# Patient Record
Sex: FEMALE | Race: WHITE | NOT HISPANIC OR LATINO | Employment: FULL TIME | URBAN - NONMETROPOLITAN AREA
[De-identification: names, ages, dates, MRNs, and addresses within clinical notes are randomized per-mention and may not be internally consistent; named-entity substitution may affect disease eponyms.]

---

## 2017-02-24 ENCOUNTER — TRANSCRIBE ORDERS (OUTPATIENT)
Dept: MAMMOGRAPHY | Facility: HOSPITAL | Age: 62
End: 2017-02-24

## 2017-02-24 DIAGNOSIS — Z13.9 SCREENING: Primary | ICD-10-CM

## 2017-03-13 ENCOUNTER — HOSPITAL ENCOUNTER (OUTPATIENT)
Dept: MAMMOGRAPHY | Facility: HOSPITAL | Age: 62
Discharge: HOME | End: 2017-03-13
Admitting: NURSE PRACTITIONER

## 2017-03-13 DIAGNOSIS — Z13.9 SCREENING: ICD-10-CM

## 2017-03-13 PROCEDURE — 77063 BREAST TOMOSYNTHESIS BI: CPT

## 2017-03-13 PROCEDURE — G0202 SCR MAMMO BI INCL CAD: HCPCS

## 2017-12-08 ENCOUNTER — APPOINTMENT (OUTPATIENT)
Dept: PREADMISSION TESTING | Facility: HOSPITAL | Age: 62
End: 2017-12-08

## 2017-12-08 VITALS — HEIGHT: 64 IN | WEIGHT: 172 LBS | BODY MASS INDEX: 29.37 KG/M2

## 2017-12-08 RX ORDER — ASPIRIN 81 MG/1
81 TABLET ORAL DAILY
COMMUNITY

## 2017-12-08 RX ORDER — LISINOPRIL 40 MG/1
40 TABLET ORAL DAILY
Status: ON HOLD | COMMUNITY
End: 2021-10-10 | Stop reason: SDUPTHER

## 2017-12-08 RX ORDER — VERAPAMIL HYDROCHLORIDE 240 MG/1
240 TABLET, FILM COATED, EXTENDED RELEASE ORAL DAILY
Status: ON HOLD | COMMUNITY
End: 2021-10-07

## 2017-12-08 RX ORDER — METOPROLOL TARTRATE 100 MG/1
100 TABLET ORAL DAILY
Status: ON HOLD | COMMUNITY
End: 2021-10-07

## 2017-12-08 RX ORDER — RANITIDINE 150 MG/1
150 TABLET ORAL DAILY
Status: ON HOLD | COMMUNITY
End: 2021-10-07

## 2017-12-08 RX ORDER — LEVOTHYROXINE SODIUM 0.05 MG/1
50 TABLET ORAL DAILY
COMMUNITY
End: 2022-02-11 | Stop reason: SDUPTHER

## 2017-12-19 ENCOUNTER — ANESTHESIA EVENT (OUTPATIENT)
Dept: PERIOP | Facility: HOSPITAL | Age: 62
End: 2017-12-19

## 2017-12-19 ENCOUNTER — ANESTHESIA (OUTPATIENT)
Dept: PERIOP | Facility: HOSPITAL | Age: 62
End: 2017-12-19

## 2017-12-19 ENCOUNTER — HOSPITAL ENCOUNTER (OUTPATIENT)
Facility: HOSPITAL | Age: 62
Setting detail: HOSPITAL OUTPATIENT SURGERY
Discharge: HOME | End: 2017-12-19
Attending: OPHTHALMOLOGY | Admitting: OPHTHALMOLOGY

## 2017-12-19 VITALS
SYSTOLIC BLOOD PRESSURE: 105 MMHG | DIASTOLIC BLOOD PRESSURE: 62 MMHG | OXYGEN SATURATION: 98 % | TEMPERATURE: 97 F | HEART RATE: 53 BPM | RESPIRATION RATE: 18 BRPM

## 2017-12-19 PROBLEM — H26.9 CATARACT OF RIGHT EYE: Status: RESOLVED | Noted: 2017-12-19 | Resolved: 2017-12-19

## 2017-12-19 PROBLEM — H26.9 CATARACT OF RIGHT EYE: Status: ACTIVE | Noted: 2017-12-19

## 2017-12-19 LAB — GLUCOSE BLDC GLUCOMTR-MCNC: 130 MG/DL (ref 70–130)

## 2017-12-19 PROCEDURE — 82962 GLUCOSE BLOOD TEST: CPT

## 2017-12-19 PROCEDURE — 25010000002 EPINEPHRINE PER 0.1 MG: Performed by: OPHTHALMOLOGY

## 2017-12-19 PROCEDURE — V2632 POST CHMBR INTRAOCULAR LENS: HCPCS | Performed by: OPHTHALMOLOGY

## 2017-12-19 PROCEDURE — 25010000002 MIDAZOLAM PER 1 MG: Performed by: NURSE ANESTHETIST, CERTIFIED REGISTERED

## 2017-12-19 DEVICE — LENS ACRYSOF IQ 6X13MM SN60WF 21.5: Type: IMPLANTABLE DEVICE | Site: POSTERIOR CHAMBER | Status: FUNCTIONAL

## 2017-12-19 RX ORDER — CYCLOPENTOLATE HYDROCHLORIDE 20 MG/ML
1 SOLUTION/ DROPS OPHTHALMIC
Status: COMPLETED | OUTPATIENT
Start: 2017-12-19 | End: 2017-12-19

## 2017-12-19 RX ORDER — SODIUM CHLORIDE, SODIUM LACTATE, POTASSIUM CHLORIDE, CALCIUM CHLORIDE 600; 310; 30; 20 MG/100ML; MG/100ML; MG/100ML; MG/100ML
1000 INJECTION, SOLUTION INTRAVENOUS CONTINUOUS PRN
Status: DISCONTINUED | OUTPATIENT
Start: 2017-12-19 | End: 2017-12-19 | Stop reason: HOSPADM

## 2017-12-19 RX ORDER — ENALAPRILAT 2.5 MG/2ML
1.25 INJECTION INTRAVENOUS ONCE AS NEEDED
Status: DISCONTINUED | OUTPATIENT
Start: 2017-12-19 | End: 2017-12-19 | Stop reason: HOSPADM

## 2017-12-19 RX ORDER — PREDNISOLONE ACETATE 10 MG/ML
SUSPENSION/ DROPS OPHTHALMIC AS NEEDED
Status: DISCONTINUED | OUTPATIENT
Start: 2017-12-19 | End: 2017-12-19 | Stop reason: HOSPADM

## 2017-12-19 RX ORDER — MIDAZOLAM HYDROCHLORIDE 1 MG/ML
INJECTION INTRAMUSCULAR; INTRAVENOUS AS NEEDED
Status: DISCONTINUED | OUTPATIENT
Start: 2017-12-19 | End: 2017-12-19 | Stop reason: SURG

## 2017-12-19 RX ORDER — PREDNISOLONE ACETATE 10 MG/ML
SUSPENSION/ DROPS OPHTHALMIC
Status: DISCONTINUED
Start: 2017-12-19 | End: 2017-12-19 | Stop reason: HOSPADM

## 2017-12-19 RX ORDER — CYCLOPENTOLATE HYDROCHLORIDE 20 MG/ML
SOLUTION/ DROPS OPHTHALMIC
Status: COMPLETED
Start: 2017-12-19 | End: 2017-12-19

## 2017-12-19 RX ORDER — MOXIFLOXACIN 5 MG/ML
1 SOLUTION/ DROPS OPHTHALMIC 3 TIMES DAILY
Status: ON HOLD | COMMUNITY
End: 2021-10-07

## 2017-12-19 RX ORDER — PHENYLEPHRINE HYDROCHLORIDE 100 MG/ML
1 SOLUTION/ DROPS OPHTHALMIC
Status: COMPLETED | OUTPATIENT
Start: 2017-12-19 | End: 2017-12-19

## 2017-12-19 RX ORDER — LIDOCAINE HYDROCHLORIDE 40 MG/ML
INJECTION, SOLUTION RETROBULBAR; TOPICAL AS NEEDED
Status: DISCONTINUED | OUTPATIENT
Start: 2017-12-19 | End: 2017-12-19 | Stop reason: HOSPADM

## 2017-12-19 RX ORDER — PHENYLEPHRINE HYDROCHLORIDE 100 MG/ML
SOLUTION/ DROPS OPHTHALMIC
Status: COMPLETED
Start: 2017-12-19 | End: 2017-12-19

## 2017-12-19 RX ORDER — TETRACAINE HYDROCHLORIDE 5 MG/ML
SOLUTION OPHTHALMIC
Status: COMPLETED
Start: 2017-12-19 | End: 2017-12-19

## 2017-12-19 RX ORDER — SODIUM CHLORIDE 0.9 % (FLUSH) 0.9 %
1-10 SYRINGE (ML) INJECTION AS NEEDED
Status: DISCONTINUED | OUTPATIENT
Start: 2017-12-19 | End: 2017-12-19 | Stop reason: HOSPADM

## 2017-12-19 RX ORDER — BALANCED SALT SOLUTION 6.4; .75; .48; .3; 3.9; 1.7 MG/ML; MG/ML; MG/ML; MG/ML; MG/ML; MG/ML
SOLUTION OPHTHALMIC AS NEEDED
Status: DISCONTINUED | OUTPATIENT
Start: 2017-12-19 | End: 2017-12-19 | Stop reason: HOSPADM

## 2017-12-19 RX ORDER — PREDNISOLONE ACETATE 10 MG/ML
1 SUSPENSION/ DROPS OPHTHALMIC
Status: DISCONTINUED | OUTPATIENT
Start: 2017-12-19 | End: 2017-12-19 | Stop reason: HOSPADM

## 2017-12-19 RX ORDER — PILOCARPINE HYDROCHLORIDE 10 MG/ML
SOLUTION/ DROPS OPHTHALMIC AS NEEDED
Status: DISCONTINUED | OUTPATIENT
Start: 2017-12-19 | End: 2017-12-19 | Stop reason: HOSPADM

## 2017-12-19 RX ORDER — TETRACAINE HYDROCHLORIDE 5 MG/ML
1 SOLUTION OPHTHALMIC SEE ADMIN INSTRUCTIONS
Status: COMPLETED | OUTPATIENT
Start: 2017-12-19 | End: 2017-12-19

## 2017-12-19 RX ADMIN — MIDAZOLAM HYDROCHLORIDE 1 MG: 1 INJECTION, SOLUTION INTRAMUSCULAR; INTRAVENOUS at 09:30

## 2017-12-19 RX ADMIN — MIDAZOLAM HYDROCHLORIDE 1 MG: 1 INJECTION, SOLUTION INTRAMUSCULAR; INTRAVENOUS at 09:40

## 2017-12-19 RX ADMIN — CYCLOPENTOLATE HYDROCHLORIDE 1 DROP: 20 SOLUTION/ DROPS OPHTHALMIC at 08:40

## 2017-12-19 RX ADMIN — SODIUM CHLORIDE, POTASSIUM CHLORIDE, SODIUM LACTATE AND CALCIUM CHLORIDE 1000 ML: 600; 310; 30; 20 INJECTION, SOLUTION INTRAVENOUS at 08:44

## 2017-12-19 RX ADMIN — CYCLOPENTOLATE HYDROCHLORIDE 1 DROP: 20 SOLUTION/ DROPS OPHTHALMIC at 08:35

## 2017-12-19 RX ADMIN — PHENYLEPHRINE HYDROCHLORIDE 1 DROP: 100 SOLUTION/ DROPS OPHTHALMIC at 08:40

## 2017-12-19 RX ADMIN — TETRACAINE HYDROCHLORIDE 1 DROP: 5 SOLUTION OPHTHALMIC at 08:34

## 2017-12-19 RX ADMIN — PHENYLEPHRINE HYDROCHLORIDE 1 DROP: 100 SOLUTION/ DROPS OPHTHALMIC at 08:41

## 2017-12-19 RX ADMIN — CYCLOPENTOLATE HYDROCHLORIDE 1 DROP: 20 SOLUTION/ DROPS OPHTHALMIC at 08:45

## 2017-12-19 RX ADMIN — PHENYLEPHRINE HYDROCHLORIDE 1 DROP: 100 SOLUTION/ DROPS OPHTHALMIC at 08:35

## 2017-12-19 RX ADMIN — TETRACAINE HYDROCHLORIDE 1 DROP: 5 SOLUTION OPHTHALMIC at 08:33

## 2017-12-19 NOTE — ANESTHESIA POSTPROCEDURE EVALUATION
Patient: Lizeth Bhardwaj    Procedure Summary     Date Anesthesia Start Anesthesia Stop Room / Location    12/19/17 0929 0956  [facility]       Procedure Diagnosis Surgeon Provider    CATARACT PHACO EXTRACTION WITH INTRAOCULAR LENS IMPLANT RIGHT (Right Eye) No diagnosis on file. MD Darryl Billy CRNA          Anesthesia Type: MAC  Last vitals  BP   108/67 (12/19/17 1002)   Temp   97 °F (36.1 °C) (12/19/17 1002)   Pulse   57 (12/19/17 1002)   Resp   18 (12/19/17 1002)     SpO2   97 % (12/19/17 1002)     Post Anesthesia Care and Evaluation    Patient location during evaluation: bedside  Patient participation: complete - patient participated  Level of consciousness: awake and alert  Pain management: satisfactory to patient  Airway patency: patent  Anesthetic complications: No anesthetic complications  PONV Status: controlled  Cardiovascular status: acceptable and stable  Respiratory status: acceptable  Hydration status: acceptable

## 2017-12-19 NOTE — PLAN OF CARE
Problem: Cataract (Adult)  Goal: Signs and Symptoms of Listed Potential Problems Will be Absent or Manageable (Cataract)  Outcome: Ongoing (interventions implemented as appropriate)    12/19/17 4133   Cataract   Problems Assessed (Cataract) all   Problems Present (Cataract) none

## 2017-12-19 NOTE — OP NOTE
OPERATIVE NOTE    DATE OF PROCEDURE: 12/19/2017  PATIENT NAME: Lizeth Bhardwaj  PATIENT MRN: [id number]  YOB: 1955     PREOPERATIVE DIAGNOSIS: Right nuclear sclerotic cataract.     POSTOPERATIVE DIAGNOSIS: Right nuclear sclerotic cataract.     PROCEDURE PERFORMED: Phacoemulsification with implantation of a 21.5 diopter foldable posterior chamber intraocular lens, Right eye.     SURGEON: Kriss Way MD      INDICATIONS: The patient is an 62 y.o. female with a Right nuclear sclerotic cataract with a preoperative visual acuity of hand motion. The patient desires better vision and is brought to the operating room at this time for elective cataract extraction with planned IOL.      DESCRIPTION OF PROCEDURE: The patient was brought to the operating room in the fasting state. Once all the vital signs were established to be within normal limits and supplemental oxygen was given, the area of the operative eye was prepped and draped in the usual fashion. A wire lid speculum was inserted into the cul-de-sac. Additional topical anesthetic drops were instilled. A fornix-based conjunctival flap was performed from the 11-o'clock to 1-o'clock position. A stab incision was made in the peripheral cornea with the 0.8mm stab blade. Next 0.3 mL of 1% unpreserved Xylocaine was injected in the anterior chamber. The anterior chamber was then entered with a 2.4 mm keratome blade and then under viscoelastic a capsulotomy was performed using the disposable cystotome in a continuous curvilinear fashion. The anterior capsule was then removed and the phacoemulsification handpiece was then introduced into the anterior segment and the nucleus was emulsified without difficulty. The CDE was 25.00. Once this was accomplished, the irrigation and aspiration handpiece was then used to aspirate the residual cortex. The posterior capsule was cleaned of any residual material and then polished with the irrigation and aspiration  handpiece and the viscoelastic was used to inflate the capsular bag. The Te AcrySof implant was then loaded into the microcartridge. It measured 21.5 diopters. The injector was then introduced into the capsular bag and the implant was slowly implanted without difficulty. The lens was then rotated to the appropriate axis and the viscoelastic was aspirated. The conjunctiva was then repositioned. Topical Betoptic-S, pilocarpine, and Pred-Forte drops were applied. Polysporin ointment was then instilled. There were no anesthetic or surgical difficulties. The patient tolerated the procedure very well and was returned to same day surgery in satisfactory condition.       Implant Name Type Inv. Item Serial No.  Lot No. LRB No. Used   LENS ACRYSOF IQ [id number] 21.5 - [id number] - [id number] Implant LENS ACRYSOF IQ [id number] 21.5 [id number]   Right 1           Kriss Way MD  12/19/2017

## 2017-12-19 NOTE — ANESTHESIA PREPROCEDURE EVALUATION
Anesthesia Evaluation     Patient summary reviewed and Nursing notes reviewed   NPO Solid Status: > 8 hours  NPO Liquid Status: > 8 hours     Airway   Mallampati: II  TM distance: >3 FB  Neck ROM: limited  possible difficult intubation  Dental    (+) poor dentation    Pulmonary - normal exam    breath sounds clear to auscultation  Cardiovascular - normal exam    Patient on routine beta blocker  Rhythm: regular  Rate: normal    (+) hypertension,       Neuro/Psych  GI/Hepatic/Renal/Endo    (+)  hiatal hernia, GERD, diabetes mellitus,     Musculoskeletal     Abdominal    Substance History      OB/GYN          Other                                        Anesthesia Plan    ASA 3     MAC     intravenous induction   Anesthetic plan and risks discussed with patient.

## 2018-02-05 ENCOUNTER — TRANSCRIBE ORDERS (OUTPATIENT)
Dept: ADMINISTRATIVE | Facility: HOSPITAL | Age: 63
End: 2018-02-05

## 2018-02-05 DIAGNOSIS — Z12.39 ENCOUNTER FOR SCREENING FOR MALIGNANT NEOPLASM OF BREAST: Primary | ICD-10-CM

## 2018-03-19 ENCOUNTER — HOSPITAL ENCOUNTER (OUTPATIENT)
Dept: MAMMOGRAPHY | Facility: HOSPITAL | Age: 63
Discharge: HOME | End: 2018-03-19
Admitting: NURSE PRACTITIONER

## 2018-03-19 ENCOUNTER — APPOINTMENT (OUTPATIENT)
Dept: MAMMOGRAPHY | Facility: HOSPITAL | Age: 63
End: 2018-03-19

## 2018-03-19 DIAGNOSIS — Z12.39 ENCOUNTER FOR SCREENING FOR MALIGNANT NEOPLASM OF BREAST: ICD-10-CM

## 2018-03-19 PROCEDURE — 77063 BREAST TOMOSYNTHESIS BI: CPT

## 2018-03-19 PROCEDURE — 77067 SCR MAMMO BI INCL CAD: CPT

## 2019-02-18 ENCOUNTER — TRANSCRIBE ORDERS (OUTPATIENT)
Dept: MAMMOGRAPHY | Facility: HOSPITAL | Age: 64
End: 2019-02-18

## 2019-02-18 ENCOUNTER — TRANSCRIBE ORDERS (OUTPATIENT)
Dept: ADMINISTRATIVE | Facility: HOSPITAL | Age: 64
End: 2019-02-18

## 2019-02-18 DIAGNOSIS — N18.30 CHRONIC KIDNEY DISEASE, STAGE III (MODERATE) (HCC): Primary | ICD-10-CM

## 2019-02-18 DIAGNOSIS — Z12.39 BREAST CANCER SCREENING: Primary | ICD-10-CM

## 2019-02-25 ENCOUNTER — HOSPITAL ENCOUNTER (OUTPATIENT)
Dept: ULTRASOUND IMAGING | Facility: HOSPITAL | Age: 64
Discharge: HOME | End: 2019-02-25
Admitting: INTERNAL MEDICINE

## 2019-02-25 DIAGNOSIS — N18.30 CHRONIC KIDNEY DISEASE, STAGE III (MODERATE) (HCC): ICD-10-CM

## 2019-02-25 PROCEDURE — 76775 US EXAM ABDO BACK WALL LIM: CPT

## 2019-04-01 ENCOUNTER — HOSPITAL ENCOUNTER (OUTPATIENT)
Dept: MAMMOGRAPHY | Facility: HOSPITAL | Age: 64
Discharge: HOME | End: 2019-04-01
Admitting: NURSE PRACTITIONER

## 2019-04-01 DIAGNOSIS — Z12.39 BREAST CANCER SCREENING: ICD-10-CM

## 2019-04-01 PROCEDURE — 77067 SCR MAMMO BI INCL CAD: CPT

## 2019-04-01 PROCEDURE — 77063 BREAST TOMOSYNTHESIS BI: CPT

## 2020-03-04 ENCOUNTER — TRANSCRIBE ORDERS (OUTPATIENT)
Dept: MAMMOGRAPHY | Facility: HOSPITAL | Age: 65
End: 2020-03-04

## 2020-03-04 DIAGNOSIS — Z12.31 ENCOUNTER FOR SCREENING MAMMOGRAM FOR MALIGNANT NEOPLASM OF BREAST: Primary | ICD-10-CM

## 2020-03-06 ENCOUNTER — TRANSCRIBE ORDERS (OUTPATIENT)
Dept: ADMINISTRATIVE | Facility: HOSPITAL | Age: 65
End: 2020-03-06

## 2020-03-06 DIAGNOSIS — I77.9 DISEASE OF ARTERY (HCC): Primary | ICD-10-CM

## 2020-03-11 ENCOUNTER — HOSPITAL ENCOUNTER (OUTPATIENT)
Dept: ULTRASOUND IMAGING | Facility: HOSPITAL | Age: 65
Discharge: HOME | End: 2020-03-11
Admitting: INTERNAL MEDICINE

## 2020-03-11 DIAGNOSIS — I77.9 DISEASE OF ARTERY (HCC): ICD-10-CM

## 2020-03-11 PROCEDURE — 93880 EXTRACRANIAL BILAT STUDY: CPT

## 2021-05-12 ENCOUNTER — TRANSCRIBE ORDERS (OUTPATIENT)
Dept: ADMINISTRATIVE | Facility: HOSPITAL | Age: 66
End: 2021-05-12

## 2021-05-12 DIAGNOSIS — Z12.31 VISIT FOR SCREENING MAMMOGRAM: Primary | ICD-10-CM

## 2021-06-24 ENCOUNTER — APPOINTMENT (OUTPATIENT)
Dept: MAMMOGRAPHY | Facility: HOSPITAL | Age: 66
End: 2021-06-24

## 2021-10-06 ENCOUNTER — APPOINTMENT (OUTPATIENT)
Dept: GENERAL RADIOLOGY | Facility: HOSPITAL | Age: 66
End: 2021-10-06

## 2021-10-06 ENCOUNTER — HOSPITAL ENCOUNTER (INPATIENT)
Facility: HOSPITAL | Age: 66
LOS: 4 days | Discharge: HOME | End: 2021-10-10
Attending: EMERGENCY MEDICINE | Admitting: INTERNAL MEDICINE

## 2021-10-06 DIAGNOSIS — N17.9 ACUTE RENAL FAILURE, UNSPECIFIED ACUTE RENAL FAILURE TYPE (HCC): Primary | ICD-10-CM

## 2021-10-06 DIAGNOSIS — E87.5 HYPERKALEMIA: ICD-10-CM

## 2021-10-06 LAB
ALBUMIN SERPL-MCNC: 4.5 G/DL (ref 3.5–5.2)
ALBUMIN/GLOB SERPL: 1.4 G/DL
ALP SERPL-CCNC: 67 U/L (ref 39–117)
ALT SERPL W P-5'-P-CCNC: 15 U/L (ref 1–33)
ANION GAP SERPL CALCULATED.3IONS-SCNC: 10.3 MMOL/L (ref 5–15)
ANION GAP SERPL CALCULATED.3IONS-SCNC: 10.6 MMOL/L (ref 5–15)
AST SERPL-CCNC: 18 U/L (ref 1–32)
BACTERIA UR QL AUTO: ABNORMAL /HPF
BASOPHILS # BLD AUTO: 0.04 10*3/MM3 (ref 0–0.2)
BASOPHILS NFR BLD AUTO: 0.3 % (ref 0–1.5)
BILIRUB SERPL-MCNC: 0.2 MG/DL (ref 0–1.2)
BILIRUB UR QL STRIP: NEGATIVE
BUN SERPL-MCNC: 94 MG/DL (ref 8–23)
BUN SERPL-MCNC: 98 MG/DL (ref 8–23)
BUN/CREAT SERPL: 17.4 (ref 7–25)
BUN/CREAT SERPL: 18.3 (ref 7–25)
CALCIUM SPEC-SCNC: 9.8 MG/DL (ref 8.6–10.5)
CALCIUM SPEC-SCNC: 9.8 MG/DL (ref 8.6–10.5)
CHLORIDE SERPL-SCNC: 109 MMOL/L (ref 98–107)
CHLORIDE SERPL-SCNC: 114 MMOL/L (ref 98–107)
CLARITY UR: CLEAR
CO2 SERPL-SCNC: 10.7 MMOL/L (ref 22–29)
CO2 SERPL-SCNC: 11.4 MMOL/L (ref 22–29)
COLOR UR: YELLOW
CREAT SERPL-MCNC: 5.13 MG/DL (ref 0.57–1)
CREAT SERPL-MCNC: 5.62 MG/DL (ref 0.57–1)
DEPRECATED RDW RBC AUTO: 45.3 FL (ref 37–54)
EOSINOPHIL # BLD AUTO: 0.11 10*3/MM3 (ref 0–0.4)
EOSINOPHIL NFR BLD AUTO: 0.8 % (ref 0.3–6.2)
ERYTHROCYTE [DISTWIDTH] IN BLOOD BY AUTOMATED COUNT: 13.3 % (ref 12.3–15.4)
GFR SERPL CREATININE-BSD FRML MDRD: 8 ML/MIN/1.73
GFR SERPL CREATININE-BSD FRML MDRD: 8 ML/MIN/1.73
GFR SERPL CREATININE-BSD FRML MDRD: ABNORMAL ML/MIN/{1.73_M2}
GFR SERPL CREATININE-BSD FRML MDRD: ABNORMAL ML/MIN/{1.73_M2}
GLOBULIN UR ELPH-MCNC: 3.3 GM/DL
GLUCOSE SERPL-MCNC: 106 MG/DL (ref 65–99)
GLUCOSE SERPL-MCNC: 188 MG/DL (ref 65–99)
GLUCOSE UR STRIP-MCNC: NEGATIVE MG/DL
HCT VFR BLD AUTO: 40.9 % (ref 34–46.6)
HGB BLD-MCNC: 13.7 G/DL (ref 12–15.9)
HGB UR QL STRIP.AUTO: NEGATIVE
HYALINE CASTS UR QL AUTO: ABNORMAL /LPF
IMM GRANULOCYTES # BLD AUTO: 0.05 10*3/MM3 (ref 0–0.05)
IMM GRANULOCYTES NFR BLD AUTO: 0.4 % (ref 0–0.5)
KETONES UR QL STRIP: NEGATIVE
LEUKOCYTE ESTERASE UR QL STRIP.AUTO: ABNORMAL
LYMPHOCYTES # BLD AUTO: 1.69 10*3/MM3 (ref 0.7–3.1)
LYMPHOCYTES NFR BLD AUTO: 12.3 % (ref 19.6–45.3)
MAGNESIUM SERPL-MCNC: 2.3 MG/DL (ref 1.6–2.4)
MCH RBC QN AUTO: 31.1 PG (ref 26.6–33)
MCHC RBC AUTO-ENTMCNC: 33.5 G/DL (ref 31.5–35.7)
MCV RBC AUTO: 92.7 FL (ref 79–97)
MONOCYTES # BLD AUTO: 0.59 10*3/MM3 (ref 0.1–0.9)
MONOCYTES NFR BLD AUTO: 4.3 % (ref 5–12)
NEUTROPHILS NFR BLD AUTO: 11.28 10*3/MM3 (ref 1.7–7)
NEUTROPHILS NFR BLD AUTO: 81.9 % (ref 42.7–76)
NITRITE UR QL STRIP: NEGATIVE
NRBC BLD AUTO-RTO: 0 /100 WBC (ref 0–0.2)
PH UR STRIP.AUTO: <=5 [PH] (ref 5–8)
PLATELET # BLD AUTO: 247 10*3/MM3 (ref 140–450)
PMV BLD AUTO: 11.3 FL (ref 6–12)
POTASSIUM SERPL-SCNC: 6.6 MMOL/L (ref 3.5–5.2)
POTASSIUM SERPL-SCNC: 8.6 MMOL/L (ref 3.5–5.2)
PROT SERPL-MCNC: 7.8 G/DL (ref 6–8.5)
PROT UR QL STRIP: NEGATIVE
RBC # BLD AUTO: 4.41 10*6/MM3 (ref 3.77–5.28)
RBC # UR: ABNORMAL /HPF
REF LAB TEST METHOD: ABNORMAL
SARS-COV-2 RNA PNL SPEC NAA+PROBE: NOT DETECTED
SODIUM SERPL-SCNC: 130 MMOL/L (ref 136–145)
SODIUM SERPL-SCNC: 136 MMOL/L (ref 136–145)
SP GR UR STRIP: 1.01 (ref 1–1.03)
SQUAMOUS #/AREA URNS HPF: ABNORMAL /HPF
TROPONIN T SERPL-MCNC: <0.01 NG/ML (ref 0–0.03)
UROBILINOGEN UR QL STRIP: ABNORMAL
WBC # BLD AUTO: 13.76 10*3/MM3 (ref 3.4–10.8)
WBC UR QL AUTO: ABNORMAL /HPF

## 2021-10-06 PROCEDURE — 84484 ASSAY OF TROPONIN QUANT: CPT | Performed by: EMERGENCY MEDICINE

## 2021-10-06 PROCEDURE — 71045 X-RAY EXAM CHEST 1 VIEW: CPT

## 2021-10-06 PROCEDURE — 83036 HEMOGLOBIN GLYCOSYLATED A1C: CPT | Performed by: INTERNAL MEDICINE

## 2021-10-06 PROCEDURE — 93005 ELECTROCARDIOGRAM TRACING: CPT | Performed by: EMERGENCY MEDICINE

## 2021-10-06 PROCEDURE — 25010000002 CEFTRIAXONE SODIUM-DEXTROSE 1-3.74 GM-%(50ML) RECONSTITUTED SOLUTION: Performed by: FAMILY MEDICINE

## 2021-10-06 PROCEDURE — 81001 URINALYSIS AUTO W/SCOPE: CPT | Performed by: EMERGENCY MEDICINE

## 2021-10-06 PROCEDURE — 83735 ASSAY OF MAGNESIUM: CPT | Performed by: EMERGENCY MEDICINE

## 2021-10-06 PROCEDURE — 25010000002 CALCIUM GLUCONATE PER 10 ML: Performed by: FAMILY MEDICINE

## 2021-10-06 PROCEDURE — 80053 COMPREHEN METABOLIC PANEL: CPT | Performed by: EMERGENCY MEDICINE

## 2021-10-06 PROCEDURE — 87635 SARS-COV-2 COVID-19 AMP PRB: CPT | Performed by: FAMILY MEDICINE

## 2021-10-06 PROCEDURE — 36415 COLL VENOUS BLD VENIPUNCTURE: CPT

## 2021-10-06 PROCEDURE — 94799 UNLISTED PULMONARY SVC/PX: CPT

## 2021-10-06 PROCEDURE — 94640 AIRWAY INHALATION TREATMENT: CPT

## 2021-10-06 PROCEDURE — 63710000001 INSULIN REGULAR HUMAN PER 5 UNITS: Performed by: FAMILY MEDICINE

## 2021-10-06 PROCEDURE — 85025 COMPLETE CBC W/AUTO DIFF WBC: CPT | Performed by: EMERGENCY MEDICINE

## 2021-10-06 PROCEDURE — 87086 URINE CULTURE/COLONY COUNT: CPT | Performed by: EMERGENCY MEDICINE

## 2021-10-06 PROCEDURE — 99285 EMERGENCY DEPT VISIT HI MDM: CPT

## 2021-10-06 RX ORDER — CALCIUM GLUCONATE 94 MG/ML
1 INJECTION, SOLUTION INTRAVENOUS ONCE
Status: COMPLETED | OUTPATIENT
Start: 2021-10-06 | End: 2021-10-06

## 2021-10-06 RX ORDER — HYDROCHLOROTHIAZIDE 25 MG/1
25 TABLET ORAL DAILY
COMMUNITY
End: 2022-02-11 | Stop reason: SDUPTHER

## 2021-10-06 RX ORDER — SPIRONOLACTONE 25 MG/1
25 TABLET ORAL DAILY
COMMUNITY
End: 2021-10-10 | Stop reason: HOSPADM

## 2021-10-06 RX ORDER — CEFTRIAXONE 1 G/50ML
1 INJECTION, SOLUTION INTRAVENOUS ONCE
Status: COMPLETED | OUTPATIENT
Start: 2021-10-06 | End: 2021-10-06

## 2021-10-06 RX ORDER — DEXTROSE MONOHYDRATE 25 G/50ML
50 INJECTION, SOLUTION INTRAVENOUS
Status: DISCONTINUED | OUTPATIENT
Start: 2021-10-06 | End: 2021-10-10 | Stop reason: HOSPADM

## 2021-10-06 RX ORDER — ALBUTEROL SULFATE 2.5 MG/3ML
2.5 SOLUTION RESPIRATORY (INHALATION) ONCE
Status: COMPLETED | OUTPATIENT
Start: 2021-10-06 | End: 2021-10-07

## 2021-10-06 RX ORDER — ALBUTEROL SULFATE 2.5 MG/3ML
2.5 SOLUTION RESPIRATORY (INHALATION) ONCE
Status: COMPLETED | OUTPATIENT
Start: 2021-10-06 | End: 2021-10-06

## 2021-10-06 RX ORDER — METOPROLOL SUCCINATE 100 MG/1
100 TABLET, EXTENDED RELEASE ORAL DAILY
COMMUNITY
End: 2022-02-11 | Stop reason: ALTCHOICE

## 2021-10-06 RX ORDER — SODIUM POLYSTYRENE SULFONATE 15 G/60ML
30 SUSPENSION ORAL; RECTAL ONCE
Status: COMPLETED | OUTPATIENT
Start: 2021-10-06 | End: 2021-10-06

## 2021-10-06 RX ORDER — SODIUM CHLORIDE 0.9 % (FLUSH) 0.9 %
10 SYRINGE (ML) INJECTION AS NEEDED
Status: DISCONTINUED | OUTPATIENT
Start: 2021-10-06 | End: 2021-10-10 | Stop reason: HOSPADM

## 2021-10-06 RX ORDER — AMLODIPINE BESYLATE 10 MG/1
10 TABLET ORAL DAILY
COMMUNITY
End: 2022-02-11 | Stop reason: SDUPTHER

## 2021-10-06 RX ORDER — OMEPRAZOLE 20 MG/1
20 CAPSULE, DELAYED RELEASE ORAL DAILY
COMMUNITY
End: 2022-02-11 | Stop reason: SDUPTHER

## 2021-10-06 RX ADMIN — SODIUM CHLORIDE 1000 ML: 9 INJECTION, SOLUTION INTRAVENOUS at 20:50

## 2021-10-06 RX ADMIN — HUMAN INSULIN 10 UNITS: 100 INJECTION, SOLUTION SUBCUTANEOUS at 20:46

## 2021-10-06 RX ADMIN — DEXTROSE MONOHYDRATE 50 ML: 25 INJECTION, SOLUTION INTRAVENOUS at 22:21

## 2021-10-06 RX ADMIN — CALCIUM GLUCONATE 1 G: 98 INJECTION, SOLUTION INTRAVENOUS at 20:47

## 2021-10-06 RX ADMIN — SODIUM CHLORIDE 1000 ML: 9 INJECTION, SOLUTION INTRAVENOUS at 19:03

## 2021-10-06 RX ADMIN — ALBUTEROL SULFATE 2.5 MG: 2.5 SOLUTION RESPIRATORY (INHALATION) at 22:17

## 2021-10-06 RX ADMIN — CEFTRIAXONE 1 G: 1 INJECTION, SOLUTION INTRAVENOUS at 19:06

## 2021-10-06 RX ADMIN — SODIUM POLYSTYRENE SULFONATE 30 G: 15 SUSPENSION ORAL; RECTAL at 20:52

## 2021-10-06 RX ADMIN — HUMAN INSULIN 10 UNITS: 100 INJECTION, SOLUTION SUBCUTANEOUS at 22:21

## 2021-10-06 RX ADMIN — CALCIUM GLUCONATE 1 G: 98 INJECTION, SOLUTION INTRAVENOUS at 22:21

## 2021-10-06 RX ADMIN — DEXTROSE MONOHYDRATE 50 ML: 25 INJECTION, SOLUTION INTRAVENOUS at 20:46

## 2021-10-06 NOTE — ED PROVIDER NOTES
Subjective   History of Present Illness    Chief Complaint: General weakness, lower urinary tract symptoms  History of Present Illness: 66-year-old female presents with above complaints states she completed 1 week of Bactrim has persistent urinary symptoms, feels generally weak and nauseated.  No fever.  Had reported negative Covid testing at primary care physician office earlier today, sent to ER for further evaluation.  Onset: Gradually worsening over the last few days  Duration: Persistent  Exacerbating / Alleviating factors: None  Associated symptoms: None      Nurses Notes reviewed and agree, including vitals, allergies, social history and prior medical history.     REVIEW OF SYSTEMS: All systems reviewed and not pertinent unless noted.    Positive for: General weakness lower urinary tract symptoms, nausea    Negative for: Fever flank pain chills diarrhea shortness of breath cough  Review of Systems    Past Medical History:   Diagnosis Date   • Cataract    • Diabetes (HCC)     type 1   • Disease of thyroid gland    • GERD (gastroesophageal reflux disease)    • Hiatal hernia    • Hypertension        No Known Allergies    Past Surgical History:   Procedure Laterality Date   • CATARACT EXTRACTION W/ INTRAOCULAR LENS IMPLANT Right 12/19/2017    Procedure: CATARACT PHACO EXTRACTION WITH INTRAOCULAR LENS IMPLANT RIGHT;  Surgeon: Kriss Way MD;  Location: [facility];  Service:    • WISDOM TOOTH EXTRACTION         History reviewed. No pertinent family history.    Social History     Socioeconomic History   • Marital status:      Spouse name: Not on file   • Number of children: Not on file   • Years of education: Not on file   • Highest education level: Not on file   Tobacco Use   • Smoking status: Never Smoker   • Smokeless tobacco: Never Used   Substance and Sexual Activity   • Alcohol use: No   • Drug use: No   • Sexual activity: Defer           Objective   Physical Exam    CONSTITUTIONAL: Well  developed, nontoxic obese 66-year-old  female,  in no acute distress.  VITAL SIGNS: per nursing, reviewed and noted  SKIN: exposed skin with no rashes, ulcerations or petechiae.  EYES: perrla. EOMI.  ENT: Normal voice.  Patient maintained wearing a mask throughout patient encounter due to coronavirus pandemic  RESPIRATORY:  No increased work of breathing. No retractions.   CARDIOVASCULAR:  regular rate and rhythm, no murmurs.  Good Peripheral pulses. Good cap refill to extremities.   GI: Abdomen soft, nontender, normal bowel sounds. No hernia. No ascites.  MUSCULOSKELETAL:  No tenderness. Full ROM. Strength and tone grossly normal.  no spasms. no neck or back tenderness or spasm.   NEUROLOGIC: Alert, oriented x 3. No gross deficits. GCS 15.   PSYCH: appropriate affect.  : no bladder tenderness or distention, no CVA tenderness      Procedures     No attending physician procedures were performed on this patient.      ED Course  ED Course as of Oct 07 2140   Wed Oct 06, 2021   1830 WBC(!): 13.76 [PF]   1830 Hemoglobin: 13.7 [PF]   1830 Hematocrit: 40.9 [PF]   1830 Platelets: 247 [PF]   1831 EKG interpreted by me reveals sinus bradycardia rate 58. No ectopy no ischemic changes    [PF]   1945 Creatinine(!): 5.62 [MH]   Thu Oct 07, 2021   0000 Patient was admitted however after being accepted to the hospitalist services to critical care-they had spoken with nephrology who felt the patient needed a temporary HD catheter immediately.  After discussion with the director there is not something that we normally do in the emergency department patient is hemodynamically stable so does not feel to be as emergent.  At this point we will try medical treatment and if her potassium and creatinine do not improve after the initial medical treatment at that point we will put in a temporary catheter.  I discussed with the hospitalist who is in agreement that if she remains hemodynamically stable and her labs trend toward the  norm she would feel better keeping her here.    [MH]   0356 Patient has remained hemodynamically stable throughout the ED stay.  Patient's potassium has went from 8.6-6.2 her creatinine is down to 4.8 from 5.62. Patient is continuing to get Kayexalate and insulin and D50.  Patient is also continuing to get fluids at the recommendation of the hospitalist we did add bicarb to them.  We will give another treatment of insulin D50 and Kayexalate now and at 530 get a repeat BMP if patient is remaining stable and labs are still trending toward normal we will discuss with the hospitalist again and recommend admission to the ICU at that time.    [MH]   0401 EKG interpretation time is 34 sinus rhythm 72 bpm QRS duration is 74 QT is 350 QTC is 374 nonspecific changes noted anteroseptally however there is no acute ST elevation or any EKG changes to indicate hyperkalemic changes.    [MH]      ED Course User Index  [MH] Jena Horton DO  [PF] Sebastian Gonzáles, DO                                           MDM  Number of Diagnoses or Management Options  Acute renal failure, unspecified acute renal failure type (HCC): new and requires workup  Hyperkalemia: new and requires workup  Diagnosis management comments: 66-year-old female with past medical history of hypertension, hyperlipidemia, diabetes and hypothyroidism presents emergency department complaining of nausea vomiting weakness and recent UTI that was treated with Bactrim.  Patient reports she stopped the Bactrim on Tuesday.  Patient reports that she had went to the doctor about 2 weeks ago because she was on metformin and was having weakness and diarrhea and at that time they stopped the Metformin and then initiated the Bactrim.  Patient reports that she is just been weak that she could barely get out of bed and having this nausea and vomiting that she decided to come in today.  Patient does have a white count of 13.  However the most concerning part is her  creatinine is 5.86 and her potassium is 8.6 without any hemolysis.  I discussed the case with the hospitalist service who agreed that patient will be admitted to the ICU for further management and treatment.       Amount and/or Complexity of Data Reviewed  Clinical lab tests: ordered and reviewed  Tests in the radiology section of CPT®: ordered and reviewed  Tests in the medicine section of CPT®: ordered and reviewed  Discuss the patient with other providers: yes  Independent visualization of images, tracings, or specimens: yes    Risk of Complications, Morbidity, and/or Mortality  Presenting problems: high  Diagnostic procedures: high  Management options: high    Critical Care  Total time providing critical care: 30-74 minutes    66-year-old female presents with general weakness nausea and persistent urinary tract symptoms after completing 1 week of Bactrim.  Patient is nontoxic afebrile no murmur sats 100%.  No tachycardia.  Work-up pending.  Patient be signed out to oncoming physician for final disposition.  Final diagnoses:   Acute renal failure, unspecified acute renal failure type (HCC)   Hyperkalemia       ED Disposition  ED Disposition     ED Disposition Condition Comment    Decision to Admit  Level of Care: Telemetry [5]   Diagnosis: Acute renal failure, unspecified acute renal failure type (HCC) [[id number]]   Admitting Physician: APARNA SOTO [[id number]]   Attending Physician: APARNA SOTO [[id number]]   Certification: I Certify That Inpatient Hospital Services Are Medically Necessary For Greater Than 2 Midnights            No follow-up provider specified.       Medication List      No changes were made to your prescriptions during this visit.          Jena Horton DO  10/07/21 0610

## 2021-10-07 ENCOUNTER — APPOINTMENT (OUTPATIENT)
Dept: ULTRASOUND IMAGING | Facility: HOSPITAL | Age: 66
End: 2021-10-07

## 2021-10-07 LAB
A-A DO2: 10.9 MMHG
A-A DO2: 9.9 MMHG
ANION GAP SERPL CALCULATED.3IONS-SCNC: 12.7 MMOL/L (ref 5–15)
ANION GAP SERPL CALCULATED.3IONS-SCNC: 12.9 MMOL/L (ref 5–15)
ANION GAP SERPL CALCULATED.3IONS-SCNC: 13.3 MMOL/L (ref 5–15)
ARTERIAL PATENCY WRIST A: ABNORMAL
ARTERIAL PATENCY WRIST A: ABNORMAL
ATMOSPHERIC PRESS: 738 MMHG
ATMOSPHERIC PRESS: 739 MMHG
BASE EXCESS BLDA CALC-SCNC: -10.2 MMOL/L (ref 0–2)
BASE EXCESS BLDA CALC-SCNC: -16 MMOL/L (ref 0–2)
BDY SITE: ABNORMAL
BDY SITE: ABNORMAL
BUN SERPL-MCNC: 80 MG/DL (ref 8–23)
BUN SERPL-MCNC: 92 MG/DL (ref 8–23)
BUN SERPL-MCNC: 92 MG/DL (ref 8–23)
BUN/CREAT SERPL: 18.4 (ref 7–25)
BUN/CREAT SERPL: 18.5 (ref 7–25)
BUN/CREAT SERPL: 19.2 (ref 7–25)
CALCIUM SPEC-SCNC: 10 MG/DL (ref 8.6–10.5)
CALCIUM SPEC-SCNC: 10.4 MG/DL (ref 8.6–10.5)
CALCIUM SPEC-SCNC: 9.2 MG/DL (ref 8.6–10.5)
CHLORIDE SERPL-SCNC: 113 MMOL/L (ref 98–107)
CHLORIDE SERPL-SCNC: 114 MMOL/L (ref 98–107)
CHLORIDE SERPL-SCNC: 114 MMOL/L (ref 98–107)
CO2 SERPL-SCNC: 14.7 MMOL/L (ref 22–29)
CO2 SERPL-SCNC: 19.1 MMOL/L (ref 22–29)
CO2 SERPL-SCNC: 9.3 MMOL/L (ref 22–29)
COHGB MFR BLD: <0.7 % (ref 0–2)
COHGB MFR BLD: <0.7 % (ref 0–2)
CREAT SERPL-MCNC: 4.32 MG/DL (ref 0.57–1)
CREAT SERPL-MCNC: 4.8 MG/DL (ref 0.57–1)
CREAT SERPL-MCNC: 4.99 MG/DL (ref 0.57–1)
GFR SERPL CREATININE-BSD FRML MDRD: 10 ML/MIN/1.73
GFR SERPL CREATININE-BSD FRML MDRD: 9 ML/MIN/1.73
GFR SERPL CREATININE-BSD FRML MDRD: 9 ML/MIN/1.73
GFR SERPL CREATININE-BSD FRML MDRD: ABNORMAL ML/MIN/{1.73_M2}
GLUCOSE BLDC GLUCOMTR-MCNC: 138 MG/DL (ref 70–130)
GLUCOSE BLDC GLUCOMTR-MCNC: 152 MG/DL (ref 70–130)
GLUCOSE BLDC GLUCOMTR-MCNC: 165 MG/DL (ref 70–130)
GLUCOSE BLDC GLUCOMTR-MCNC: 168 MG/DL (ref 70–130)
GLUCOSE BLDC GLUCOMTR-MCNC: 81 MG/DL (ref 70–130)
GLUCOSE SERPL-MCNC: 203 MG/DL (ref 65–99)
GLUCOSE SERPL-MCNC: 73 MG/DL (ref 65–99)
GLUCOSE SERPL-MCNC: 99 MG/DL (ref 65–99)
HBA1C MFR BLD: 6.4 % (ref 4.8–5.6)
HCO3 BLDA-SCNC: 13.7 MMOL/L (ref 22–28)
HCO3 BLDA-SCNC: 9.4 MMOL/L (ref 22–28)
HCT VFR BLD CALC: 34.3 %
HCT VFR BLD CALC: 36.7 %
INHALED O2 CONCENTRATION: 21 %
INHALED O2 CONCENTRATION: 21 %
Lab: ABNORMAL
Lab: ABNORMAL
METHGB BLD QL: 0.8 % (ref 0–1.5)
METHGB BLD QL: 0.9 % (ref 0–1.5)
MODALITY: ABNORMAL
MODALITY: ABNORMAL
NOTE: ABNORMAL
NOTE: ABNORMAL
OXYHGB MFR BLDV: 97.3 % (ref 94–99)
OXYHGB MFR BLDV: 97.5 % (ref 94–99)
PCO2 BLDA: 21.8 MM HG (ref 35–45)
PCO2 BLDA: 24.6 MM HG (ref 35–45)
PCO2 TEMP ADJ BLD: ABNORMAL MM[HG]
PCO2 TEMP ADJ BLD: ABNORMAL MM[HG]
PH BLDA: 7.24 PH UNITS (ref 7.35–7.45)
PH BLDA: 7.36 PH UNITS (ref 7.35–7.45)
PH, TEMP CORRECTED: ABNORMAL
PH, TEMP CORRECTED: ABNORMAL
PO2 BLDA: 107 MM HG (ref 75–100)
PO2 BLDA: 111 MM HG (ref 75–100)
PO2 TEMP ADJ BLD: ABNORMAL MM[HG]
PO2 TEMP ADJ BLD: ABNORMAL MM[HG]
POTASSIUM SERPL-SCNC: 5.2 MMOL/L (ref 3.5–5.2)
POTASSIUM SERPL-SCNC: 6.2 MMOL/L (ref 3.5–5.2)
POTASSIUM SERPL-SCNC: 6.5 MMOL/L (ref 3.5–5.2)
SAO2 % BLDCOA: 98.7 % (ref 94–100)
SAO2 % BLDCOA: 98.9 % (ref 94–100)
SODIUM SERPL-SCNC: 136 MMOL/L (ref 136–145)
SODIUM SERPL-SCNC: 142 MMOL/L (ref 136–145)
SODIUM SERPL-SCNC: 145 MMOL/L (ref 136–145)
VENTILATOR MODE: ABNORMAL
VENTILATOR MODE: ABNORMAL

## 2021-10-07 PROCEDURE — 82962 GLUCOSE BLOOD TEST: CPT

## 2021-10-07 PROCEDURE — 94799 UNLISTED PULMONARY SVC/PX: CPT

## 2021-10-07 PROCEDURE — 63710000001 INSULIN REGULAR HUMAN PER 5 UNITS: Performed by: FAMILY MEDICINE

## 2021-10-07 PROCEDURE — 63710000001 INSULIN ASPART PER 5 UNITS: Performed by: INTERNAL MEDICINE

## 2021-10-07 PROCEDURE — 82805 BLOOD GASES W/O2 SATURATION: CPT

## 2021-10-07 PROCEDURE — 25010000002 CEFTRIAXONE SODIUM-DEXTROSE 1-3.74 GM-%(50ML) RECONSTITUTED SOLUTION: Performed by: INTERNAL MEDICINE

## 2021-10-07 PROCEDURE — 82375 ASSAY CARBOXYHB QUANT: CPT

## 2021-10-07 PROCEDURE — 76775 US EXAM ABDO BACK WALL LIM: CPT

## 2021-10-07 PROCEDURE — 36600 WITHDRAWAL OF ARTERIAL BLOOD: CPT

## 2021-10-07 PROCEDURE — 80048 BASIC METABOLIC PNL TOTAL CA: CPT | Performed by: FAMILY MEDICINE

## 2021-10-07 PROCEDURE — 99223 1ST HOSP IP/OBS HIGH 75: CPT | Performed by: INTERNAL MEDICINE

## 2021-10-07 PROCEDURE — 93005 ELECTROCARDIOGRAM TRACING: CPT | Performed by: FAMILY MEDICINE

## 2021-10-07 PROCEDURE — 83050 HGB METHEMOGLOBIN QUAN: CPT

## 2021-10-07 RX ORDER — DEXTROSE MONOHYDRATE 25 G/50ML
25 INJECTION, SOLUTION INTRAVENOUS
Status: DISCONTINUED | OUTPATIENT
Start: 2021-10-07 | End: 2021-10-10 | Stop reason: HOSPADM

## 2021-10-07 RX ORDER — ASPIRIN 81 MG/1
81 TABLET ORAL DAILY
Status: DISCONTINUED | OUTPATIENT
Start: 2021-10-07 | End: 2021-10-10 | Stop reason: HOSPADM

## 2021-10-07 RX ORDER — METOPROLOL SUCCINATE 100 MG/1
100 TABLET, EXTENDED RELEASE ORAL DAILY
Status: DISCONTINUED | OUTPATIENT
Start: 2021-10-07 | End: 2021-10-10 | Stop reason: HOSPADM

## 2021-10-07 RX ORDER — ACETAMINOPHEN 160 MG/5ML
650 SOLUTION ORAL EVERY 4 HOURS PRN
Status: DISCONTINUED | OUTPATIENT
Start: 2021-10-07 | End: 2021-10-10 | Stop reason: HOSPADM

## 2021-10-07 RX ORDER — DEXTROSE MONOHYDRATE 25 G/50ML
50 INJECTION, SOLUTION INTRAVENOUS
Status: DISCONTINUED | OUTPATIENT
Start: 2021-10-07 | End: 2021-10-10 | Stop reason: HOSPADM

## 2021-10-07 RX ORDER — SODIUM CHLORIDE 0.9 % (FLUSH) 0.9 %
3 SYRINGE (ML) INJECTION EVERY 12 HOURS SCHEDULED
Status: DISCONTINUED | OUTPATIENT
Start: 2021-10-07 | End: 2021-10-10 | Stop reason: HOSPADM

## 2021-10-07 RX ORDER — SODIUM POLYSTYRENE SULFONATE 15 G/60ML
15 SUSPENSION ORAL; RECTAL ONCE
Status: COMPLETED | OUTPATIENT
Start: 2021-10-07 | End: 2021-10-07

## 2021-10-07 RX ORDER — NICOTINE POLACRILEX 4 MG
1 LOZENGE BUCCAL
Status: DISCONTINUED | OUTPATIENT
Start: 2021-10-07 | End: 2021-10-10 | Stop reason: HOSPADM

## 2021-10-07 RX ORDER — ONDANSETRON 2 MG/ML
4 INJECTION INTRAMUSCULAR; INTRAVENOUS EVERY 6 HOURS PRN
Status: DISCONTINUED | OUTPATIENT
Start: 2021-10-07 | End: 2021-10-10 | Stop reason: HOSPADM

## 2021-10-07 RX ORDER — CEFTRIAXONE 1 G/50ML
1 INJECTION, SOLUTION INTRAVENOUS EVERY 24 HOURS
Status: DISCONTINUED | OUTPATIENT
Start: 2021-10-07 | End: 2021-10-10

## 2021-10-07 RX ORDER — LEVOTHYROXINE SODIUM 0.05 MG/1
50 TABLET ORAL
Status: DISCONTINUED | OUTPATIENT
Start: 2021-10-07 | End: 2021-10-10 | Stop reason: HOSPADM

## 2021-10-07 RX ORDER — CHOLECALCIFEROL (VITAMIN D3) 125 MCG
5 CAPSULE ORAL NIGHTLY PRN
Status: DISCONTINUED | OUTPATIENT
Start: 2021-10-07 | End: 2021-10-10 | Stop reason: HOSPADM

## 2021-10-07 RX ORDER — ACETAMINOPHEN 325 MG/1
650 TABLET ORAL EVERY 4 HOURS PRN
Status: DISCONTINUED | OUTPATIENT
Start: 2021-10-07 | End: 2021-10-10 | Stop reason: HOSPADM

## 2021-10-07 RX ORDER — SODIUM CHLORIDE 0.9 % (FLUSH) 0.9 %
3-10 SYRINGE (ML) INJECTION AS NEEDED
Status: DISCONTINUED | OUTPATIENT
Start: 2021-10-07 | End: 2021-10-10 | Stop reason: HOSPADM

## 2021-10-07 RX ORDER — SODIUM BICARBONATE 650 MG/1
1300 TABLET ORAL 2 TIMES DAILY
Status: DISCONTINUED | OUTPATIENT
Start: 2021-10-07 | End: 2021-10-09

## 2021-10-07 RX ORDER — ACETAMINOPHEN 650 MG/1
650 SUPPOSITORY RECTAL EVERY 4 HOURS PRN
Status: DISCONTINUED | OUTPATIENT
Start: 2021-10-07 | End: 2021-10-10 | Stop reason: HOSPADM

## 2021-10-07 RX ORDER — SODIUM CHLORIDE 9 MG/ML
100 INJECTION, SOLUTION INTRAVENOUS CONTINUOUS
Status: DISCONTINUED | OUTPATIENT
Start: 2021-10-07 | End: 2021-10-09

## 2021-10-07 RX ADMIN — SODIUM CHLORIDE 1000 ML: 9 INJECTION, SOLUTION INTRAVENOUS at 02:08

## 2021-10-07 RX ADMIN — ALBUTEROL SULFATE 2.5 MG: 2.5 SOLUTION RESPIRATORY (INHALATION) at 00:00

## 2021-10-07 RX ADMIN — ASPIRIN 81 MG: 81 TABLET, COATED ORAL at 09:29

## 2021-10-07 RX ADMIN — SODIUM CHLORIDE, PRESERVATIVE FREE 3 ML: 5 INJECTION INTRAVENOUS at 09:45

## 2021-10-07 RX ADMIN — METOPROLOL SUCCINATE 100 MG: 100 TABLET, EXTENDED RELEASE ORAL at 09:28

## 2021-10-07 RX ADMIN — CEFTRIAXONE 1 G: 1 INJECTION, SOLUTION INTRAVENOUS at 18:23

## 2021-10-07 RX ADMIN — HUMAN INSULIN 10 UNITS: 100 INJECTION, SOLUTION SUBCUTANEOUS at 00:48

## 2021-10-07 RX ADMIN — LEVOTHYROXINE SODIUM 50 MCG: 50 TABLET ORAL at 09:29

## 2021-10-07 RX ADMIN — HUMAN INSULIN 10 UNITS: 100 INJECTION, SOLUTION SUBCUTANEOUS at 04:13

## 2021-10-07 RX ADMIN — SODIUM BICARBONATE 100 MEQ: 84 INJECTION, SOLUTION INTRAVENOUS at 02:08

## 2021-10-07 RX ADMIN — INSULIN ASPART 2 UNITS: 100 INJECTION, SOLUTION INTRAVENOUS; SUBCUTANEOUS at 12:30

## 2021-10-07 RX ADMIN — INSULIN ASPART 2 UNITS: 100 INJECTION, SOLUTION INTRAVENOUS; SUBCUTANEOUS at 18:15

## 2021-10-07 RX ADMIN — Medication 150 MEQ: at 03:39

## 2021-10-07 RX ADMIN — SODIUM POLYSTYRENE SULFONATE 15 G: 15 SUSPENSION ORAL; RECTAL at 04:13

## 2021-10-07 RX ADMIN — DEXTROSE MONOHYDRATE 50 ML: 25 INJECTION, SOLUTION INTRAVENOUS at 00:46

## 2021-10-07 RX ADMIN — SODIUM CHLORIDE, PRESERVATIVE FREE 3 ML: 5 INJECTION INTRAVENOUS at 20:34

## 2021-10-07 RX ADMIN — SODIUM BICARBONATE 650 MG TABLET 1300 MG: at 09:45

## 2021-10-07 RX ADMIN — SODIUM CHLORIDE 100 ML/HR: 9 INJECTION, SOLUTION INTRAVENOUS at 09:28

## 2021-10-07 RX ADMIN — DEXTROSE MONOHYDRATE 50 ML: 25 INJECTION, SOLUTION INTRAVENOUS at 04:13

## 2021-10-07 RX ADMIN — SODIUM BICARBONATE 650 MG TABLET 1300 MG: at 20:33

## 2021-10-07 RX ADMIN — INSULIN ASPART 3 UNITS: 100 INJECTION, SOLUTION INTRAVENOUS; SUBCUTANEOUS at 09:29

## 2021-10-07 NOTE — ED NOTES
Patient will be going to [facility]. RN, Viki notified.             Lisa Hartmann  10/07/21 0643       Lisa Hartmann  10/07/21 0645

## 2021-10-07 NOTE — CONSULTS
[facility]      Nephrology Consultation      Referring Provider:   No ref. provider found    Reason for Consultation:  Acute Kidney Injury and associated problems.    Subjective:  Chief complaint   Chief Complaint   Patient presents with   • Weakness - Generalized   • Urinary Tract Infection     History of present illness:    Patient is 67 yo white female with multiple medical problems as listed below in the past medical history who presented to the ER yesterday with complaints of generalized weakness and poor oral intake following recent antibiotic usage.  Per history, patient was noting persistent diarrhea and weakness 1 week ago which she felt was related to Metformin use and went to see her PCP.  At this time, Metformin was discontinued and patient was noted to have UTI.  She was given Bactrim as treatment for this.  Patient continued all other home medications including Lisinopril, HCTZ, and Aldactone.  She noted worsening of nausea and weakness to the point she felt needed to be assessed in the ER. She was noted to have SARA with BUN 95 and creatinine 5.62mg/dl and severe hyperkalemia with K of 8.6. She was emergently treated for the hyperkalemia and admitted to [facility] for management of SARA.  She did mention that she has been eating a lot of tomatoes and recently also been drinking V8 juice.  She said when she was not feeling too good she started taking ibuprofen as well.  For full details on admission please see the H+P from Dr. Nixon which was reviewed by me.  Patient denies any h/o CKD in the past and I was consulted to manage the SARA and related issues.  Today BUN and creatinine with some improvement, and electrolytes have returned to WNL.  I have reviewed labs/imaging/records from this hospitalization, including ER staff and admitting/attending physicians H/P's and progress notes to establish a comprehensive understanding of this patient's clinical hospital course, as well as to  establish plan of care appropriately.   Past Medical History:   Diagnosis Date   • Cataract    • Diabetes (HCC)     type 1   • Disease of thyroid gland    • GERD (gastroesophageal reflux disease)    • Hiatal hernia    • Hypertension        Past Surgical History:   Procedure Laterality Date   • CATARACT EXTRACTION W/ INTRAOCULAR LENS IMPLANT Right 12/19/2017    Procedure: CATARACT PHACO EXTRACTION WITH INTRAOCULAR LENS IMPLANT RIGHT;  Surgeon: Kriss Way MD;  Location: [facility];  Service:    • WISDOM TOOTH EXTRACTION       History reviewed. No pertinent family history.  Negative h/o ESRD     Social History     Tobacco Use   • Smoking status: Never Smoker   • Smokeless tobacco: Never Used   Substance Use Topics   • Alcohol use: No   • Drug use: No     Home medications:   Prior to Admission Medications     Prescriptions Last Dose Informant Patient Reported? Taking?    amLODIPine (NORVASC) 10 MG tablet   Yes Yes    Take 10 mg by mouth Daily.    hydroCHLOROthiazide (HYDRODIURIL) 25 MG tablet   Yes Yes    Take 25 mg by mouth Daily.    metoprolol succinate XL (TOPROL-XL) 100 MG 24 hr tablet   Yes Yes    Take 100 mg by mouth Daily.    omeprazole (priLOSEC) 20 MG capsule   Yes Yes    Take 20 mg by mouth Daily.    spironolactone (ALDACTONE) 25 MG tablet   Yes Yes    Take 25 mg by mouth Daily.    aspirin 81 MG EC tablet  Self Yes No    Take 81 mg by mouth Daily.    insulin degludec (TRESIBA FLEXTOUCH) 100 UNIT/ML solution pen-injector injection  Self Yes No    Inject 60 Units under the skin Daily.    insulin lispro (humaLOG) 100 UNIT/ML injection  Self Yes No    Inject 10 Units under the skin 3 (Three) Times a Day Before Meals.    levothyroxine (SYNTHROID, LEVOTHROID) 50 MCG tablet  Self Yes No    Take 50 mcg by mouth Daily.    lisinopril (PRINIVIL,ZESTRIL) 40 MG tablet  Self Yes No    Take 40 mg by mouth Daily.    metoprolol tartrate (LOPRESSOR) 100 MG tablet  Self Yes No    Take 100 mg by mouth Daily.     moxifloxacin (VIGAMOX) 0.5 % ophthalmic solution   Yes No    1 drop 3 (Three) Times a Day.    raNITIdine (ZANTAC) 150 MG tablet  Self Yes No    Take 150 mg by mouth Daily.    verapamil SR (CALAN-SR) 240 MG CR tablet  Self Yes No    Take 240 mg by mouth Daily.        Emergency department medications:   Medications   sodium chloride 0.9 % flush 10 mL (has no administration in time range)   dextrose (D50W) 25 g/ 50mL Intravenous Solution 50 mL (50 mL Intravenous Given 10/6/21 2046)   dextrose (D50W) 25 g/ 50mL Intravenous Solution 50 mL (50 mL Intravenous Given 10/6/21 2221)   dextrose (D50W) 25 g/ 50mL Intravenous Solution 50 mL (50 mL Intravenous Given 10/7/21 0046)   dextrose (D50W) 25 g/ 50mL Intravenous Solution 50 mL (50 mL Intravenous Given 10/7/21 0413)   aspirin EC tablet 81 mg (has no administration in time range)   levothyroxine (SYNTHROID, LEVOTHROID) tablet 50 mcg (has no administration in time range)   metoprolol succinate XL (TOPROL-XL) 24 hr tablet 100 mg (has no administration in time range)   sodium chloride 0.9 % flush 3 mL (has no administration in time range)   sodium chloride 0.9 % flush 3-10 mL (has no administration in time range)   acetaminophen (TYLENOL) tablet 650 mg (has no administration in time range)     Or   acetaminophen (TYLENOL) 160 MG/5ML solution 650 mg (has no administration in time range)     Or   acetaminophen (TYLENOL) suppository 650 mg (has no administration in time range)   ondansetron (ZOFRAN) injection 4 mg (has no administration in time range)   enoxaparin (LOVENOX) syringe 30 mg (has no administration in time range)   sodium chloride 0.9 % infusion (has no administration in time range)   melatonin tablet 5 mg (has no administration in time range)   dextrose (GLUTOSE) oral gel 1 tube (has no administration in time range)   dextrose (D50W) 25 g/ 50mL Intravenous Solution 25 g (has no administration in time range)   glucagon (human recombinant) (GLUCAGEN DIAGNOSTIC)  injection 1 mg (has no administration in time range)   insulin aspart (novoLOG) injection 0-7 Units (has no administration in time range)   cefTRIAXone (ROCEPHIN) IVPB 1 g/50ml dextrose (premix) (has no administration in time range)   sodium chloride 0.9 % bolus 1,000 mL (0 mL Intravenous Stopped 10/7/21 0139)   cefTRIAXone (ROCEPHIN) IVPB 1 g/50ml dextrose (premix) (0 g Intravenous Stopped 10/6/21 2002)   sodium chloride 0.9 % bolus 1,000 mL (0 mL Intravenous Stopped 10/7/21 0414)   insulin regular (humuLIN R,novoLIN R) injection 10 Units (10 Units Intravenous Given 10/6/21 2046)   calcium gluconate injection 1 g (1 g Intravenous Given 10/6/21 2047)   sodium polystyrene (KAYEXALATE) 15 GM/60ML suspension 30 g (30 g Oral Given 10/6/21 2052)   insulin regular (humuLIN R,novoLIN R) injection 10 Units (10 Units Intravenous Given 10/6/21 2221)   calcium gluconate injection 1 g (1 g Intravenous Given 10/6/21 2221)   albuterol (PROVENTIL) nebulizer solution 0.083% 2.5 mg/3mL (2.5 mg Nebulization Given 10/6/21 2217)   sodium chloride 0.9 % bolus 1,000 mL (0 mL Intravenous Stopped 10/7/21 0414)   insulin regular (humuLIN R,novoLIN R) injection 10 Units (10 Units Intravenous Given 10/7/21 0048)   albuterol (PROVENTIL) nebulizer solution 0.083% 2.5 mg/3mL (2.5 mg Nebulization Given 10/7/21 0000)   sodium bicarbonate injection 8.4% 100 mEq (100 mEq Intravenous Given 10/7/21 0208)   sodium polystyrene (KAYEXALATE) 15 GM/60ML suspension 15 g (15 g Oral Given 10/7/21 0413)   insulin regular (humuLIN R,novoLIN R) injection 10 Units (10 Units Intravenous Given 10/7/21 0413)       Allergies:  Patient has no known allergies.    Review of Systems    1. Constitutional: Negative for fever and chills.  Denies any diaphoresis. Complains of fatigue and malaise. Denies any unexpected weight change.   2. HENT: Negative for congestion and hearing loss.   3. Eyes: Negative for redness and visual disturbance.   4. Respiratory: Negative for  "shortness of breath or cough. Negative for chest pain  5. Cardiovascular: Negative for chest pain and chest tightness or palpitations.   6. Gastrointestinal: Negative for abdominal pain or distention, and blood in stool. There is history of nausea, diarrhea, and poor oral intake   7. Endocrine: Negative for heat or cold intolerance.   8. Genitourinary: Negative for difficulty urinating, dysuria and frequency.   9. Musculoskeletal: Negative for arthralgias, back pain.  Denies any myalgias.   10. Skin: Negative for color change, rash and wound.   11. Neurological: Negative for syncope, and headaches.   12. Hematological: Negative for adenopathy. Does not bruise/bleed easily.   13. Psychiatric/Behavioral: Negative for confusion. The patient is not nervous/anxious.     Objective:  Vital Signs  /70   Pulse 65   Temp 97.6 °F (36.4 °C) (Oral)   Resp 20   Ht 162.6 cm (64\")   Wt 77.1 kg (170 lb)   SpO2 99%   BMI 29.18 kg/m²          No intake/output data recorded.    Intake/Output Summary (Last 24 hours) at 10/7/2021 0745  Last data filed at 10/7/2021 0414  Gross per 24 hour   Intake 3050 ml   Output --   Net 3050 ml       Physical Exam:  General Appearance:   Alert, cooperative, in no acute distress.     Head:   Normocephalic, without obvious abnormality, atraumatic.     Eyes:      Normal, conjunctivae and sclerae, no icterus, no pallor, corneas clear, PERRLA        Throat:   Oral mucosa dry      Neck:  No adenopathy, supple, trachea midline, no thyromegaly, no carotid bruit, no JVD        Lungs:    Clear to auscultation and fair air movement noted.      Heart::   Regular rhythm and normal rate, normal S1 and S2.       Abdomen:   Obese. Normal bowel sounds, no masses, no organomegaly, soft non-tender, non-distended, no guarding, no rebound tenderness      Genital urinary:   No urinary bladder palpable      Extremities:  Moves all extremities, no edema, no cyanosis, no redness.     Pulses:  Pulses palpable and " equal bilaterally but weak.     Skin:  No bleeding, bruising or rash        Neurologic:  Cranial nerves grossly intact, move all extremities         Results Review:   Results from last 7 days   Lab Units 10/07/21  0551 10/07/21  0217 10/06/21  2354 10/06/21  2202 10/06/21  1854   SODIUM mmol/L 142 145 136   < > 130*   POTASSIUM mmol/L 5.2 6.2* 6.5*   < > 8.6*   CHLORIDE mmol/L 114* 113* 114*   < > 109*   CO2 mmol/L 14.7* 19.1* 9.3*   < > 10.7*   BUN mg/dL 80* 92* 92*   < > 98*   CREATININE mg/dL 4.32* 4.80* 4.99*   < > 5.62*   CALCIUM mg/dL 9.2 10.0 10.4   < > 9.8   ALBUMIN g/dL  --   --   --   --  4.50   BILIRUBIN mg/dL  --   --   --   --  0.2   ALK PHOS U/L  --   --   --   --  67   ALT (SGPT) U/L  --   --   --   --  15   AST (SGOT) U/L  --   --   --   --  18   GLUCOSE mg/dL 203* 73 99   < > 188*    < > = values in this interval not displayed.     Estimated Creatinine Clearance: 12.7 mL/min (A) (by C-G formula based on SCr of 4.32 mg/dL (H)).  Results from last 7 days   Lab Units 10/06/21  1854   MAGNESIUM mg/dL 2.3         Results from last 7 days   Lab Units 10/06/21  1813   WBC 10*3/mm3 13.76*   HEMOGLOBIN g/dL 13.7   PLATELETS 10*3/mm3 247         Brief Urine Lab Results  (Last result in the past 365 days)      Color   Clarity   Blood   Leuk Est   Nitrite   Protein   CREAT   Urine HCG        10/06/21 1813 Yellow Clear Negative Small (1+) Negative Negative             No results found for: UTPCR  Imaging Results (Last 24 Hours)     Procedure Component Value Units Date/Time    XR Chest 1 View [[id number]] Resulted: 10/06/21 1832     Updated: 10/06/21 1832        aspirin, 81 mg, Oral, Daily  cefTRIAXone, 1 g, Intravenous, Q24H  enoxaparin, 30 mg, Subcutaneous, Nightly  insulin aspart, 0-7 Units, Subcutaneous, TID AC  levothyroxine, 50 mcg, Oral, Q AM  metoprolol succinate XL, 100 mg, Oral, Daily  sodium chloride, 3 mL, Intravenous, Q12H      sodium chloride, 100 mL/hr        Assessment/Plan:    1. Acute renal  failure (HCC): It is likely multifactorial patient was on ACE inhibitor, hydrochlorothiazide, spironolactone, lisinopril, Bactrim and was taking nonsteroidals as well.  She was very symptomatic on initial presentation with unable to move her muscles.  She was managed conservatively in the ER and has significant improvement in her potassium.  2. Chronic kidney disease stage III: She said she has known to have stage II or stage III chronic kidney disease and is seeing me back few years ago but has not followed up.  3. Type 2 diabetes: She said she has tried very hard and finally her hemoglobin A1c was 6.6 that was recently checked.  4. Hyperkalemia: Combination of things as noted above in acute renal failure.  5. Metabolic acidosis: She received multiple doses of IV bicarb and that has corrected most of it.  6. Hypertension: Under reasonable control, hold off giving any more ACE inhibitor's at this point.  7. Hypothyroidism:  8. Gastroesophageal reflux disease:    Risk and complexity: High      Plan:  · Continue with normal saline for now, will start her on oral sodium bicarb until the serum bicarb gets above 20.  She was educated on different medications and effects as well as diet.  · Continue with rest of the current treatment plan and surveillance labs.  · Details were discussed with the patient no family in the room.    · Details were also discussed with the hospitalist service.   · Further recommendations will depend on clinical course of the patient during the current hospitalization.    · I also discussed the details with the nursing staff.  · Rest as ordered.    In closing, I sincerely appreciate opportunity to participate in care of this patient. If I can be of any further assistance with the management of this patient, please don’t hesitate to contact me.    Tres Estrella MD, FASEDDY    10/07/21  07:45 EDT    Dictated using Dragon.

## 2021-10-07 NOTE — PLAN OF CARE
Goal Outcome Evaluation  New admit for ARF, UTI, and hyperkalemia. Lizeth is A+Ox 4. She denies pain. Sodium bicarb and NS IVF's continue. Glucose monitored and managed per MAR.

## 2021-10-07 NOTE — H&P
[facility]   HISTORY AND PHYSICAL      Name:  Lizeth Bhardwaj   Age:  66 y.o.  Sex:  female  :  1955  MRN:  [id number]   Visit Number:  [id number]  Admission Date:  10/6/2021  Date Of Service:  10/07/21  Primary Care Physician:  Jessica Castano APRN (Inactive)    Chief Complaint:   Generalized weakness    History Of Presenting Illness:    Patient is a very pleasant 66-year-old female with history of hypertension, type 2 diabetes mellitus, GERD and hypothyroidism who presented to the ER today with complaints of generalized weakness.  She states that about a week ago she started feeling poorly with fatigue, dysuria and persistent diarrhea which she attributed to Metformin.  She went to see her primary care physician who stopped the Metformin and checked a urinalysis which was suggestive of an UTI.  She was then placed on Bactrim which she has been taking with resolution of the dysuria.  However she has been quite nauseous throughout this whole time and feeling worse in terms of fatigue, malaise and generalized weakness.  Her appetite has been quite poor and she has not been eating or drinking much. She reports no fever but does state that she has had some chills.  She denies any abdominal pain and states that her diarrhea has improved a little. She is a non-smoker, does not use recreational drugs and does not drink alcohol.  Medication review shows that she is on lisinopril, spironolactone, hydrochlorothiazide which could be contributing to her current symptoms.    Patient had normal vital signs on arrival to the ER.  Her labs had significant abnormalities including BUN/creatinine 98/5.62 with no known baseline, sodium 130, potassium 8.6 and a nonhemolyzed sample, serum bicarb 10, WBC 13.7.  Normal magnesium.  pH was 7.24.  Troponin is negative.  Urinalysis was positive for leuk esterase with 21-30 WBCs and 1+ bacteria.  EKG shows sinus bradycardia without any peaked T waves.  She  was given 4 rounds of insulin/dextrose 50, multiple rounds of albuterol, 2 rounds of calcium gluconate, 2 doses of Kayexalate and 3 L of IV fluid.  She was also given 2 A of sodium bicarb and placed on a bicarb drip.  Serial BMPs were obtained and her latest potassium at 2 AM is 6.2 with repeat potassium pending currently.  She is being admitted to the hospitalist service for further care.    Review Of Systems:    All systems were reviewed and negative except as mentioned in history of presenting illness, assessment and plan.    Past Medical History: Patient  has a past medical history of Cataract, Diabetes (HCC), Disease of thyroid gland, GERD (gastroesophageal reflux disease), Hiatal hernia, and Hypertension.    Past Surgical History: Patient  has a past surgical history that includes Lititz tooth extraction and Cataract extraction w/ intraocular lens implant (Right, 12/19/2017).    Social History: Patient  reports that she has never smoked. She has never used smokeless tobacco. She reports that she does not drink alcohol and does not use drugs.    Family History: Patient's reviewed and is noncontributory.  Allergies:      Patient has no known allergies.    Home Medications:    Prior to Admission Medications     Prescriptions Last Dose Informant Patient Reported? Taking?    amLODIPine (NORVASC) 10 MG tablet   Yes Yes    Take 10 mg by mouth Daily.    hydroCHLOROthiazide (HYDRODIURIL) 25 MG tablet   Yes Yes    Take 25 mg by mouth Daily.    metoprolol succinate XL (TOPROL-XL) 100 MG 24 hr tablet   Yes Yes    Take 100 mg by mouth Daily.    omeprazole (priLOSEC) 20 MG capsule   Yes Yes    Take 20 mg by mouth Daily.    spironolactone (ALDACTONE) 25 MG tablet   Yes Yes    Take 25 mg by mouth Daily.    aspirin 81 MG EC tablet  Self Yes No    Take 81 mg by mouth Daily.    insulin degludec (TRESIBA FLEXTOUCH) 100 UNIT/ML solution pen-injector injection  Self Yes No    Inject 60 Units under the skin Daily.    insulin lispro  (humaLOG) 100 UNIT/ML injection  Self Yes No    Inject 10 Units under the skin 3 (Three) Times a Day Before Meals.    levothyroxine (SYNTHROID, LEVOTHROID) 50 MCG tablet  Self Yes No    Take 50 mcg by mouth Daily.    lisinopril (PRINIVIL,ZESTRIL) 40 MG tablet  Self Yes No    Take 40 mg by mouth Daily.    metoprolol tartrate (LOPRESSOR) 100 MG tablet  Self Yes No    Take 100 mg by mouth Daily.    moxifloxacin (VIGAMOX) 0.5 % ophthalmic solution   Yes No    1 drop 3 (Three) Times a Day.    raNITIdine (ZANTAC) 150 MG tablet  Self Yes No    Take 150 mg by mouth Daily.    verapamil SR (CALAN-SR) 240 MG CR tablet  Self Yes No    Take 240 mg by mouth Daily.        ED Medications:    Medications   sodium chloride 0.9 % flush 10 mL (has no administration in time range)   dextrose (D50W) 25 g/ 50mL Intravenous Solution 50 mL (50 mL Intravenous Given 10/6/21 2046)   dextrose (D50W) 25 g/ 50mL Intravenous Solution 50 mL (50 mL Intravenous Given 10/6/21 2221)   dextrose (D50W) 25 g/ 50mL Intravenous Solution 50 mL (50 mL Intravenous Given 10/7/21 0046)   dextrose (D50W) 25 g/ 50mL Intravenous Solution 50 mL (50 mL Intravenous Given 10/7/21 0413)   sodium chloride 0.9 % bolus 1,000 mL (0 mL Intravenous Stopped 10/7/21 0139)   cefTRIAXone (ROCEPHIN) IVPB 1 g/50ml dextrose (premix) (0 g Intravenous Stopped 10/6/21 2002)   sodium chloride 0.9 % bolus 1,000 mL (0 mL Intravenous Stopped 10/7/21 0414)   insulin regular (humuLIN R,novoLIN R) injection 10 Units (10 Units Intravenous Given 10/6/21 2046)   calcium gluconate injection 1 g (1 g Intravenous Given 10/6/21 2047)   sodium polystyrene (KAYEXALATE) 15 GM/60ML suspension 30 g (30 g Oral Given 10/6/21 2052)   insulin regular (humuLIN R,novoLIN R) injection 10 Units (10 Units Intravenous Given 10/6/21 2221)   calcium gluconate injection 1 g (1 g Intravenous Given 10/6/21 2221)   albuterol (PROVENTIL) nebulizer solution 0.083% 2.5 mg/3mL (2.5 mg Nebulization Given 10/6/21 2217)    sodium chloride 0.9 % bolus 1,000 mL (0 mL Intravenous Stopped 10/7/21 0414)   insulin regular (humuLIN R,novoLIN R) injection 10 Units (10 Units Intravenous Given 10/7/21 0048)   albuterol (PROVENTIL) nebulizer solution 0.083% 2.5 mg/3mL (2.5 mg Nebulization Given 10/7/21 0000)   sodium bicarbonate injection 8.4% 100 mEq (100 mEq Intravenous Given 10/7/21 0208)   sodium polystyrene (KAYEXALATE) 15 GM/60ML suspension 15 g (15 g Oral Given 10/7/21 0413)   insulin regular (humuLIN R,novoLIN R) injection 10 Units (10 Units Intravenous Given 10/7/21 0413)     Vital Signs:  Temp:  [97.6 °F (36.4 °C)] 97.6 °F (36.4 °C)  Heart Rate:  [53-81] 65  Resp:  [18-20] 20  BP: (110-139)/() 118/70        10/06/21  1744   Weight: 77.1 kg (170 lb)     Body mass index is 29.18 kg/m².    Physical Exam:     General Appearance:  Alert and cooperative.    Head:  Atraumatic and normocephalic.   Eyes: Conjunctivae and sclerae normal, no icterus. No pallor.   Ears:  Ears with no abnormalities noted.   Throat: No oral lesions, no thrush, oral mucosa moist.   Neck: Supple, trachea midline, no thyromegaly.   Back:   No kyphoscoliosis present. No tenderness to palpation.   Lungs:   Breath sounds heard bilaterally equally.  No crackles or wheezing. No Pleural rub or bronchial breathing.   Heart:  Normal S1 and S2, no murmur, no gallop, no rub. No JVD.   Abdomen:   Normal bowel sounds, no masses, no organomegaly. Soft, nontender, nondistended, no rebound tenderness.   Extremities: Supple, no edema, no cyanosis, no clubbing.   Pulses: Pulses palpable bilaterally.   Skin: No bleeding or rash.   Neurologic: Alert and oriented x 3. No facial asymmetry. Moves all four limbs. No tremors.      Laboratory data:    I have reviewed the labs done in the emergency room.    Results from last 7 days   Lab Units 10/07/21  0551 10/07/21  0217 10/06/21  7654 10/06/21  2202 10/06/21  1854   SODIUM mmol/L 142 145 136   < > 130*   POTASSIUM mmol/L 5.2 6.2*  6.5*   < > 8.6*   CHLORIDE mmol/L 114* 113* 114*   < > 109*   CO2 mmol/L 14.7* 19.1* 9.3*   < > 10.7*   BUN mg/dL 80* 92* 92*   < > 98*   CREATININE mg/dL 4.32* 4.80* 4.99*   < > 5.62*   CALCIUM mg/dL 9.2 10.0 10.4   < > 9.8   BILIRUBIN mg/dL  --   --   --   --  0.2   ALK PHOS U/L  --   --   --   --  67   ALT (SGPT) U/L  --   --   --   --  15   AST (SGOT) U/L  --   --   --   --  18   GLUCOSE mg/dL 203* 73 99   < > 188*    < > = values in this interval not displayed.     Results from last 7 days   Lab Units 10/06/21  1813   WBC 10*3/mm3 13.76*   HEMOGLOBIN g/dL 13.7   HEMATOCRIT % 40.9   PLATELETS 10*3/mm3 247         Results from last 7 days   Lab Units 10/06/21  1854   TROPONIN T ng/mL <0.010                 Results from last 7 days   Lab Units 10/07/21  0556   PH, ARTERIAL pH units 7.355   PO2 ART mm Hg 107.0*   PCO2, ARTERIAL mm Hg 24.6*   HCO3 ART mmol/L 13.7*     Results from last 7 days   Lab Units 10/06/21  1813   COLOR UA  Yellow   GLUCOSE UA  Negative   KETONES UA  Negative   LEUKOCYTES UA  Small (1+)*   PH, URINE  <=5.0   BILIRUBIN UA  Negative   UROBILINOGEN UA  0.2 E.U./dL   RBC UA /HPF None Seen   WBC UA /HPF 21-30*       Pain Management Panel    There is no flowsheet data to display.         Radiology:    No radiology results for the last 3 days    Assessment:  1.  Acute hyperkalemia, present on admission, likely due to Bactrim  2.  Acute renal failure, likely combination of Bactrim, dehydration and lack of autoregulation in the setting of chronic ACE inhibitor use  3.  Non-anion gap metabolic acidosis  4.  Essential hypertension  5.  Type 2 diabetes mellitus  6.  Hypothyroidism  7.  GERD    Plan:  Patient will be admitted to the intensive care unit.  Potassium level is trending down in the right direction and so far she has not had any significant EKG changes. Medication review shows that she is on lisinopril, spironolactone and hydrochlorothiazide which could be contributing to acute renal failure  and hyperkalemia.  We will continue with normal saline at 100 cc/h.  We will stop sodium bicarbonate drip.  Follow-up repeat potassium level this a.m.  Will consult nephrology.  Will order renal ultrasound.  Avoid nephrotoxic agents.  We will continue with ceftriaxone 1 g daily as her UA is still suggestive of an UTI.  Follow-up urine culture.  Follow-up A1c.  Will institute insulin sliding scale.  Resumed all other home meds as appropriate.    Risk Assessment: High  DVT Prophylaxis: Lovenox  Code Status: Full  Diet:     Advance Care Planning   ACP discussion was held with the patient during this visit. Patient does not have an advance directive, information provided.      Estephanie Nixon MD  10/07/21  06:36 EDT    Dictated utilizing Dragon dictation.

## 2021-10-07 NOTE — PROGRESS NOTES
[facility]   [facility]LLOW [facility] NOTE    Name:  Lizeth Bhardwaj   Age:  66 y.o.  Sex:  female  :  1955  MRN:  [id number]   Visit Number:  [id number]  Admission Date:  10/6/2021  Date Of Service:  10/07/21  Primary Care Physician:  Sebastian Norman MD    Patient was seen and examined. Pertinent laboratory and radiology data were reviewed.    Vital signs:    Vital Signs (last 24 hours)       10/06 0700  -  10/07 0659 10/07 0700  -  10/07 1504   Most Recent    Temp (°F)   97.6    97.4 -  97.6     97.6 (36.4)    Heart Rate 53 -  81    72 -  76     76    Resp 18 -  20    16 -  17     16    /91 -  139/68    126/64 -  154/72     154/72    SpO2 (%) 97 -  100    98 -  100     98        Physical Exam:      General Appearance:  Alert and cooperative.    Head:  Atraumatic and normocephalic.   Eyes: Conjunctivae and sclerae normal, no icterus. No pallor.   Ears:  Ears with no abnormalities noted.   Throat: No oral lesions, no thrush, oral mucosa moist.   Neck: Supple, trachea midline   Back:   No kyphoscoliosis present. No tenderness to palpation.   Lungs:   Breath sounds heard bilaterally equally.  No crackles or wheezing.    Heart:  Normal S1 and S2, no murmur, no gallop, no rub. No JVD.   Abdomen:   Normal bowel sounds, no masses, no organomegaly. Soft, nontender, nondistended, no rebound tenderness.   Extremities: Supple, no edema, no cyanosis, no clubbing.   Pulses: Pulses palpable bilaterally.   Skin: No bleeding or rash.   Neurologic: Alert and oriented x 3. No facial asymmetry. Moves all four limbs.         Patient admitted to telemetry for further evaluation with potassium stabilizing on recheck this morning at 5.2.  IV fluids continued.  Bicarb drip stopped and patient transitioned to oral Bicarb.  Appreciate nephrology consulting and making recommendations.  A1c-6.4.  Renal U/S pending.  Recheck CBC/BMP in am.      NIDIA Fox  10/07/21  15:04 EDT    Dictated  utilizing Dragon dictation.

## 2021-10-07 NOTE — ED NOTES
Contacted MD regarding new med orders, 2nd BMP after 2nd round of K+ treatment has not resulted. Per MD hold 3rd treatment until BMP results     Viki Chavez RN  10/06/21 4105

## 2021-10-07 NOTE — CASE MANAGEMENT/SOCIAL WORK
Discharge Planning Assessment  [facility]     Patient Name: Lizeth Bhardwaj  MRN: [id number]  Today's Date: 10/7/2021    Admit Date: 10/6/2021    Discharge Needs Assessment     Row Name 10/07/21 1232       Living Environment    Lives With  alone    Current Living Arrangements  home/apartment/condo    Primary Care Provided by  self    Provides Primary Care For  no one    Family Caregiver if Needed  child(rhonda), adult    Family Caregiver Names  Silvina Badillo (Daughter)    Quality of Family Relationships  supportive;helpful;involved    Able to Return to Prior Arrangements  yes       Resource/Environmental Concerns    Resource/Environmental Concerns  none       Transition Planning    Patient/Family Anticipates Transition to  home    Patient/Family Anticipated Services at Transition  none    Transportation Anticipated  family or friend will provide       Discharge Needs Assessment    Readmission Within the Last 30 Days  no previous admission in last 30 days    Equipment Currently Used at Home  none    Concerns to be Addressed  no discharge needs identified    Equipment Needed After Discharge  none    Discharge Coordination/Progress  Anticipates discharging home when ready.        Discharge Plan     Row Name 10/07/21 1236       Plan    Plan Comments SW met with pt at bedside to complete discharge planning. Confirmed address as listed in chart. Pt lives alone and reports being independent at home. Pt states that she does not use any equipment and reports no need for any. Pt declined needing inpatient rehab or home health. Pt's PCP is Dr. Norman. Pt reports that her daughter is a big support for her. Pt reports no needs at this time. Family will transport at discharge. No other CM needs have been identified at this time. SW to continue to follow and assist as needed.        Continued Care and Services - Admitted Since 10/6/2021    Coordination has not been started for this encounter.         Demographic Summary      Row Name 10/07/21 1230       General Information    Admission Type  inpatient    Arrived From  emergency department    Required Notices Provided  Important Message from Medicare    Referral Source  admission list    Reason for Consult  discharge planning    Preferred Language  English     Used During This Interaction  no        Functional Status     Row Name 10/07/21 1230       Functional Status    Usual Activity Tolerance  good       Functional Status, IADL    Medications  independent    Meal Preparation  independent    Housekeeping  independent    Laundry  independent    Shopping  independent       Employment/    Employment Status  employed full-time        Psychosocial     Row Name 10/07/21 1231       Coping/Stress    Patient Personal Strengths  positive attitude;strong support system    Sources of Support  adult child(rhonda);sibling(s)    Reaction to Health Status  accepting    Understanding of Condition and Treatment  adequate understanding of medical condition       Developmental Stage (Eriksson's)    Developmental Stage  Stage 8 (64 years-death/Late Adulthood) Integrity vs. Despair        Abuse/Neglect    No documentation.       Legal    No documentation.       Substance Abuse    No documentation.       Patient Forms    No documentation.           ANA ROSA Hernandez

## 2021-10-07 NOTE — PAYOR COMM NOTE
"Lizeth Jimenez (66 y.o. Female)     Date of Birth Social Security Number Address Home Phone MRN    1955  [address] [place] [address] [phone] [id number]    [employer] Marital Status          Synagogue        Admission Date Admission Type Admitting Provider Attending Provider Department, Room/Bed    10/6/21 Emergency Estephanie Nixon MD Hamm, Stephanie B, APRN [facility] COVID19 SURGE ICU ON PREOP PHASE II, P05/1    Discharge Date Discharge Disposition Discharge Destination                       Attending Provider: Karely Allen APRN    Allergies: No Known Allergies    Isolation: None   Infection: None   Code Status: CPR    Ht: 162.6 cm (64\")   Wt: 77.1 kg (170 lb)    Admission Cmt: None   Principal Problem: None                Active Insurance as of 10/6/2021     Primary Coverage     Payor Plan Insurance Group Employer/Plan Group    HUMANA MEDICARE REPLACEMENT HUMANA MEDICARE REPLACEMENT [id number]     Payor Plan Address Payor Plan Phone Number Payor Plan Fax Number Effective Dates    [address] [address] [phone]  8/1/2019 - None Entered    [facility] [id number]       Subscriber Name Subscriber Birth Date Member ID       LIZETH JIMENEZ 1955 [id number]           Secondary Coverage     Payor Plan Insurance Group Employer/Plan Group    [place]      Payor Plan Address Payor Plan Phone Number Payor Plan Fax Number Effective Dates    [address] [phone]  6/9/2021 - None Entered    [facility] [address]       Subscriber Name Subscriber Birth Date Member ID       LIZETH JIMENEZ 1955 [id number]                 Emergency Contacts      (Rel.) Home Phone Work Phone Mobile Phone    Silvina Badillo (Daughter) [phone] -- --    DomingoComfort (Sister) [phone] -- --      Fax [phone]         History & Physical      Estephanie Nixon MD at 10/07/21 0620              [facility] HOSPITALIST   HISTORY " AND PHYSICAL      Name:  Lizeth Bhardwaj   Age:  66 y.o.  Sex:  female  :  1955  MRN:  [id number]   Visit Number:  [id number]  Admission Date:  10/6/2021  Date Of Service:  10/07/21  Primary Care Physician:  Jessica Castano APRN (Inactive)    Chief Complaint:   Generalized weakness    History Of Presenting Illness:    Patient is a very pleasant 66-year-old female with history of hypertension, type 2 diabetes mellitus, GERD and hypothyroidism who presented to the ER today with complaints of generalized weakness.  She states that about a week ago she started feeling poorly with fatigue, dysuria and persistent diarrhea which she attributed to Metformin.  She went to see her primary care physician who stopped the Metformin and checked a urinalysis which was suggestive of an UTI.  She was then placed on Bactrim which she has been taking with resolution of the dysuria.  However she has been quite nauseous throughout this whole time and feeling worse in terms of fatigue, malaise and generalized weakness.  Her appetite has been quite poor and she has not been eating or drinking much. She reports no fever but does state that she has had some chills.  She denies any abdominal pain and states that her diarrhea has improved a little. She is a non-smoker, does not use recreational drugs and does not drink alcohol.  Medication review shows that she is on lisinopril, spironolactone, hydrochlorothiazide which could be contributing to her current symptoms.    Patient had normal vital signs on arrival to the ER.  Her labs had significant abnormalities including BUN/creatinine 98/5.62 with no known baseline, sodium 130, potassium 8.6 and a nonhemolyzed sample, serum bicarb 10, WBC 13.7.  Normal magnesium.  pH was 7.24.  Troponin is negative.  Urinalysis was positive for leuk esterase with 21-30 WBCs and 1+ bacteria.  EKG shows sinus bradycardia without any peaked T waves.  She was given 4 rounds of insulin/dextrose 50,  multiple rounds of albuterol, 2 rounds of calcium gluconate, 2 doses of Kayexalate and 3 L of IV fluid.  She was also given 2 A of sodium bicarb and placed on a bicarb drip.  Serial BMPs were obtained and her latest potassium at 2 AM is 6.2 with repeat potassium pending currently.  She is being admitted to the hospitalist service for further care.    Review Of Systems:    All systems were reviewed and negative except as mentioned in history of presenting illness, assessment and plan.    Past Medical History: Patient  has a past medical history of Cataract, Diabetes (HCC), Disease of thyroid gland, GERD (gastroesophageal reflux disease), Hiatal hernia, and Hypertension.    Past Surgical History: Patient  has a past surgical history that includes [place] tooth extraction and Cataract extraction w/ intraocular lens implant (Right, 12/19/2017).    Social History: Patient  reports that she has never smoked. She has never used smokeless tobacco. She reports that she does not drink alcohol and does not use drugs.    Family History: Patient's reviewed and is noncontributory.  Allergies:      Patient has no known allergies.    Home Medications:    Prior to Admission Medications     Prescriptions Last Dose Informant Patient Reported? Taking?    amLODIPine (NORVASC) 10 MG tablet   Yes Yes    Take 10 mg by mouth Daily.    hydroCHLOROthiazide (HYDRODIURIL) 25 MG tablet   Yes Yes    Take 25 mg by mouth Daily.    metoprolol succinate XL (TOPROL-XL) 100 MG 24 hr tablet   Yes Yes    Take 100 mg by mouth Daily.    omeprazole (priLOSEC) 20 MG capsule   Yes Yes    Take 20 mg by mouth Daily.    spironolactone (ALDACTONE) 25 MG tablet   Yes Yes    Take 25 mg by mouth Daily.    aspirin 81 MG EC tablet  Self Yes No    Take 81 mg by mouth Daily.    insulin degludec (TRESIBA FLEXTOUCH) 100 UNIT/ML solution pen-injector injection  Self Yes No    Inject 60 Units under the skin Daily.    insulin lispro (humaLOG) 100 UNIT/ML injection  Self Yes  No    Inject 10 Units under the skin 3 (Three) Times a Day Before Meals.    levothyroxine (SYNTHROID, LEVOTHROID) 50 MCG tablet  Self Yes No    Take 50 mcg by mouth Daily.    lisinopril (PRINIVIL,ZESTRIL) 40 MG tablet  Self Yes No    Take 40 mg by mouth Daily.    metoprolol tartrate (LOPRESSOR) 100 MG tablet  Self Yes No    Take 100 mg by mouth Daily.    moxifloxacin (VIGAMOX) 0.5 % ophthalmic solution   Yes No    1 drop 3 (Three) Times a Day.    raNITIdine (ZANTAC) 150 MG tablet  Self Yes No    Take 150 mg by mouth Daily.    verapamil SR (CALAN-SR) 240 MG CR tablet  Self Yes No    Take 240 mg by mouth Daily.        ED Medications:    Medications   sodium chloride 0.9 % flush 10 mL (has no administration in time range)   dextrose (D50W) 25 g/ 50mL Intravenous Solution 50 mL (50 mL Intravenous Given 10/6/21 2046)   dextrose (D50W) 25 g/ 50mL Intravenous Solution 50 mL (50 mL Intravenous Given 10/6/21 2221)   dextrose (D50W) 25 g/ 50mL Intravenous Solution 50 mL (50 mL Intravenous Given 10/7/21 0046)   dextrose (D50W) 25 g/ 50mL Intravenous Solution 50 mL (50 mL Intravenous Given 10/7/21 0413)   sodium chloride 0.9 % bolus 1,000 mL (0 mL Intravenous Stopped 10/7/21 0139)   cefTRIAXone (ROCEPHIN) IVPB 1 g/50ml dextrose (premix) (0 g Intravenous Stopped 10/6/21 2002)   sodium chloride 0.9 % bolus 1,000 mL (0 mL Intravenous Stopped 10/7/21 0414)   insulin regular (humuLIN R,novoLIN R) injection 10 Units (10 Units Intravenous Given 10/6/21 2046)   calcium gluconate injection 1 g (1 g Intravenous Given 10/6/21 2047)   sodium polystyrene (KAYEXALATE) 15 GM/60ML suspension 30 g (30 g Oral Given 10/6/21 2052)   insulin regular (humuLIN R,novoLIN R) injection 10 Units (10 Units Intravenous Given 10/6/21 2221)   calcium gluconate injection 1 g (1 g Intravenous Given 10/6/21 2221)   albuterol (PROVENTIL) nebulizer solution 0.083% 2.5 mg/3mL (2.5 mg Nebulization Given 10/6/21 9672)   sodium chloride 0.9 % bolus 1,000 mL (0 mL  Intravenous Stopped 10/7/21 0414)   insulin regular (humuLIN R,novoLIN R) injection 10 Units (10 Units Intravenous Given 10/7/21 0048)   albuterol (PROVENTIL) nebulizer solution 0.083% 2.5 mg/3mL (2.5 mg Nebulization Given 10/7/21 0000)   sodium bicarbonate injection 8.4% 100 mEq (100 mEq Intravenous Given 10/7/21 0208)   sodium polystyrene (KAYEXALATE) 15 GM/60ML suspension 15 g (15 g Oral Given 10/7/21 0413)   insulin regular (humuLIN R,novoLIN R) injection 10 Units (10 Units Intravenous Given 10/7/21 0413)     Vital Signs:  Temp:  [97.6 °F (36.4 °C)] 97.6 °F (36.4 °C)  Heart Rate:  [53-81] 65  Resp:  [18-20] 20  BP: (110-139)/() 118/70        10/06/21  1744   Weight: 77.1 kg (170 lb)     Body mass index is 29.18 kg/m².    Physical Exam:     General Appearance:  Alert and cooperative.    Head:  Atraumatic and normocephalic.   Eyes: Conjunctivae and sclerae normal, no icterus. No pallor.   Ears:  Ears with no abnormalities noted.   Throat: No oral lesions, no thrush, oral mucosa moist.   Neck: Supple, trachea midline, no thyromegaly.   Back:   No kyphoscoliosis present. No tenderness to palpation.   Lungs:   Breath sounds heard bilaterally equally.  No crackles or wheezing. No Pleural rub or bronchial breathing.   Heart:  Normal S1 and S2, no murmur, no gallop, no rub. No JVD.   Abdomen:   Normal bowel sounds, no masses, no organomegaly. Soft, nontender, nondistended, no rebound tenderness.   Extremities: Supple, no edema, no cyanosis, no clubbing.   Pulses: Pulses palpable bilaterally.   Skin: No bleeding or rash.   Neurologic: Alert and oriented x 3. No facial asymmetry. Moves all four limbs. No tremors.      Laboratory data:    I have reviewed the labs done in the emergency room.    Results from last 7 days   Lab Units 10/07/21  0551 10/07/21  0217 10/06/21  2354 10/06/21  2202 10/06/21  1854   SODIUM mmol/L 142 145 136   < > 130*   POTASSIUM mmol/L 5.2 6.2* 6.5*   < > 8.6*   CHLORIDE mmol/L 114* 113* 114*    < > 109*   CO2 mmol/L 14.7* 19.1* 9.3*   < > 10.7*   BUN mg/dL 80* 92* 92*   < > 98*   CREATININE mg/dL 4.32* 4.80* 4.99*   < > 5.62*   CALCIUM mg/dL 9.2 10.0 10.4   < > 9.8   BILIRUBIN mg/dL  --   --   --   --  0.2   ALK PHOS U/L  --   --   --   --  67   ALT (SGPT) U/L  --   --   --   --  15   AST (SGOT) U/L  --   --   --   --  18   GLUCOSE mg/dL 203* 73 99   < > 188*    < > = values in this interval not displayed.     Results from last 7 days   Lab Units 10/06/21  1813   WBC 10*3/mm3 13.76*   HEMOGLOBIN g/dL 13.7   HEMATOCRIT % 40.9   PLATELETS 10*3/mm3 247         Results from last 7 days   Lab Units 10/06/21  1854   TROPONIN T ng/mL <0.010                 Results from last 7 days   Lab Units 10/07/21  0556   PH, ARTERIAL pH units 7.355   PO2 ART mm Hg 107.0*   PCO2, ARTERIAL mm Hg 24.6*   HCO3 ART mmol/L 13.7*     Results from last 7 days   Lab Units 10/06/21  1813   COLOR UA  Yellow   GLUCOSE UA  Negative   KETONES UA  Negative   LEUKOCYTES UA  Small (1+)*   PH, URINE  <=5.0   BILIRUBIN UA  Negative   UROBILINOGEN UA  0.2 E.U./dL   RBC UA /HPF None Seen   WBC UA /HPF 21-30*       Pain Management Panel    There is no flowsheet data to display.         Radiology:    No radiology results for the last 3 days    Assessment:  1.  Acute hyperkalemia, present on admission, likely due to Bactrim  2.  Acute renal failure, likely combination of Bactrim, dehydration and lack of autoregulation in the setting of chronic ACE inhibitor use  3.  Non-anion gap metabolic acidosis  4.  Essential hypertension  5.  Type 2 diabetes mellitus  6.  Hypothyroidism  7.  GERD    Plan:  Patient will be admitted to the intensive care unit.  Potassium level is trending down in the right direction and so far she has not had any significant EKG changes. Medication review shows that she is on lisinopril, spironolactone and hydrochlorothiazide which could be contributing to acute renal failure and hyperkalemia.  We will continue with normal  saline at 100 cc/h.  We will stop sodium bicarbonate drip.  Follow-up repeat potassium level this a.m.  Will consult nephrology.  Will order renal ultrasound.  Avoid nephrotoxic agents.  We will continue with ceftriaxone 1 g daily as her UA is still suggestive of an UTI.  Follow-up urine culture.  Follow-up A1c.  Will institute insulin sliding scale.  Resumed all other home meds as appropriate.    Risk Assessment: High  DVT Prophylaxis: Lovenox  Code Status: Full  Diet:     Advance Care Planning   ACP discussion was held with the patient during this visit. Patient does not have an advance directive, information provided.      Estephanie Nixon MD  10/07/21  06:36 EDT    Dictated utilizing Dragon dictation.    Electronically signed by Estephanie Nixon MD at 10/07/21 0636          Emergency Department Notes      Poppy Ferris RN at 10/06/21 1915        Report given to Poppy Pete RN, RN  10/06/21 1924      Electronically signed by Poppy Ferris RN at 10/06/21 1924     Lisa Hartmann at 10/06/21 2011        Patient will be going to ICU 8. Viki TAMEZ notified.      Lisa Hartmann  10/06/21 2011      Electronically signed by Lisa Hartmann at 10/06/21 2011     Viki Chavez RN at 10/06/21 2356        Contacted MD regarding new med orders, 2nd BMP after 2nd round of K+ treatment has not resulted. Per MD hold 3rd treatment until BMP results     Viki Chavez RN  10/06/21 2357      Electronically signed by Viki Chavez RN at 10/06/21 2357     Viki Chavez RN at 10/07/21 0221        Pt up to bsc. Changed bedding     Viki Chavez RN  10/07/21 0222      Electronically signed by Viki Chavez RN at 10/07/21 0222     Viki Chavez RN at 10/07/21 0417        FSBG -81     Viki hCavez RN  10/07/21 0417      Electronically signed by Viki Chavez RN at 10/07/21 0417     Lisa Hartmann at 10/07/21 0625        Patient will be going to [facility] 5. Viki TAMEZ notified.              Mary Kate Lisa MATAMOROS  10/07/21 0643       Mary Kate Lisa MATAMOROS  10/07/21 0645      Electronically signed by Lisa Hartmann at 10/07/21 0645     Viki Chavez, RN at 10/07/21 0632        Pt being held in the ED until shift change.     Viki Chavez, DASHAWN  10/07/21 0633      Electronically signed by Viki Chavez RN at 10/07/21 0633         Facility-Administered Medications as of 10/7/2021   Medication Dose Route Frequency Provider Last Rate Last Admin   • acetaminophen (TYLENOL) tablet 650 mg  650 mg Oral Q4H PRN Estephanie Nixon MD        Or   • acetaminophen (TYLENOL) 160 MG/5ML solution 650 mg  650 mg Oral Q4H PRN Estephanie Nixon MD        Or   • acetaminophen (TYLENOL) suppository 650 mg  650 mg Rectal Q4H PRN Estephanie Nixon MD       • [COMPLETED] albuterol (PROVENTIL) nebulizer solution 0.083% 2.5 mg/3mL  2.5 mg Nebulization Once Clifford, Jena Carmen, DO   2.5 mg at 10/06/21 2217   • [COMPLETED] albuterol (PROVENTIL) nebulizer solution 0.083% 2.5 mg/3mL  2.5 mg Nebulization Once Clifford, Jena Carmen, DO   2.5 mg at 10/07/21 0000   • aspirin EC tablet 81 mg  81 mg Oral Daily Estephanie Nixon MD   81 mg at 10/07/21 0929   • [COMPLETED] calcium gluconate injection 1 g  1 g Intravenous Once Clifford, Jena Carmen, DO   1 g at 10/06/21 2047   • [COMPLETED] calcium gluconate injection 1 g  1 g Intravenous Once Clifford, Jena Carmen, DO   1 g at 10/06/21 2221   • [COMPLETED] cefTRIAXone (ROCEPHIN) IVPB 1 g/50ml dextrose (premix)  1 g Intravenous Once Clifford, Jena Carmen, DO   Stopped at 10/06/21 2002   • cefTRIAXone (ROCEPHIN) IVPB 1 g/50ml dextrose (premix)  1 g Intravenous Q24H Estephanie Nixon MD       • dextrose (D50W) 25 g/ 50mL Intravenous Solution 25 g  25 g Intravenous Q15 Min PRN Estephanie Nixon MD       • dextrose (D50W) 25 g/ 50mL Intravenous Solution 50 mL  50 mL Intravenous Q1H PRN Estephanie Nixon MD   50 mL at 10/06/21 2046   • dextrose (D50W) 25 g/ 50mL Intravenous Solution 50 mL  50 mL  Intravenous Q1H PRN Estephanie Nixon MD   50 mL at 10/06/21 2221   • dextrose (D50W) 25 g/ 50mL Intravenous Solution 50 mL  50 mL Intravenous Q1H PRN Estephanie Nixon MD   50 mL at 10/07/21 0046   • dextrose (D50W) 25 g/ 50mL Intravenous Solution 50 mL  50 mL Intravenous Q1H PRN Estephanie Nixon MD   50 mL at 10/07/21 0413   • dextrose (GLUTOSE) oral gel 1 tube  1 tube Oral Q15 Min PRN Estephanie Nixon MD       • enoxaparin (LOVENOX) syringe 30 mg  30 mg Subcutaneous Nightly Estephanie Nixon MD       • glucagon (human recombinant) (GLUCAGEN DIAGNOSTIC) injection 1 mg  1 mg Subcutaneous PRN Estephanie Nixon MD       • insulin aspart (novoLOG) injection 0-7 Units  0-7 Units Subcutaneous TID AC Estephanie Nixon MD   3 Units at 10/07/21 0929   • [COMPLETED] insulin regular (humuLIN R,novoLIN R) injection 10 Units  10 Units Intravenous Once [facility], DO   10 Units at 10/06/21 2046   • [COMPLETED] insulin regular (humuLIN R,novoLIN R) injection 10 Units  10 Units Intravenous Once [facility], DO   10 Units at 10/06/21 2221   • [COMPLETED] insulin regular (humuLIN R,novoLIN R) injection 10 Units  10 Units Intravenous Once [facility], DO   10 Units at 10/07/21 0048   • [COMPLETED] insulin regular (humuLIN R,novoLIN R) injection 10 Units  10 Units Intravenous Once [facility], DO   10 Units at 10/07/21 0413   • levothyroxine (SYNTHROID, LEVOTHROID) tablet 50 mcg  50 mcg Oral Q AM Estephanie Nixon MD   50 mcg at 10/07/21 0929   • melatonin tablet 5 mg  5 mg Oral Nightly PRN Estephanie Nixon MD       • metoprolol succinate XL (TOPROL-XL) 24 hr tablet 100 mg  100 mg Oral Daily Estephanie Nixon MD   100 mg at 10/07/21 0928   • ondansetron (ZOFRAN) injection 4 mg  4 mg Intravenous Q6H PRN Estephanie Nixon MD       • [COMPLETED] sodium bicarbonate injection 8.4% 100 mEq  100 mEq Intravenous Once Jena Horton,    100 mEq at 10/07/21 0208   • sodium  bicarbonate tablet 1,300 mg  1,300 mg Oral BID Tres Estrella MD, FASN   1,300 mg at 10/07/21 0945   • [COMPLETED] sodium chloride 0.9 % bolus 1,000 mL  1,000 mL Intravenous Once Clifford, Jena Carmen, DO   Stopped at 10/07/21 0139   • [COMPLETED] sodium chloride 0.9 % bolus 1,000 mL  1,000 mL Intravenous Once Clifford, Jena Carmen, DO   Stopped at 10/07/21 0414   • [COMPLETED] sodium chloride 0.9 % bolus 1,000 mL  1,000 mL Intravenous Once Clifford, Jena Carmen, DO   Stopped at 10/07/21 0414   • sodium chloride 0.9 % flush 10 mL  10 mL Intravenous PRN Estephanie Nixon MD       • sodium chloride 0.9 % flush 3 mL  3 mL Intravenous Q12H Estephanie Nixon MD   3 mL at 10/07/21 0945   • sodium chloride 0.9 % flush 3-10 mL  3-10 mL Intravenous PRN Estephanie Nixon MD       • sodium chloride 0.9 % infusion  100 mL/hr Intravenous Continuous Estephanie Nixon  mL/hr at 10/07/21 0928 100 mL/hr at 10/07/21 0928   • [COMPLETED] sodium polystyrene (KAYEXALATE) 15 GM/60ML suspension 15 g  15 g Oral Once Clifford, Jena Carmen, DO   15 g at 10/07/21 0413   • [COMPLETED] sodium polystyrene (KAYEXALATE) 15 GM/60ML suspension 30 g  30 g Oral Once Clifford, Jena Carmen, DO   30 g at 10/06/21 2052       Lab Results (last 24 hours)     Procedure Component Value Units Date/Time    POC Glucose Once [[id number]]  (Abnormal) Collected: 10/07/21 0637    Specimen: Blood Updated: 10/07/21 1012     Glucose 138 mg/dL      Comment: Serial Number: [id number]:  [id number]       Hemoglobin A1c [[id number]]  (Abnormal) Collected: 10/06/21 1813    Specimen: Blood Updated: 10/07/21 0747     Hemoglobin A1C 6.40 %     Narrative:      Hemoglobin A1C Ranges:    Increased Risk for Diabetes  5.7% to 6.4%  Diabetes                     >= 6.5%  Diabetic Goal                < 7.0%    Basic Metabolic Panel [[id number]]  (Abnormal) Collected: 10/07/21 0551    Specimen: Blood Updated: 10/07/21 0636     Glucose 203 mg/dL      BUN 80 mg/dL       Creatinine 4.32 mg/dL      Sodium 142 mmol/L      Potassium 5.2 mmol/L      Comment: Slight hemolysis detected by analyzer. Results may be affected.        Chloride 114 mmol/L      CO2 14.7 mmol/L      Calcium 9.2 mg/dL      eGFR   Amer --     Comment: <15 Indicative of kidney failure.        eGFR Non African Amer 10 mL/min/1.73      Comment: <15 Indicative of kidney failure.        BUN/Creatinine Ratio 18.5     Anion Gap 13.3 mmol/L     Narrative:      GFR Normal >60  Chronic Kidney Disease <60  Kidney Failure <15      Blood Gas, Arterial With Co-Ox [[id number]]  (Abnormal) Collected: 10/07/21 0556    Specimen: Arterial Blood Updated: 10/07/21 0556     Site Right Brachial     Francisco's Test N/A     pH, Arterial 7.355 pH units      pCO2, Arterial 24.6 mm Hg      Comment: 84 Value below reference range        pO2, Arterial 107.0 mm Hg      HCO3, Arterial 13.7 mmol/L      Comment: 84 Value below reference range        Base Excess, Arterial -10.2 mmol/L      Comment: 84 Value below reference range        O2 Saturation, Arterial 98.9 %      Hematocrit, Blood Gas 34.3 %      Comment: 84 Value below reference range        Oxyhemoglobin 97.5 %      Methemoglobin 0.80 %      Carboxyhemoglobin <0.7 %      Comment: 94 Value below reportable range < 0.7        A-a Gradiant 10.9 mmHg      Barometric Pressure for Blood Gas 738 mmHg      Modality N/A     FIO2 21 %      Ventilator Mode NA     Note --     Collected by johnathon     Comment: Meter: [id number]     :  [id number]        pH, Temp Corrected --     pCO2, Temperature Corrected --     pO2, Temperature Corrected --    POC Glucose Once [[id number]]  (Normal) Collected: 10/07/21 0412    Specimen: Blood Updated: 10/07/21 0420     Glucose 81 mg/dL      Comment: Serial Number: [id number]:  [id number]       Basic Metabolic Panel [[id number]]  (Abnormal) Collected: 10/07/21 0217    Specimen: Blood Updated: 10/07/21 0248     Glucose 73 mg/dL      BUN 92 mg/dL       Creatinine 4.80 mg/dL      Sodium 145 mmol/L      Potassium 6.2 mmol/L      Comment: Slight hemolysis detected by analyzer. Results may be affected.        Chloride 113 mmol/L      CO2 19.1 mmol/L      Calcium 10.0 mg/dL      eGFR   Amer --     Comment: <15 Indicative of kidney failure.        eGFR Non African Amer 9 mL/min/1.73      Comment: <15 Indicative of kidney failure.        BUN/Creatinine Ratio 19.2     Anion Gap 12.9 mmol/L     Narrative:      GFR Normal >60  Chronic Kidney Disease <60  Kidney Failure <15      Blood Gas, Arterial With Co-Ox [[id number]]  (Abnormal) Collected: 10/07/21 0154    Specimen: Arterial Blood Updated: 10/07/21 0154     Site Right Brachial     Francisco's Test N/A     pH, Arterial 7.244 pH units      Comment: 84 Value below reference range        pCO2, Arterial 21.8 mm Hg      Comment: 84 Value below reference range        pO2, Arterial 111.0 mm Hg      Comment: 83 Value above reference range        HCO3, Arterial 9.4 mmol/L      Comment: 84 Value below reference range        Base Excess, Arterial -16.0 mmol/L      Comment: 84 Value below reference range        O2 Saturation, Arterial 98.7 %      Hematocrit, Blood Gas 36.7 %      Comment: 84 Value below reference range        Oxyhemoglobin 97.3 %      Methemoglobin 0.90 %      Carboxyhemoglobin <0.7 %      Comment: 94 Value below reportable range < 0.7        A-a Gradiant 9.9 mmHg      Barometric Pressure for Blood Gas 739 mmHg      Modality N/A     FIO2 21 %      Ventilator Mode NA     Note --     Collected by      Comment: Meter: [id number]     :  [id number]        pH, Temp Corrected --     pCO2, Temperature Corrected --     pO2, Temperature Corrected --    Basic Metabolic Panel [[id number]]  (Abnormal) Collected: 10/06/21 6474    Specimen: Blood Updated: 10/07/21 0021     Glucose 99 mg/dL      BUN 92 mg/dL      Creatinine 4.99 mg/dL      Sodium 136 mmol/L      Potassium 6.5 mmol/L      Comment: Slight hemolysis  detected by analyzer. Results may be affected.        Chloride 114 mmol/L      CO2 9.3 mmol/L      Calcium 10.4 mg/dL      eGFR   Amer --     Comment: <15 Indicative of kidney failure.        eGFR Non African Amer 9 mL/min/1.73      Comment: <15 Indicative of kidney failure.        BUN/Creatinine Ratio 18.4     Anion Gap 12.7 mmol/L     Narrative:      GFR Normal >60  Chronic Kidney Disease <60  Kidney Failure <15      Basic Metabolic Panel [[id number]]  (Abnormal) Collected: 10/06/21 2202    Specimen: Blood Updated: 10/06/21 2300     Glucose 106 mg/dL      BUN 94 mg/dL      Creatinine 5.13 mg/dL      Sodium 136 mmol/L      Potassium 6.6 mmol/L      Comment: Slight hemolysis detected by analyzer. Results may be affected.        Chloride 114 mmol/L      CO2 11.4 mmol/L      Calcium 9.8 mg/dL      eGFR   Amer --     Comment: <15 Indicative of kidney failure.        eGFR Non African Amer 8 mL/min/1.73      Comment: <15 Indicative of kidney failure.        BUN/Creatinine Ratio 18.3     Anion Gap 10.6 mmol/L     Narrative:      GFR Normal >60  Chronic Kidney Disease <60  Kidney Failure <15      COVID-19,[facility] IN-HOUSE,Nasal Swab No Transport Media 3-4 HR TAT - Swab, Nasal Cavity [[id number]]  (Normal) Collected: 10/06/21 2038    Specimen: Swab from Nasal Cavity Updated: 10/06/21 2110     COVID19 Not Detected    Narrative:      Fact sheet for providers: https://www.fda.gov/media/709539/download     Fact sheet for patients: https://www.fda.gov/media/024116/download    Test performed by PCR.    Consider negative results in combination with clinical observations, patient history, and epidemiological information.    Comprehensive Metabolic Panel [[id number]]  (Abnormal) Collected: 10/06/21 1854    Specimen: Blood Updated: 10/06/21 1942     Glucose 188 mg/dL      Comment: Glucose >180, Hemoglobin A1C recommended.        BUN 98 mg/dL      Creatinine 5.62 mg/dL      Sodium 130 mmol/L      Potassium 8.6 mmol/L       Chloride 109 mmol/L      CO2 10.7 mmol/L      Calcium 9.8 mg/dL      Total Protein 7.8 g/dL      Albumin 4.50 g/dL      ALT (SGPT) 15 U/L      AST (SGOT) 18 U/L      Alkaline Phosphatase 67 U/L      Total Bilirubin 0.2 mg/dL      eGFR Non African Amer 8 mL/min/1.73      Comment: <15 Indicative of kidney failure.        eGFR   Amer --     Comment: <15 Indicative of kidney failure.        Globulin 3.3 gm/dL      A/G Ratio 1.4 g/dL      BUN/Creatinine Ratio 17.4     Anion Gap 10.3 mmol/L     Narrative:      GFR Normal >60  Chronic Kidney Disease <60  Kidney Failure <15      Troponin [[id number]]  (Normal) Collected: 10/06/21 1854    Specimen: Blood Updated: 10/06/21 1933     Troponin T <0.010 ng/mL     Narrative:      Troponin T Reference Range:  <= 0.03 ng/mL-   Negative for AMI  >0.03 ng/mL-     Abnormal for myocardial necrosis.  Clinicians would have to utilize clinical acumen, EKG, Troponin and serial changes to determine if it is an Acute Myocardial Infarction or myocardial injury due to an underlying chronic condition.       Results may be falsely decreased if patient taking Biotin.      Magnesium [[id number]]  (Normal) Collected: 10/06/21 1854    Specimen: Blood Updated: 10/06/21 1931     Magnesium 2.3 mg/dL     Urinalysis, Microscopic Only - Urine, Clean Catch [[id number]]  (Abnormal) Collected: 10/06/21 1813    Specimen: Urine, Clean Catch Updated: 10/06/21 1836     RBC, UA None Seen /HPF      WBC, UA 21-30 /HPF      Bacteria, UA 1+ /HPF      Squamous Epithelial Cells, UA 0-2 /HPF      Hyaline Casts, UA None Seen /LPF      Methodology Manual Light Microscopy    Urinalysis With Culture If Indicated - Urine, Clean Catch [[id number]]  (Abnormal) Collected: 10/06/21 1813    Specimen: Urine, Clean Catch Updated: 10/06/21 1836     Color, UA Yellow     Appearance, UA Clear     pH, UA <=5.0     Specific Gravity, UA 1.015     Glucose, UA Negative     Ketones, UA Negative     Bilirubin, UA Negative     Blood, UA  Negative     Protein, UA Negative     Leuk Esterase, UA Small (1+)     Nitrite, UA Negative     Urobilinogen, UA 0.2 E.U./dL    Urine Culture - Urine, Urine, Clean Catch [[id number]] Collected: 10/06/21 1813    Specimen: Urine, Clean Catch Updated: 10/06/21 1836    CBC & Differential [[id number]]  (Abnormal) Collected: 10/06/21 1813    Specimen: Blood Updated: 10/06/21 1819    Narrative:      The following orders were created for panel order CBC & Differential.  Procedure                               Abnormality         Status                     ---------                               -----------         ------                     CBC Auto Differential[[id number]]        Abnormal            Final result                 Please view results for these tests on the individual orders.    CBC Auto Differential [[id number]]  (Abnormal) Collected: 10/06/21 1813    Specimen: Blood Updated: 10/06/21 1819     WBC 13.76 10*3/mm3      RBC 4.41 10*6/mm3      Hemoglobin 13.7 g/dL      Hematocrit 40.9 %      MCV 92.7 fL      MCH 31.1 pg      MCHC 33.5 g/dL      RDW 13.3 %      RDW-SD 45.3 fl      MPV 11.3 fL      Platelets 247 10*3/mm3      Neutrophil % 81.9 %      Lymphocyte % 12.3 %      Monocyte % 4.3 %      Eosinophil % 0.8 %      Basophil % 0.3 %      Immature Grans % 0.4 %      Neutrophils, Absolute 11.28 10*3/mm3      Lymphocytes, Absolute 1.69 10*3/mm3      Monocytes, Absolute 0.59 10*3/mm3      Eosinophils, Absolute 0.11 10*3/mm3      Basophils, Absolute 0.04 10*3/mm3      Immature Grans, Absolute 0.05 10*3/mm3      nRBC 0.0 /100 WBC         Imaging Results (Last 24 Hours)     Procedure Component Value Units Date/Time    XR Chest 1 View [[id number]] Collected: 10/07/21 0751     Updated: 10/07/21 0753    Narrative:      PROCEDURE: XR CHEST 1 VW-     HISTORY: Weak/Dizzy/AMS triage protocol     COMPARISON: None.     FINDINGS: The heart is normal in size. The mediastinum is unremarkable.  The lungs are clear. There is no  pneumothorax.  There are no acute  osseous abnormalities.       Impression:      No acute cardiopulmonary process.     Continued followup is recommended.     This report was finalized on 10/7/2021 7:51 AM by Jadiel Deng M.D..

## 2021-10-07 NOTE — PROGRESS NOTES
After speaking to nephrology and ER physician, it was decided that the patient would be observed and kept in the ER until there is a significant downtrend of her potassium . If no improvement in K after treatment, then patient will need temporary HD catheter prior to admission for urgent HD.

## 2021-10-08 LAB
ALBUMIN SERPL-MCNC: 3.7 G/DL (ref 3.5–5.2)
ALBUMIN/GLOB SERPL: 1.5 G/DL
ALP SERPL-CCNC: 60 U/L (ref 39–117)
ALT SERPL W P-5'-P-CCNC: 14 U/L (ref 1–33)
ANION GAP SERPL CALCULATED.3IONS-SCNC: 10.5 MMOL/L (ref 5–15)
AST SERPL-CCNC: 19 U/L (ref 1–32)
BACTERIA SPEC AEROBE CULT: NORMAL
BASOPHILS # BLD AUTO: 0.05 10*3/MM3 (ref 0–0.2)
BASOPHILS NFR BLD AUTO: 0.6 % (ref 0–1.5)
BILIRUB SERPL-MCNC: 0.2 MG/DL (ref 0–1.2)
BUN SERPL-MCNC: 59 MG/DL (ref 8–23)
BUN/CREAT SERPL: 17.5 (ref 7–25)
CALCIUM SPEC-SCNC: 8.2 MG/DL (ref 8.6–10.5)
CHLORIDE SERPL-SCNC: 115 MMOL/L (ref 98–107)
CO2 SERPL-SCNC: 15.5 MMOL/L (ref 22–29)
CREAT SERPL-MCNC: 3.37 MG/DL (ref 0.57–1)
DEPRECATED RDW RBC AUTO: 44.6 FL (ref 37–54)
EOSINOPHIL # BLD AUTO: 0.33 10*3/MM3 (ref 0–0.4)
EOSINOPHIL NFR BLD AUTO: 3.9 % (ref 0.3–6.2)
ERYTHROCYTE [DISTWIDTH] IN BLOOD BY AUTOMATED COUNT: 13.2 % (ref 12.3–15.4)
GFR SERPL CREATININE-BSD FRML MDRD: 14 ML/MIN/1.73
GFR SERPL CREATININE-BSD FRML MDRD: ABNORMAL ML/MIN/{1.73_M2}
GLOBULIN UR ELPH-MCNC: 2.5 GM/DL
GLUCOSE BLDC GLUCOMTR-MCNC: 125 MG/DL (ref 70–130)
GLUCOSE BLDC GLUCOMTR-MCNC: 162 MG/DL (ref 70–130)
GLUCOSE BLDC GLUCOMTR-MCNC: 174 MG/DL (ref 70–130)
GLUCOSE BLDC GLUCOMTR-MCNC: 192 MG/DL (ref 70–130)
GLUCOSE SERPL-MCNC: 135 MG/DL (ref 65–99)
HCT VFR BLD AUTO: 31.6 % (ref 34–46.6)
HGB BLD-MCNC: 10.5 G/DL (ref 12–15.9)
IMM GRANULOCYTES # BLD AUTO: 0.02 10*3/MM3 (ref 0–0.05)
IMM GRANULOCYTES NFR BLD AUTO: 0.2 % (ref 0–0.5)
LYMPHOCYTES # BLD AUTO: 2.95 10*3/MM3 (ref 0.7–3.1)
LYMPHOCYTES NFR BLD AUTO: 35.1 % (ref 19.6–45.3)
MCH RBC QN AUTO: 30.3 PG (ref 26.6–33)
MCHC RBC AUTO-ENTMCNC: 33.2 G/DL (ref 31.5–35.7)
MCV RBC AUTO: 91.3 FL (ref 79–97)
MONOCYTES # BLD AUTO: 0.57 10*3/MM3 (ref 0.1–0.9)
MONOCYTES NFR BLD AUTO: 6.8 % (ref 5–12)
NEUTROPHILS NFR BLD AUTO: 4.48 10*3/MM3 (ref 1.7–7)
NEUTROPHILS NFR BLD AUTO: 53.4 % (ref 42.7–76)
NRBC BLD AUTO-RTO: 0 /100 WBC (ref 0–0.2)
PLATELET # BLD AUTO: 163 10*3/MM3 (ref 140–450)
PMV BLD AUTO: 11.3 FL (ref 6–12)
POTASSIUM SERPL-SCNC: 5.2 MMOL/L (ref 3.5–5.2)
PROT SERPL-MCNC: 6.2 G/DL (ref 6–8.5)
RBC # BLD AUTO: 3.46 10*6/MM3 (ref 3.77–5.28)
SODIUM SERPL-SCNC: 141 MMOL/L (ref 136–145)
WBC # BLD AUTO: 8.4 10*3/MM3 (ref 3.4–10.8)

## 2021-10-08 PROCEDURE — 94799 UNLISTED PULMONARY SVC/PX: CPT

## 2021-10-08 PROCEDURE — 25010000002 ENOXAPARIN PER 10 MG: Performed by: INTERNAL MEDICINE

## 2021-10-08 PROCEDURE — 85025 COMPLETE CBC W/AUTO DIFF WBC: CPT | Performed by: NURSE PRACTITIONER

## 2021-10-08 PROCEDURE — 99232 SBSQ HOSP IP/OBS MODERATE 35: CPT | Performed by: NURSE PRACTITIONER

## 2021-10-08 PROCEDURE — 80053 COMPREHEN METABOLIC PANEL: CPT | Performed by: NURSE PRACTITIONER

## 2021-10-08 PROCEDURE — 82962 GLUCOSE BLOOD TEST: CPT

## 2021-10-08 PROCEDURE — 63710000001 INSULIN ASPART PER 5 UNITS: Performed by: INTERNAL MEDICINE

## 2021-10-08 RX ADMIN — INSULIN ASPART 2 UNITS: 100 INJECTION, SOLUTION INTRAVENOUS; SUBCUTANEOUS at 17:29

## 2021-10-08 RX ADMIN — SODIUM CHLORIDE, PRESERVATIVE FREE 10 ML: 5 INJECTION INTRAVENOUS at 09:38

## 2021-10-08 RX ADMIN — SODIUM CHLORIDE 100 ML/HR: 9 INJECTION, SOLUTION INTRAVENOUS at 02:03

## 2021-10-08 RX ADMIN — METOPROLOL SUCCINATE 100 MG: 100 TABLET, EXTENDED RELEASE ORAL at 09:38

## 2021-10-08 RX ADMIN — LEVOTHYROXINE SODIUM 50 MCG: 50 TABLET ORAL at 05:33

## 2021-10-08 RX ADMIN — SODIUM BICARBONATE 650 MG TABLET 1300 MG: at 20:09

## 2021-10-08 RX ADMIN — SODIUM BICARBONATE 650 MG TABLET 1300 MG: at 09:38

## 2021-10-08 RX ADMIN — ENOXAPARIN SODIUM 30 MG: 30 INJECTION SUBCUTANEOUS at 20:08

## 2021-10-08 RX ADMIN — SODIUM CHLORIDE 100 ML/HR: 9 INJECTION, SOLUTION INTRAVENOUS at 13:55

## 2021-10-08 RX ADMIN — SODIUM CHLORIDE, PRESERVATIVE FREE 3 ML: 5 INJECTION INTRAVENOUS at 20:08

## 2021-10-08 RX ADMIN — ASPIRIN 81 MG: 81 TABLET, COATED ORAL at 09:38

## 2021-10-08 NOTE — PROGRESS NOTES
[facility]    Name:  Lizeth Bhardwaj   Age:  66 y.o.  Sex:  female  :  1955  MRN:  [id number]   Visit Number:  [id number]  Admission Date:  10/6/2021  Date Of Service:  10/08/21  Primary Care Physician:  Sebastian Norman MD     LOS: 2 days :    Chief Complaint:      Generalized weakness    Subjective:    Patient seen and evaluated today found resting in bed.  Patient has no complaints on exam, denies any pain today.  States she is feeling much better today.  Has been seen by Dr. Tamayo today and not quite ready for discharge.  Discussed course of care to which patient is agreeable.     Hospital Course:    Patient is a very pleasant 66-year-old female with history of hypertension, type 2 diabetes mellitus, GERD and hypothyroidism who presented to the ER 10/7/2021 with complaints of generalized weakness.  She states that about a week ago she started feeling poorly with fatigue, dysuria and persistent diarrhea which she attributed to Metformin.  She went to see her primary care physician who stopped the Metformin and checked a urinalysis which was suggestive of an UTI.  She was then placed on Bactrim which she has been taking with resolution of the dysuria.  However she has been quite nauseous throughout this whole time and feeling worse in terms of fatigue, malaise and generalized weakness.  Her appetite has been quite poor and she has not been eating or drinking much. She reports no fever but does state that she has had some chills.      Noted to be on lisinopril, spironolactone, hydrochlorothiazide which could be contributing to her current symptoms.  Labs had significant abnormalities on admission including:  BUN/creatinine 98/5.62 with no known baseline, sodium 130, potassium 8.6 and a nonhemolyzed sample, serum bicarb 10, WBC 13.7.  Normal magnesium.  pH was 7.24.  She was given 4 rounds of insulin/dextrose 50, multiple rounds of albuterol, 2 rounds of  calcium gluconate, 2 doses of Kayexalate and 3 L of IV fluid.  She was also given 2 A of sodium bicarb and placed on a bicarb drip.     Admitted to the hospital after multiple rounds of potassium treatment with potassium stabilizing at 5.2.  Nephrology was consulted and bicarb drip was stopped with patient transitioning to oral bicarb.  She was placed on Rocephin for UTI.      Review of Systems:     All systems were reviewed and negative except as mentioned in subjective, assessment and plan.    Vital Signs:    Temp:  [97.2 °F (36.2 °C)-98.8 °F (37.1 °C)] 98.8 °F (37.1 °C)  Heart Rate:  [59-74] 59  Resp:  [16-20] 16  BP: (128-157)/(53-65) 134/53    Intake and output:    I/O last 3 completed shifts:  In: 6730 [P.O.:1310; I.V.:2320; IV Piggyback:3100]  Out: -   I/O this shift:  In: 480 [P.O.:480]  Out: -     Physical Examination:    General Appearance:  Alert and cooperative, pleasant middle aged female, in no acute distress on exam   Head:  Atraumatic and normocephalic.   Eyes: Conjunctivae and sclerae normal, no icterus. No pallor.   Throat: No oral lesions, no thrush, oral mucosa moist.   Neck: Supple, trachea midline   Lungs:   Breath sounds heard bilaterally equally.  No crackles or wheezing.    Heart:  Normal S1 and S2, no murmur, no gallop, no rub. No JVD.   Abdomen:   Normal bowel sounds, no masses, no organomegaly. Soft, nontender, nondistended, no rebound tenderness.   Extremities: Supple, no edema, no cyanosis, no clubbing.   Skin: No bleeding or rash.   Neurologic: Alert and oriented x 3. No facial asymmetry. Moves all four limbs.     Laboratory results:    Results from last 7 days   Lab Units 10/08/21  0539 10/07/21  0551 10/07/21  0217 10/06/21  2202 10/06/21  1854   SODIUM mmol/L 141 142 145   < > 130*   POTASSIUM mmol/L 5.2 5.2 6.2*   < > 8.6*   CHLORIDE mmol/L 115* 114* 113*   < > 109*   CO2 mmol/L 15.5* 14.7* 19.1*   < > 10.7*   BUN mg/dL 59* 80* 92*   < > 98*   CREATININE mg/dL 3.37* 4.32* 4.80*   <  > 5.62*   CALCIUM mg/dL 8.2* 9.2 10.0   < > 9.8   BILIRUBIN mg/dL 0.2  --   --   --  0.2   ALK PHOS U/L 60  --   --   --  67   ALT (SGPT) U/L 14  --   --   --  15   AST (SGOT) U/L 19  --   --   --  18   GLUCOSE mg/dL 135* 203* 73   < > 188*    < > = values in this interval not displayed.     Results from last 7 days   Lab Units 10/08/21  0539 10/06/21  1813   WBC 10*3/mm3 8.40 13.76*   HEMOGLOBIN g/dL 10.5* 13.7   HEMATOCRIT % 31.6* 40.9   PLATELETS 10*3/mm3 163 247         Results from last 7 days   Lab Units 10/06/21  1854   TROPONIN T ng/mL <0.010     Results from last 7 days   Lab Units 10/06/21  1813   URINECX  50,000 CFU/mL Mixed Alycia Isolated     Results from last 7 days   Lab Units 10/07/21  0556   PH, ARTERIAL pH units 7.355   PO2 ART mm Hg 107.0*   PCO2, ARTERIAL mm Hg 24.6*   HCO3 ART mmol/L 13.7*     I have reviewed the patient's laboratory results.    Radiology results:    US Renal Limited    Result Date: 10/7/2021  PROCEDURE: US RENAL LIMITED-  HISTORY: acute renal failure; N17.9-Acute kidney failure, unspecified; E87.5-Hyperkalemia  PROCEDURE: Ultrasound images of the kidneys were obtained.  FINDINGS:.  The kidneys are normal in size and echogenicity with the right kidney measuring 10.7 cm and the left kidney measuring 11.6 cm. There is no cystic or solid mass. There is no hydronephrosis.      Impression: Normal renal ultrasound.  This report was finalized on 10/7/2021 3:44 PM by Jadiel Deng M.D..    XR Chest 1 View    Result Date: 10/7/2021  PROCEDURE: XR CHEST 1 VW-  HISTORY: Weak/Dizzy/AMS triage protocol  COMPARISON: None.  FINDINGS: The heart is normal in size. The mediastinum is unremarkable. The lungs are clear. There is no pneumothorax.  There are no acute osseous abnormalities.      Impression: No acute cardiopulmonary process.  Continued followup is recommended.  This report was finalized on 10/7/2021 7:51 AM by Jadiel Deng M.D..    I have reviewed the patient's radiology  reports.    Medication Review:     I have reviewed the patient's active and prn medications.     Problem List:      Acute renal failure (HCC)      Assessment:    1.  Acute hyperkalemia, present on admission, likely due to Bactrim  2.  Acute renal failure, likely combination of Bactrim, dehydration and lack of autoregulation in the setting of chronic ACE inhibitor use  3.  Non-anion gap metabolic acidosis  4.  Essential hypertension  5.  Type 2 diabetes mellitus  6.  Hypothyroidism  7.  GERD    Plan:    Lab work remains stable.  Potassium-5.2 with am labs.  Creatinine is trending down.  Continue to hold nephrotoxic meds.  Appreciate nephrology ([facility]) consulting for patient.  Continue current course of care.  Urine culture growing 50,000 CFU mixed ezequiel.  Continue Rocephin for UTI.  Continue slow IVF for now.  Discharge home when cleared by nephrology probable 1-2 days.  Patient would like to change PCP at discharge to a female if possible.     DVT Prophylaxis: Lovenox  Code Status: Full code  Diet: Cardiac Consistent Carb  Discharge Plan: Home when stable    Karely Allen, NIDIA  10/08/21  13:59 EDT    Dictated utilizing Dragon dictation.

## 2021-10-08 NOTE — PLAN OF CARE
Goal Outcome Evaluation:              Outcome Summary: pt restful throughout sukumar without complaints.  AM labs ordered.  NSR on monitor,

## 2021-10-08 NOTE — PROGRESS NOTES
[facility] Progress Note  [facility]. [place]        Patient Name: Lizeth Bhardwaj  : 1955  MRN: [id number]   LOS: 2 days    Patient Care Team:  Sebastian Norman MD as PCP - General (Internal Medicine)    Chief Complaint:    Chief Complaint   Patient presents with   • Weakness - Generalized   • Urinary Tract Infection     Primary Care Physician:  Sebastian Norman MD  Date of admission: 10/6/2021    Subjective     Interval History:   Events from last 24 hours noted patient is awake alert interactive. She denies having any chest pain shortness of breath no nausea vomiting no abdominal pain, she said she is still full from breakfast does not want to eat lunch.  Review of Systems:   As noted above    Objective     Vitals:   Temp:  [97.4 °F (36.3 °C)-98.4 °F (36.9 °C)] 98.4 °F (36.9 °C)  Heart Rate:  [66-76] 74  Resp:  [16-20] 20  BP: (126-157)/(54-72) 157/61    Intake/Output Summary (Last 24 hours) at 10/8/2021 0641  Last data filed at 10/8/2021 0600  Gross per 24 hour   Intake 3680 ml   Output --   Net 3680 ml       Physical Exam:    General Appearance: alert, oriented x 3, no acute distress   Skin: warm and dry  HEENT: oral mucosa normal, nonicteric sclera  Neck: supple, no JVD  Lungs: CTA  Heart: RRR, normal S1 and S2  Abdomen: soft, nontender, nondistended  : no palpable bladder  Extremities: no edema, cyanosis or clubbing  Neuro: normal speech and mental status     Scheduled Meds:     Current Facility-Administered Medications   Medication Dose Route Frequency Provider Last Rate Last Admin   • acetaminophen (TYLENOL) tablet 650 mg  650 mg Oral Q4H PRN Estephanie Nixon MD        Or   • acetaminophen (TYLENOL) 160 MG/5ML solution 650 mg  650 mg Oral Q4H PRN Estephanie Nixon MD        Or   • acetaminophen (TYLENOL) suppository 650 mg  650 mg Rectal Q4H PRN Estephanie Nixon MD       • aspirin EC tablet 81 mg  81 mg Oral Daily Estephanie Nixon MD   81 mg at  10/07/21 0929   • cefTRIAXone (ROCEPHIN) IVPB 1 g/50ml dextrose (premix)  1 g Intravenous Q24H Estephanie Nixon  mL/hr at 10/07/21 1823 1 g at 10/07/21 1823   • dextrose (D50W) 25 g/ 50mL Intravenous Solution 25 g  25 g Intravenous Q15 Min PRN Estephanie Nixon MD       • dextrose (D50W) 25 g/ 50mL Intravenous Solution 50 mL  50 mL Intravenous Q1H PRN Estephanie Nixon MD   50 mL at 10/06/21 2046   • dextrose (D50W) 25 g/ 50mL Intravenous Solution 50 mL  50 mL Intravenous Q1H PRN Estephanie Nixon MD   50 mL at 10/06/21 2221   • dextrose (D50W) 25 g/ 50mL Intravenous Solution 50 mL  50 mL Intravenous Q1H PRN Estephanie Nixon MD   50 mL at 10/07/21 0046   • dextrose (D50W) 25 g/ 50mL Intravenous Solution 50 mL  50 mL Intravenous Q1H PRN Estephanie Nixon MD   50 mL at 10/07/21 0413   • dextrose (GLUTOSE) oral gel 1 tube  1 tube Oral Q15 Min PRN Estephanie Nixon MD       • enoxaparin (LOVENOX) syringe 30 mg  30 mg Subcutaneous Nightly Estephanie Nixon MD       • glucagon (human recombinant) (GLUCAGEN DIAGNOSTIC) injection 1 mg  1 mg Subcutaneous PRN Estephanie Nixon MD       • insulin aspart (novoLOG) injection 0-7 Units  0-7 Units Subcutaneous TID AC Estephanie Nixon MD   2 Units at 10/07/21 1815   • levothyroxine (SYNTHROID, LEVOTHROID) tablet 50 mcg  50 mcg Oral Q AM Estephanie Nixon MD   50 mcg at 10/08/21 0533   • melatonin tablet 5 mg  5 mg Oral Nightly PRN Estephanie Nixon MD       • metoprolol succinate XL (TOPROL-XL) 24 hr tablet 100 mg  100 mg Oral Daily Estephanie Nixon MD   100 mg at 10/07/21 0928   • ondansetron (ZOFRAN) injection 4 mg  4 mg Intravenous Q6H PRN Estephanie Nixon MD       • sodium bicarbonate tablet 1,300 mg  1,300 mg Oral BID Tres Estrella MD, FASN   1,300 mg at 10/07/21 2033   • sodium chloride 0.9 % flush 10 mL  10 mL Intravenous PRN Estephanie Nixon MD       • sodium chloride 0.9 % flush 3 mL  3 mL Intravenous Q12H Estephanie iNxon MD   3 mL at 10/07/21 2034   •  sodium chloride 0.9 % flush 3-10 mL  3-10 mL Intravenous PRN Estephanie Nixno MD       • sodium chloride 0.9 % infusion  100 mL/hr Intravenous Continuous Estephanie Nixon  mL/hr at 10/08/21 0203 100 mL/hr at 10/08/21 0203       aspirin, 81 mg, Oral, Daily  cefTRIAXone, 1 g, Intravenous, Q24H  enoxaparin, 30 mg, Subcutaneous, Nightly  insulin aspart, 0-7 Units, Subcutaneous, TID AC  levothyroxine, 50 mcg, Oral, Q AM  metoprolol succinate XL, 100 mg, Oral, Daily  sodium bicarbonate, 1,300 mg, Oral, BID  sodium chloride, 3 mL, Intravenous, Q12H        IV Meds:   sodium chloride, 100 mL/hr, Last Rate: 100 mL/hr (10/08/21 0203)        Results Reviewed:   I have personally reviewed the results from the time of this admission to 10/8/2021 06:41 EDT     Results from last 7 days   Lab Units 10/08/21  0539 10/07/21  0551 10/07/21  0217 10/06/21  2202 10/06/21  1854   SODIUM mmol/L 141 142 145   < > 130*   POTASSIUM mmol/L 5.2 5.2 6.2*   < > 8.6*   CHLORIDE mmol/L 115* 114* 113*   < > 109*   CO2 mmol/L 15.5* 14.7* 19.1*   < > 10.7*   BUN mg/dL 59* 80* 92*   < > 98*   CREATININE mg/dL 3.37* 4.32* 4.80*   < > 5.62*   CALCIUM mg/dL 8.2* 9.2 10.0   < > 9.8   BILIRUBIN mg/dL 0.2  --   --   --  0.2   ALK PHOS U/L 60  --   --   --  67   ALT (SGPT) U/L 14  --   --   --  15   AST (SGOT) U/L 19  --   --   --  18   GLUCOSE mg/dL 135* 203* 73   < > 188*    < > = values in this interval not displayed.       Estimated Creatinine Clearance: 16.7 mL/min (A) (by C-G formula based on SCr of 3.37 mg/dL (H)).    Results from last 7 days   Lab Units 10/06/21  1854   MAGNESIUM mg/dL 2.3             Results from last 7 days   Lab Units 10/08/21  0539 10/06/21  1813   WBC 10*3/mm3 8.40 13.76*   HEMOGLOBIN g/dL 10.5* 13.7   PLATELETS 10*3/mm3 163 247             Brief Urine Lab Results  (Last result in the past 365 days)      Color   Clarity   Blood   Leuk Est   Nitrite   Protein   CREAT   Urine HCG        10/06/21 1813 Yellow Clear Negative  Small (1+) Negative Negative               No results found for: UTPCR    Imaging Results (Last 24 Hours)     Procedure Component Value Units Date/Time    US Renal Limited [[id number]] Collected: 10/07/21 1544     Updated: 10/07/21 1547    Narrative:      PROCEDURE: US RENAL LIMITED-     HISTORY: acute renal failure; N17.9-Acute kidney failure, unspecified;  E87.5-Hyperkalemia     PROCEDURE: Ultrasound images of the kidneys were obtained.     FINDINGS:.    The kidneys are normal in size and echogenicity with the right kidney  measuring 10.7 cm and the left kidney measuring 11.6 cm. There is no  cystic or solid mass. There is no hydronephrosis.       Impression:      Normal renal ultrasound.     This report was finalized on 10/7/2021 3:44 PM by Jadiel Deng M.D..              Assessment / Plan     ASSESSMENT:  1. Acute renal failure (HCC): It is likely multifactorial patient was on ACE inhibitor, hydrochlorothiazide, spironolactone, lisinopril, Bactrim and was taking nonsteroidals as well.  She was very symptomatic on initial presentation with unable to move her muscles.  She was managed conservatively in the ER and has significant improvement in her potassium.  2. Chronic kidney disease stage III: She said she has known to have stage II or stage III chronic kidney disease and is seeing me back few years ago but has not followed up.  3. Type 2 diabetes: She said she has tried very hard and finally her hemoglobin A1c was 6.6 that was recently checked.  4. Hyperkalemia: Combination of things as noted above in acute renal failure.  5. Metabolic acidosis: She received multiple doses of IV bicarb and that has corrected most of it.  6. Hypertension: Under reasonable control, hold off giving any more ACE inhibitor's at this point.  7. Hypothyroidism:  8. Gastroesophageal reflux disease:     Risk and complexity: High      PLAN:  · Continue with gentle hydration, renal function is slowly improving. Bicarb slightly low  if it does not improve over the next 24 hours we will have to restart her on oral bicarb. Clinically doing well.  · Details were discussed with the patient no family in the room.    · Continue with rest of the current treatment plan and surveillance labs.  · Further recommendations will depend on clinical course of the patient during the current hospitalization.     Thank you for involving us in the care of Lizeth Bhardwaj.  Please feel free to call with any questions.    Tres Estrella MD, PAYAMN  10/08/21  06:41 EDT    [facility]  [phone]      Much of this encounter note is an electronic transcription/translation of spoken language to printed text. The electronic translation of spoken language may permit erroneous, or at times, nonsensical words or phrases to be inadvertently transcribed; Although I have reviewed the note for such errors, some may still exist.

## 2021-10-09 PROBLEM — E11.65 TYPE 2 DIABETES MELLITUS WITH HYPERGLYCEMIA: Status: ACTIVE | Noted: 2021-10-09

## 2021-10-09 PROBLEM — N30.01 ACUTE CYSTITIS WITH HEMATURIA: Status: ACTIVE | Noted: 2021-10-09

## 2021-10-09 LAB
ANION GAP SERPL CALCULATED.3IONS-SCNC: 11.5 MMOL/L (ref 5–15)
BUN SERPL-MCNC: 48 MG/DL (ref 8–23)
BUN/CREAT SERPL: 18.6 (ref 7–25)
CALCIUM SPEC-SCNC: 8.6 MG/DL (ref 8.6–10.5)
CHLORIDE SERPL-SCNC: 115 MMOL/L (ref 98–107)
CO2 SERPL-SCNC: 15.5 MMOL/L (ref 22–29)
CREAT SERPL-MCNC: 2.58 MG/DL (ref 0.57–1)
DEPRECATED RDW RBC AUTO: 45.6 FL (ref 37–54)
ERYTHROCYTE [DISTWIDTH] IN BLOOD BY AUTOMATED COUNT: 13 % (ref 12.3–15.4)
GFR SERPL CREATININE-BSD FRML MDRD: 19 ML/MIN/1.73
GLUCOSE BLDC GLUCOMTR-MCNC: 144 MG/DL (ref 70–130)
GLUCOSE BLDC GLUCOMTR-MCNC: 153 MG/DL (ref 70–130)
GLUCOSE BLDC GLUCOMTR-MCNC: 171 MG/DL (ref 70–130)
GLUCOSE SERPL-MCNC: 188 MG/DL (ref 65–99)
HCT VFR BLD AUTO: 33.5 % (ref 34–46.6)
HGB BLD-MCNC: 11 G/DL (ref 12–15.9)
MCH RBC QN AUTO: 31 PG (ref 26.6–33)
MCHC RBC AUTO-ENTMCNC: 32.8 G/DL (ref 31.5–35.7)
MCV RBC AUTO: 94.4 FL (ref 79–97)
PLATELET # BLD AUTO: 122 10*3/MM3 (ref 140–450)
PMV BLD AUTO: 11.5 FL (ref 6–12)
POTASSIUM SERPL-SCNC: 4.9 MMOL/L (ref 3.5–5.2)
RBC # BLD AUTO: 3.55 10*6/MM3 (ref 3.77–5.28)
SODIUM SERPL-SCNC: 142 MMOL/L (ref 136–145)
WBC # BLD AUTO: 7.86 10*3/MM3 (ref 3.4–10.8)

## 2021-10-09 PROCEDURE — 63710000001 INSULIN DETEMIR PER 5 UNITS: Performed by: INTERNAL MEDICINE

## 2021-10-09 PROCEDURE — 85027 COMPLETE CBC AUTOMATED: CPT | Performed by: NURSE PRACTITIONER

## 2021-10-09 PROCEDURE — 63710000001 INSULIN ASPART PER 5 UNITS: Performed by: INTERNAL MEDICINE

## 2021-10-09 PROCEDURE — 82962 GLUCOSE BLOOD TEST: CPT

## 2021-10-09 PROCEDURE — 99233 SBSQ HOSP IP/OBS HIGH 50: CPT | Performed by: INTERNAL MEDICINE

## 2021-10-09 PROCEDURE — 25010000002 ENOXAPARIN PER 10 MG: Performed by: INTERNAL MEDICINE

## 2021-10-09 PROCEDURE — 25010000002 CEFTRIAXONE SODIUM-DEXTROSE 1-3.74 GM-%(50ML) RECONSTITUTED SOLUTION: Performed by: INTERNAL MEDICINE

## 2021-10-09 PROCEDURE — 80048 BASIC METABOLIC PNL TOTAL CA: CPT | Performed by: NURSE PRACTITIONER

## 2021-10-09 RX ORDER — SODIUM BICARBONATE 650 MG/1
1300 TABLET ORAL 4 TIMES DAILY
Status: DISCONTINUED | OUTPATIENT
Start: 2021-10-09 | End: 2021-10-10 | Stop reason: HOSPADM

## 2021-10-09 RX ORDER — SODIUM BICARBONATE 650 MG/1
1300 TABLET ORAL 4 TIMES DAILY
Status: DISCONTINUED | OUTPATIENT
Start: 2021-10-09 | End: 2021-10-09

## 2021-10-09 RX ORDER — SODIUM BICARBONATE 650 MG/1
1300 TABLET ORAL ONCE
Status: COMPLETED | OUTPATIENT
Start: 2021-10-09 | End: 2021-10-09

## 2021-10-09 RX ADMIN — LEVOTHYROXINE SODIUM 50 MCG: 50 TABLET ORAL at 06:30

## 2021-10-09 RX ADMIN — METOPROLOL SUCCINATE 100 MG: 100 TABLET, EXTENDED RELEASE ORAL at 09:50

## 2021-10-09 RX ADMIN — SODIUM BICARBONATE 650 MG TABLET 1300 MG: at 10:05

## 2021-10-09 RX ADMIN — SODIUM BICARBONATE 650 MG TABLET 1300 MG: at 09:50

## 2021-10-09 RX ADMIN — ACETAMINOPHEN 650 MG: 325 TABLET ORAL at 09:49

## 2021-10-09 RX ADMIN — Medication 5 MG: at 20:54

## 2021-10-09 RX ADMIN — ASPIRIN 81 MG: 81 TABLET, COATED ORAL at 10:05

## 2021-10-09 RX ADMIN — ENOXAPARIN SODIUM 30 MG: 30 INJECTION SUBCUTANEOUS at 20:54

## 2021-10-09 RX ADMIN — SODIUM BICARBONATE 650 MG TABLET 1300 MG: at 12:38

## 2021-10-09 RX ADMIN — INSULIN ASPART 2 UNITS: 100 INJECTION, SOLUTION INTRAVENOUS; SUBCUTANEOUS at 17:20

## 2021-10-09 RX ADMIN — INSULIN ASPART 2 UNITS: 100 INJECTION, SOLUTION INTRAVENOUS; SUBCUTANEOUS at 12:38

## 2021-10-09 RX ADMIN — SODIUM CHLORIDE, PRESERVATIVE FREE 3 ML: 5 INJECTION INTRAVENOUS at 09:52

## 2021-10-09 RX ADMIN — SODIUM BICARBONATE 650 MG TABLET 1300 MG: at 20:54

## 2021-10-09 RX ADMIN — CEFTRIAXONE 1 G: 1 INJECTION, SOLUTION INTRAVENOUS at 17:50

## 2021-10-09 RX ADMIN — INSULIN DETEMIR 30 UNITS: 100 INJECTION, SOLUTION SUBCUTANEOUS at 21:11

## 2021-10-09 NOTE — PROGRESS NOTES
[facility]    Name:  Lizeth Bhardwaj   Age:  66 y.o.  Sex:  female  :  1955  MRN:  [id number]   Visit Number:  [id number]  Admission Date:  10/6/2021  Date Of Service:  10/09/21  Primary Care Physician:  Sebastian Norman MD     LOS: 3 days :  Patient Care Team:  Sebastian Norman MD as PCP - General (Internal Medicine):    Chief Complaint:      Tired    Subjective / Interval History:     Admitted with acute renal insufficiency and hyperkalemia.  Is on empiric antibiotic therapy.  Responded well to IV fluids and discontinuation of some of her medications.  Significant improvement in her urine output.  Feels significantly better    Review of Systems:   Review of Systems   Constitutional: Positive for fatigue. Negative for activity change, appetite change and fever.   HENT: Negative for congestion, ear discharge, ear pain and trouble swallowing.    Eyes: Negative for photophobia and visual disturbance.   Respiratory: Negative for cough and shortness of breath.    Cardiovascular: Negative for chest pain and palpitations.   Gastrointestinal: Negative for abdominal distention, abdominal pain, constipation, diarrhea, nausea and vomiting.   Endocrine: Negative.    Genitourinary: Negative for dysuria, hematuria and urgency.   Musculoskeletal: Positive for arthralgias and gait problem. Negative for back pain, joint swelling and myalgias.   Skin: Negative for color change and rash.   Allergic/Immunologic: Negative.    Neurological: Negative for dizziness, weakness, light-headedness and headaches.   Hematological: Negative for adenopathy. Does not bruise/bleed easily.   Psychiatric/Behavioral: Positive for sleep disturbance. Negative for agitation, confusion and dysphoric mood. The patient is not nervous/anxious.          Vital Signs:    Temp:  [97.4 °F (36.3 °C)-98.8 °F (37.1 °C)] 97.9 °F (36.6 °C)  Heart Rate:  [59-80] 80  Resp:  [12-17] 13  BP:  (134-182)/(53-70) 182/62    Intake and output:      Intake/Output Summary (Last 24 hours) at 10/9/2021 1027  Last data filed at 10/9/2021 0600  Gross per 24 hour   Intake 2650 ml   Output --   Net 2650 ml     No intake/output data recorded.    Physical Examination:  Physical Exam  Constitutional:       General: She is not in acute distress.     Appearance: She is well-developed.   HENT:      Nose: Nose normal.   Eyes:      General: No scleral icterus.     Conjunctiva/sclera: Conjunctivae normal.   Neck:      Thyroid: No thyromegaly.      Trachea: No tracheal deviation.   Cardiovascular:      Rate and Rhythm: Normal rate and regular rhythm.      Heart sounds: No murmur heard.  No friction rub.   Pulmonary:      Effort: No respiratory distress.      Breath sounds: No wheezing or rales.   Abdominal:      General: There is no distension.      Palpations: Abdomen is soft. There is no mass.      Tenderness: There is no abdominal tenderness. There is no guarding.   Musculoskeletal:         General: Deformity present. Normal range of motion.      Right lower leg: Edema present.      Left lower leg: Edema present.   Lymphadenopathy:      Cervical: No cervical adenopathy.   Skin:     General: Skin is warm and dry.      Findings: No erythema or rash.   Neurological:      Mental Status: She is alert and oriented to person, place, and time.      Cranial Nerves: No cranial nerve deficit.      Coordination: Coordination normal.      Deep Tendon Reflexes: Reflexes are normal and symmetric.   Psychiatric:         Behavior: Behavior normal.         Thought Content: Thought content normal.         Judgment: Judgment normal.           Laboratory results:  Results from last 7 days   Lab Units 10/09/21  0544 10/08/21  0539 10/07/21  0551 10/06/21  2202 10/06/21  1854   SODIUM mmol/L 142 141 142   < > 130*   POTASSIUM mmol/L 4.9 5.2 5.2   < > 8.6*   CHLORIDE mmol/L 115* 115* 114*   < > 109*   CO2 mmol/L 15.5* 15.5* 14.7*   < > 10.7*   BUN  mg/dL 48* 59* 80*   < > 98*   CREATININE mg/dL 2.58* 3.37* 4.32*   < > 5.62*   CALCIUM mg/dL 8.6 8.2* 9.2   < > 9.8   ALBUMIN g/dL  --  3.70  --   --  4.50   BILIRUBIN mg/dL  --  0.2  --   --  0.2   ALK PHOS U/L  --  60  --   --  67   ALT (SGPT) U/L  --  14  --   --  15   AST (SGOT) U/L  --  19  --   --  18   GLUCOSE mg/dL 188* 135* 203*   < > 188*    < > = values in this interval not displayed.     Estimated Creatinine Clearance: 21.8 mL/min (A) (by C-G formula based on SCr of 2.58 mg/dL (H)).  Results from last 7 days   Lab Units 10/06/21  1854   MAGNESIUM mg/dL 2.3         Results from last 7 days   Lab Units 10/09/21  0544 10/08/21  0539 10/06/21  1813   WBC 10*3/mm3 7.86 8.40 13.76*   HEMOGLOBIN g/dL 11.0* 10.5* 13.7   PLATELETS 10*3/mm3 122* 163 247         Brief Urine Lab Results  (Last result in the past 365 days)      Color   Clarity   Blood   Leuk Est   Nitrite   Protein   CREAT   Urine HCG        10/06/21 1813 Yellow   Clear   Negative   Small (1+)   Negative   Negative                   I have reviewed the patient's laboratory results.    Radiology results:    Imaging Results (Last 24 Hours)     ** No results found for the last 24 hours. **          I have reviewed the patient's radiology reports.    Medication Review:     I have reviewed the patients active and prn medications.       Assessment & Plan:    Acute renal failure (HCC) in a patient with suspected baseline renal insufficiency.  Now off spironolactone, ACE inhibitors and Bactrim.  Has had significant improvement in her urine output and drop in creatinine.  Hyperkalemia has been corrected.  Encouraged to drink enough fluids.  Follow BMP again tomorrow.  Will DC IV fluids now because she is drinking enough orally    Acute cystitis with hematuria urine culture with normal ezequiel.  Most likely because she was already on Bactrim.  Has been on Rocephin will discontinue this now.    Type 2 diabetes mellitus with hyperglycemia (HCC) has better oral  intake.  Resume basal insulin at lower dose.  Follow Accu-Cheks  Possible discharge tomorrow          Severo Means MD  10/09/21  10:27 EDT

## 2021-10-09 NOTE — PROGRESS NOTES
[facility] Progress Note  [facility]. [place]        Patient Name: Lizeth Bhardwaj  : 1955  MRN: [id number]   LOS: 3 days    Patient Care Team:  Sebastian Norman MD as PCP - General (Internal Medicine)    Chief Complaint:    Chief Complaint   Patient presents with   • Weakness - Generalized   • Urinary Tract Infection     Primary Care Physician:  Sebastian Norman MD  Date of admission: 10/6/2021    Subjective     Interval History:   Events from last 24 hours noted patient is awake alert interactive. She denies having any chest pain shortness of breath no nausea vomiting no abdominal pain, she said she feels good and wants to go home.  Details about improving renal function discussed, also included high potassium causing medications as well as diet.  Review of Systems:   As noted above    Objective     Vitals:   Temp:  [97.4 °F (36.3 °C)-98.8 °F (37.1 °C)] 98.7 °F (37.1 °C)  Heart Rate:  [59-64] 60  Resp:  [12-17] 16  BP: (130-163)/(53-70) 155/69    Intake/Output Summary (Last 24 hours) at 10/9/2021 0911  Last data filed at 10/9/2021 0600  Gross per 24 hour   Intake 2650 ml   Output --   Net 2650 ml       Physical Exam:    General Appearance: alert, oriented x 3, no acute distress   Skin: warm and dry  HEENT: oral mucosa normal, nonicteric sclera  Neck: supple, no JVD  Lungs: CTA  Heart: RRR, normal S1 and S2  Abdomen: soft, nontender, nondistended  : no palpable bladder  Extremities: no edema, cyanosis or clubbing  Neuro: normal speech and mental status     Scheduled Meds:     Current Facility-Administered Medications   Medication Dose Route Frequency Provider Last Rate Last Admin   • acetaminophen (TYLENOL) tablet 650 mg  650 mg Oral Q4H PRN Estephanie Nixon MD        Or   • acetaminophen (TYLENOL) 160 MG/5ML solution 650 mg  650 mg Oral Q4H PRN Estephanie Nixon MD        Or   • acetaminophen (TYLENOL) suppository 650 mg  650 mg Rectal Q4H PRN Tiffani  MD Estephanie       • aspirin EC tablet 81 mg  81 mg Oral Daily Estephanie Nixon MD   81 mg at 10/08/21 0938   • cefTRIAXone (ROCEPHIN) IVPB 1 g/50ml dextrose (premix)  1 g Intravenous Q24H Estephanie Nixon  mL/hr at 10/08/21 1705 Currently Infusing at 10/08/21 1705   • dextrose (D50W) 25 g/ 50mL Intravenous Solution 25 g  25 g Intravenous Q15 Min PRN Estephanie Nixon MD       • dextrose (D50W) 25 g/ 50mL Intravenous Solution 50 mL  50 mL Intravenous Q1H PRN Estephanie Nixon MD   50 mL at 10/06/21 2046   • dextrose (D50W) 25 g/ 50mL Intravenous Solution 50 mL  50 mL Intravenous Q1H PRN Estephanie Nixon MD   50 mL at 10/06/21 2221   • dextrose (D50W) 25 g/ 50mL Intravenous Solution 50 mL  50 mL Intravenous Q1H PRN Estephanie Nixon MD   50 mL at 10/07/21 0046   • dextrose (D50W) 25 g/ 50mL Intravenous Solution 50 mL  50 mL Intravenous Q1H PRN Estephanie Nixon MD   50 mL at 10/07/21 0413   • dextrose (GLUTOSE) oral gel 1 tube  1 tube Oral Q15 Min PRN Estephanie Nixon MD       • enoxaparin (LOVENOX) syringe 30 mg  30 mg Subcutaneous Nightly Estephanie Nixon MD   30 mg at 10/08/21 2008   • glucagon (human recombinant) (GLUCAGEN DIAGNOSTIC) injection 1 mg  1 mg Subcutaneous PRN Estephanie Nixon MD       • insulin aspart (novoLOG) injection 0-7 Units  0-7 Units Subcutaneous TID AC Estephanie Nixon MD   2 Units at 10/08/21 1729   • levothyroxine (SYNTHROID, LEVOTHROID) tablet 50 mcg  50 mcg Oral Q AM Estephanie Nixon MD   50 mcg at 10/09/21 0630   • melatonin tablet 5 mg  5 mg Oral Nightly PRN Estephanie Nixon MD       • metoprolol succinate XL (TOPROL-XL) 24 hr tablet 100 mg  100 mg Oral Daily Estephanie Nixon MD   100 mg at 10/08/21 0938   • ondansetron (ZOFRAN) injection 4 mg  4 mg Intravenous Q6H PRN Estephanie Nixon MD       • sodium bicarbonate tablet 1,300 mg  1,300 mg Oral BID Tres Estrella MD, FASN   1,300 mg at 10/08/21 2009   • sodium chloride 0.9 % flush 10 mL  10 mL Intravenous PRN Tiffani,  MD Estephanie       • sodium chloride 0.9 % flush 3 mL  3 mL Intravenous Q12H Estephanie Nixon MD   3 mL at 10/08/21 2008   • sodium chloride 0.9 % flush 3-10 mL  3-10 mL Intravenous PRN Estephanie Nixon MD       • sodium chloride 0.9 % infusion  100 mL/hr Intravenous Continuous Estephanie Nixon  mL/hr at 10/08/21 1355 100 mL/hr at 10/08/21 1355       aspirin, 81 mg, Oral, Daily  cefTRIAXone, 1 g, Intravenous, Q24H  enoxaparin, 30 mg, Subcutaneous, Nightly  insulin aspart, 0-7 Units, Subcutaneous, TID AC  levothyroxine, 50 mcg, Oral, Q AM  metoprolol succinate XL, 100 mg, Oral, Daily  sodium bicarbonate, 1,300 mg, Oral, BID  sodium chloride, 3 mL, Intravenous, Q12H        IV Meds:   sodium chloride, 100 mL/hr, Last Rate: 100 mL/hr (10/08/21 1355)        Results Reviewed:   I have personally reviewed the results from the time of this admission to 10/9/2021 09:11 EDT     Results from last 7 days   Lab Units 10/09/21  0544 10/08/21  0539 10/07/21  0551 10/06/21  2202 10/06/21  1854   SODIUM mmol/L 142 141 142   < > 130*   POTASSIUM mmol/L 4.9 5.2 5.2   < > 8.6*   CHLORIDE mmol/L 115* 115* 114*   < > 109*   CO2 mmol/L 15.5* 15.5* 14.7*   < > 10.7*   BUN mg/dL 48* 59* 80*   < > 98*   CREATININE mg/dL 2.58* 3.37* 4.32*   < > 5.62*   CALCIUM mg/dL 8.6 8.2* 9.2   < > 9.8   BILIRUBIN mg/dL  --  0.2  --   --  0.2   ALK PHOS U/L  --  60  --   --  67   ALT (SGPT) U/L  --  14  --   --  15   AST (SGOT) U/L  --  19  --   --  18   GLUCOSE mg/dL 188* 135* 203*   < > 188*    < > = values in this interval not displayed.       Estimated Creatinine Clearance: 21.8 mL/min (A) (by C-G formula based on SCr of 2.58 mg/dL (H)).    Results from last 7 days   Lab Units 10/06/21  1854   MAGNESIUM mg/dL 2.3             Results from last 7 days   Lab Units 10/09/21  0544 10/08/21  0539 10/06/21  1813   WBC 10*3/mm3 7.86 8.40 13.76*   HEMOGLOBIN g/dL 11.0* 10.5* 13.7   PLATELETS 10*3/mm3 122* 163 247             Brief Urine Lab Results   (Last result in the past 365 days)      Color   Clarity   Blood   Leuk Est   Nitrite   Protein   CREAT   Urine HCG        10/06/21 1813 Yellow   Clear   Negative   Small (1+)   Negative   Negative                 No results found for: UTPCR    Imaging Results (Last 24 Hours)     ** No results found for the last 24 hours. **              Assessment / Plan     ASSESSMENT:  1. Acute renal failure (HCC): It is likely multifactorial patient was on ACE inhibitor, hydrochlorothiazide, spironolactone, lisinopril, Bactrim and was taking nonsteroidals as well.  She was very symptomatic on initial presentation with unable to move her muscles.  She was managed conservatively in the ER and has significant improvement in her potassium.  2. Chronic kidney disease stage III: She said she has known to have stage II or stage III chronic kidney disease and is seeing me back few years ago but has not followed up.  3. Type 2 diabetes: She said she has tried very hard and finally her hemoglobin A1c was 6.6 that was recently checked.  4. Hyperkalemia: Combination of things as noted above in acute renal failure.  5. Metabolic acidosis: She received multiple doses of IV bicarb and that has corrected most of it.  6. Hypertension: Under reasonable control, hold off giving any more ACE inhibitor's at this point.  7. Hypothyroidism:  8. Gastroesophageal reflux disease:     Risk and complexity: High      PLAN:  · Continue with gentle hydration, renal function is slowly improving. Bicarb will be increased to 39 mg 4 times a day.  · If renal function continues to improve may be able to go home tomorrow with close follow-up.  We will hold off restarting spironolactone as well as lisinopril.  She can go home with Lasix 20 mg daily as needed.  She has agreed to follow-up with in 2 weeks.  · Details were discussed with the patient no family in the room.  Details discussed with Dr. Means covering for the hospitalist service.  · Continue with rest of  the current treatment plan and surveillance labs.  · Further recommendations will depend on clinical course of the patient during the current hospitalization.     Thank you for involving us in the care of Lizeth Bhardwaj.  Please feel free to call with any questions.    Tres Estrella MD, RICK  10/09/21  09:11 EDT    [facility]  [phone]      Much of this encounter note is an electronic transcription/translation of spoken language to printed text. The electronic translation of spoken language may permit erroneous, or at times, nonsensical words or phrases to be inadvertently transcribed; Although I have reviewed the note for such errors, some may still exist.

## 2021-10-09 NOTE — PLAN OF CARE
Goal Outcome Evaluation:  Plan of Care Reviewed With: patient        Progress: improving  Outcome Summary: Pt iwithout complaints this shift, NSR on monitor, VSS, continuing to monitor labs

## 2021-10-09 NOTE — CASE MANAGEMENT/SOCIAL WORK
Continued Stay Note  DA[facility]cho[facility]     Patient Name: Lizeth Bhardwaj  MRN: [id number]  Today's Date: 10/9/2021    Admit Date: 10/6/2021     Discharge Plan     Row Name 10/09/21 1450       Plan    Plan Pt signed IMM 10-9-21 for discharge tomorrow  Pt voiced understand before signing the IMM  States she is ready to go home               Discharge Codes    No documentation.               Expected Discharge Date and Time     Expected Discharge Date Expected Discharge Time    Oct 9, 2021             Annia Venegas RN

## 2021-10-09 NOTE — SIGNIFICANT NOTE
0400 Received report from JERO Flores RN at 0100 during computer downtime; assumed care of pt at this time. Pt resting in bed, IVF infusing, VSS on montior, NSR, no acute changes during this time.

## 2021-10-10 ENCOUNTER — READMISSION MANAGEMENT (OUTPATIENT)
Dept: CALL CENTER | Facility: HOSPITAL | Age: 66
End: 2021-10-10

## 2021-10-10 VITALS
HEIGHT: 64 IN | OXYGEN SATURATION: 99 % | RESPIRATION RATE: 16 BRPM | HEART RATE: 66 BPM | TEMPERATURE: 98.9 F | WEIGHT: 179 LBS | SYSTOLIC BLOOD PRESSURE: 183 MMHG | BODY MASS INDEX: 30.56 KG/M2 | DIASTOLIC BLOOD PRESSURE: 96 MMHG

## 2021-10-10 PROBLEM — I10 PRIMARY HYPERTENSION: Status: ACTIVE | Noted: 2021-10-10

## 2021-10-10 LAB
ANION GAP SERPL CALCULATED.3IONS-SCNC: 10 MMOL/L (ref 5–15)
BUN SERPL-MCNC: 35 MG/DL (ref 8–23)
BUN/CREAT SERPL: 16.9 (ref 7–25)
CALCIUM SPEC-SCNC: 8.8 MG/DL (ref 8.6–10.5)
CHLORIDE SERPL-SCNC: 113 MMOL/L (ref 98–107)
CO2 SERPL-SCNC: 18 MMOL/L (ref 22–29)
CREAT SERPL-MCNC: 2.07 MG/DL (ref 0.57–1)
GFR SERPL CREATININE-BSD FRML MDRD: 24 ML/MIN/1.73
GLUCOSE BLDC GLUCOMTR-MCNC: 106 MG/DL (ref 70–130)
GLUCOSE BLDC GLUCOMTR-MCNC: 122 MG/DL (ref 70–130)
GLUCOSE SERPL-MCNC: 120 MG/DL (ref 65–99)
POTASSIUM SERPL-SCNC: 4.4 MMOL/L (ref 3.5–5.2)
SODIUM SERPL-SCNC: 141 MMOL/L (ref 136–145)

## 2021-10-10 PROCEDURE — 80048 BASIC METABOLIC PNL TOTAL CA: CPT | Performed by: INTERNAL MEDICINE

## 2021-10-10 PROCEDURE — 82962 GLUCOSE BLOOD TEST: CPT

## 2021-10-10 PROCEDURE — 99239 HOSP IP/OBS DSCHRG MGMT >30: CPT | Performed by: INTERNAL MEDICINE

## 2021-10-10 RX ORDER — LISINOPRIL 40 MG/1
20 TABLET ORAL DAILY
Start: 2021-10-10 | End: 2022-02-11 | Stop reason: ALTCHOICE

## 2021-10-10 RX ORDER — AMLODIPINE BESYLATE 5 MG/1
10 TABLET ORAL DAILY
Status: DISCONTINUED | OUTPATIENT
Start: 2021-10-10 | End: 2021-10-10 | Stop reason: HOSPADM

## 2021-10-10 RX ADMIN — SODIUM BICARBONATE 650 MG TABLET 1300 MG: at 08:48

## 2021-10-10 RX ADMIN — METOPROLOL SUCCINATE 100 MG: 100 TABLET, EXTENDED RELEASE ORAL at 10:32

## 2021-10-10 RX ADMIN — LEVOTHYROXINE SODIUM 50 MCG: 50 TABLET ORAL at 06:10

## 2021-10-10 RX ADMIN — AMLODIPINE BESYLATE 10 MG: 5 TABLET ORAL at 10:32

## 2021-10-10 RX ADMIN — SODIUM BICARBONATE 650 MG TABLET 1300 MG: at 11:44

## 2021-10-10 RX ADMIN — SODIUM CHLORIDE, PRESERVATIVE FREE 3 ML: 5 INJECTION INTRAVENOUS at 08:48

## 2021-10-10 RX ADMIN — ASPIRIN 81 MG: 81 TABLET, COATED ORAL at 08:48

## 2021-10-10 NOTE — PROGRESS NOTES
[facility] Progress Note  [facility]. [place]        Patient Name: Lizeth Bhardwaj  : 1955  MRN: [id number]   LOS: 4 days    Patient Care Team:  Sebastian Norman MD as PCP - General (Internal Medicine)    Chief Complaint:    Chief Complaint   Patient presents with   • Weakness - Generalized   • Urinary Tract Infection     Primary Care Physician:  Sebastian Norman MD  Date of admission: 10/6/2021    Subjective     Interval History:   Events from last 24 hours noted patient is awake alert interactive. She denies having any chest pain shortness of breath no nausea vomiting no abdominal pain, she said she feels good and wants to go home.  Details about improving renal function discussed, also included high potassium causing medications as well as diet.  Review of Systems:   As noted above    Objective     Vitals:   Temp:  [97.2 °F (36.2 °C)-99.2 °F (37.3 °C)] 98.9 °F (37.2 °C)  Heart Rate:  [50-80] 52  Resp:  [16-19] 16  BP: (135-192)/(62-80) 171/77    Intake/Output Summary (Last 24 hours) at 10/10/2021 0848  Last data filed at 10/9/2021 1700  Gross per 24 hour   Intake 536 ml   Output --   Net 536 ml       Physical Exam:    General Appearance: alert, oriented x 3, no acute distress   Skin: warm and dry  HEENT: oral mucosa normal, nonicteric sclera  Neck: supple, no JVD  Lungs: CTA  Heart: RRR, normal S1 and S2  Abdomen: soft, nontender, nondistended  : no palpable bladder  Extremities: no edema, cyanosis or clubbing  Neuro: normal speech and mental status     Scheduled Meds:     Current Facility-Administered Medications   Medication Dose Route Frequency Provider Last Rate Last Admin   • acetaminophen (TYLENOL) tablet 650 mg  650 mg Oral Q4H PRN Estephanie Nixon MD   650 mg at 10/09/21 0949    Or   • acetaminophen (TYLENOL) 160 MG/5ML solution 650 mg  650 mg Oral Q4H PRN Estephanie Nixon MD        Or   • acetaminophen (TYLENOL) suppository 650 mg  650 mg  Rectal Q4H PRN Estephanie Nixon MD       • aspirin EC tablet 81 mg  81 mg Oral Daily Estephanie Nixon MD   81 mg at 10/09/21 1005   • cefTRIAXone (ROCEPHIN) IVPB 1 g/50ml dextrose (premix)  1 g Intravenous Q24H Estephanie Nixon  mL/hr at 10/09/21 1750 1 g at 10/09/21 1750   • dextrose (D50W) 25 g/ 50mL Intravenous Solution 25 g  25 g Intravenous Q15 Min PRN Estephanie Nixon MD       • dextrose (D50W) 25 g/ 50mL Intravenous Solution 50 mL  50 mL Intravenous Q1H PRN Estephanie Nixon MD   50 mL at 10/06/21 2046   • dextrose (D50W) 25 g/ 50mL Intravenous Solution 50 mL  50 mL Intravenous Q1H PRN Estephanie Nixon MD   50 mL at 10/06/21 2221   • dextrose (D50W) 25 g/ 50mL Intravenous Solution 50 mL  50 mL Intravenous Q1H PRN Estephanie Nixon MD   50 mL at 10/07/21 0046   • dextrose (D50W) 25 g/ 50mL Intravenous Solution 50 mL  50 mL Intravenous Q1H PRN Estephanie Nixon MD   50 mL at 10/07/21 0413   • dextrose (GLUTOSE) oral gel 1 tube  1 tube Oral Q15 Min PRN Estephanie Nixon MD       • enoxaparin (LOVENOX) syringe 30 mg  30 mg Subcutaneous Nightly Estephanie Nixon MD   30 mg at 10/09/21 2054   • glucagon (human recombinant) (GLUCAGEN DIAGNOSTIC) injection 1 mg  1 mg Subcutaneous PRN Estephanie Nixon MD       • insulin aspart (novoLOG) injection 0-7 Units  0-7 Units Subcutaneous TID AC Estephanie Nixon MD   2 Units at 10/09/21 1720   • insulin detemir (LEVEMIR) injection 30 Units  30 Units Subcutaneous Nightly Severo Means MD   30 Units at 10/09/21 2111   • levothyroxine (SYNTHROID, LEVOTHROID) tablet 50 mcg  50 mcg Oral Q AM Estephanie Nixon MD   50 mcg at 10/10/21 0610   • melatonin tablet 5 mg  5 mg Oral Nightly PRN Estephanie Nixon MD   5 mg at 10/09/21 2054   • metoprolol succinate XL (TOPROL-XL) 24 hr tablet 100 mg  100 mg Oral Daily Estephanie Nixon MD   100 mg at 10/09/21 0950   • ondansetron (ZOFRAN) injection 4 mg  4 mg Intravenous Q6H PRN Estephanie Nixon MD       • sodium bicarbonate  tablet 1,300 mg  1,300 mg Oral 4x Daily Tres Estrella MD, FASN   1,300 mg at 10/09/21 2054   • sodium chloride 0.9 % flush 10 mL  10 mL Intravenous PRN Estephanie Nixon MD       • sodium chloride 0.9 % flush 3 mL  3 mL Intravenous Q12H Estephanie Nixon MD   3 mL at 10/09/21 0952   • sodium chloride 0.9 % flush 3-10 mL  3-10 mL Intravenous PRN Estephanie Nixon MD           aspirin, 81 mg, Oral, Daily  cefTRIAXone, 1 g, Intravenous, Q24H  enoxaparin, 30 mg, Subcutaneous, Nightly  insulin aspart, 0-7 Units, Subcutaneous, TID AC  insulin detemir, 30 Units, Subcutaneous, Nightly  levothyroxine, 50 mcg, Oral, Q AM  metoprolol succinate XL, 100 mg, Oral, Daily  sodium bicarbonate, 1,300 mg, Oral, 4x Daily  sodium chloride, 3 mL, Intravenous, Q12H        IV Meds:        Results Reviewed:   I have personally reviewed the results from the time of this admission to 10/10/2021 08:48 EDT     Results from last 7 days   Lab Units 10/10/21  0730 10/09/21  0544 10/08/21  0539 10/06/21  2202 10/06/21  1854   SODIUM mmol/L 141 142 141   < > 130*   POTASSIUM mmol/L 4.4 4.9 5.2   < > 8.6*   CHLORIDE mmol/L 113* 115* 115*   < > 109*   CO2 mmol/L 18.0* 15.5* 15.5*   < > 10.7*   BUN mg/dL 35* 48* 59*   < > 98*   CREATININE mg/dL 2.07* 2.58* 3.37*   < > 5.62*   CALCIUM mg/dL 8.8 8.6 8.2*   < > 9.8   BILIRUBIN mg/dL  --   --  0.2  --  0.2   ALK PHOS U/L  --   --  60  --  67   ALT (SGPT) U/L  --   --  14  --  15   AST (SGOT) U/L  --   --  19  --  18   GLUCOSE mg/dL 120* 188* 135*   < > 188*    < > = values in this interval not displayed.       Estimated Creatinine Clearance: 27.2 mL/min (A) (by C-G formula based on SCr of 2.07 mg/dL (H)).    Results from last 7 days   Lab Units 10/06/21  1854   MAGNESIUM mg/dL 2.3             Results from last 7 days   Lab Units 10/09/21  0544 10/08/21  0539 10/06/21  1813   WBC 10*3/mm3 7.86 8.40 13.76*   HEMOGLOBIN g/dL 11.0* 10.5* 13.7   PLATELETS 10*3/mm3 122* 163 247             Brief Urine Lab  Results  (Last result in the past 365 days)      Color   Clarity   Blood   Leuk Est   Nitrite   Protein   CREAT   Urine HCG        10/06/21 1813 Yellow   Clear   Negative   Small (1+)   Negative   Negative                 No results found for: UTPCR    Imaging Results (Last 24 Hours)     ** No results found for the last 24 hours. **              Assessment / Plan     ASSESSMENT:  1. Acute renal failure (HCC): It is likely multifactorial patient was on ACE inhibitor, hydrochlorothiazide, spironolactone, lisinopril, Bactrim and was taking nonsteroidals as well.  She was very symptomatic on initial presentation with unable to move her muscles.  She was managed conservatively in the ER and has significant improvement in her potassium.  2. Chronic kidney disease stage III: She said she has known to have stage II or stage III chronic kidney disease and is seeing me back few years ago but has not followed up.  3. Type 2 diabetes: She said she has tried very hard and finally her hemoglobin A1c was 6.6 that was recently checked.  4. Hyperkalemia: Combination of things as noted above in acute renal failure.  5. Metabolic acidosis: She received multiple doses of IV bicarb and that has corrected most of it.  6. Hypertension: Under reasonable control, hold off giving any more ACE inhibitor's at this point.  7. Hypothyroidism:  8. Gastroesophageal reflux disease:     Risk and complexity: High      PLAN:  · Continue with gentle hydration, renal function is slowly improving. Bicarb will be increased to 1300 mg 4 times a day.  If she goes home today it can be switched to 650 twice a day.  · Renal function is slightly better, will not restart any diuretics or blood pressure medications, she can take Lasix 20 mg as needed for increased blood pressure as well as edema until she gets back to my office.  She will need 5-day follow-up in the office.  She will need to stop by and get a blood test on Wednesday 13 October 2021.  · Details  were discussed with the patient no family in the room.  Details discussed with Dr. Means covering for the hospitalist service.  · Continue with rest of the current treatment plan and surveillance labs.  · Further recommendations will depend on clinical course of the patient during the current hospitalization.     Thank you for involving us in the care of Lizeth Bhardwaj.  Please feel free to call with any questions.    Tres Estrella MD, RICK  10/10/21  08:48 EDT    [facility]  [phone]      Much of this encounter note is an electronic transcription/translation of spoken language to printed text. The electronic translation of spoken language may permit erroneous, or at times, nonsensical words or phrases to be inadvertently transcribed; Although I have reviewed the note for such errors, some may still exist.

## 2021-10-10 NOTE — PLAN OF CARE
Goal Outcome Evaluation:         Patient to be discharged home today. Patient aware and agreeable to discharge.

## 2021-10-10 NOTE — DISCHARGE SUMMARY
[facility]   DISCHARGE SUMMARY      Name:  Lizeth Bhardwaj   Age:  66 y.o.  Sex:  female  :  1955  MRN:  [id number]   Visit Number:  [id number]  Primary Care Physician:  Sebastian Norman MD  Date of Discharge:  10/10/2021  Admission Date:  10/6/2021      Discharge Diagnosis:           Acute renal failure (HCC)    Acute cystitis with hematuria    Type 2 diabetes mellitus with hyperglycemia (HCC)    Primary hypertension      Presenting Problem/History of Present Illness:    Acute renal failure, unspecified acute renal failure type (HCC) [N17.9]     Consults:     Consults     Date and Time Order Name Status Description    10/7/2021  7:25 AM Inpatient Nephrology Consult Completed           Procedures Performed:             History of presenting illness:    66-year-old female with longstanding history of type 2 diabetes hypertension who was recently placed on Bactrim for suspected urinary tract infection.  She developed generalized weakness and fatigue.  Presented to the emergency room with these symptoms noted to have a BUN of 98 and a creatinine of 5.62.  Potassium was noted to be at 8.6 her urine analysis was positive for leukocyte esterase and 1+ bacteria.    Hospital Course:  Patient admitted and placed on IV fluids given 4 rounds of insulin with dextrose.  2 doses of Kayexalate and 3 L of IV fluid.  She was given 2 A of sodium bicarb and placed on a bicarb drip serial BMPs were obtained which showed improvement in her potassium and renal function  It was thought that her acute renal failure was multifactorial with dehydration, Aldactone, Bactrim, ACE inhibitor and use of NSAIDs as the culprits.  She was placed on Rocephin empirically urine culture showed normal ezequiel.  She finished 3 doses of IV Rocephin.  Continue to show significant improvement with her creatinine now down to 2.0.  Elevated BP noted today prior to discharge.  She is on metoprolol and amlodipine.  Is now  being placed back on a lower dose of lisinopril and resumption of HCTZ.  Continue with adequate oral hydration  Potassium level is back to normal  Overall blood sugar control seems to be good with an A1c noted at 6.4.  Review of Systems:  Review of Systems   Constitutional: Negative.  Negative for activity change, appetite change, fatigue and fever.   HENT: Negative for congestion, ear discharge, ear pain and trouble swallowing.    Eyes: Negative for photophobia and visual disturbance.   Respiratory: Negative for cough and shortness of breath.    Cardiovascular: Negative for chest pain and palpitations.   Gastrointestinal: Negative for abdominal distention, abdominal pain, constipation, diarrhea, nausea and vomiting.   Endocrine: Negative.    Genitourinary: Negative for dysuria, hematuria and urgency.   Musculoskeletal: Positive for arthralgias. Negative for back pain, joint swelling and myalgias.   Skin: Negative for color change and rash.   Allergic/Immunologic: Negative.    Neurological: Negative for dizziness, weakness, light-headedness and headaches.   Hematological: Negative for adenopathy. Does not bruise/bleed easily.   Psychiatric/Behavioral: Positive for sleep disturbance. Negative for agitation, confusion and dysphoric mood. The patient is not nervous/anxious.         Vital Signs:    Temp:  [97.2 °F (36.2 °C)-99.2 °F (37.3 °C)] 98.9 °F (37.2 °C)  Heart Rate:  [50-70] 66  Resp:  [16-19] 16  BP: (135-192)/(66-96) 183/96    Physical Exam:  Physical Exam  Constitutional:       General: She is not in acute distress.     Appearance: She is well-developed.   HENT:      Nose: Nose normal.   Eyes:      General: No scleral icterus.     Conjunctiva/sclera: Conjunctivae normal.   Neck:      Thyroid: No thyromegaly.      Trachea: No tracheal deviation.   Cardiovascular:      Rate and Rhythm: Normal rate and regular rhythm.      Heart sounds: No murmur heard.  No friction rub.   Pulmonary:      Effort: No respiratory  distress.      Breath sounds: No wheezing or rales.   Abdominal:      General: There is no distension.      Palpations: Abdomen is soft. There is no mass.      Tenderness: There is no abdominal tenderness. There is no guarding.   Musculoskeletal:         General: Deformity present. Normal range of motion.   Lymphadenopathy:      Cervical: No cervical adenopathy.   Skin:     General: Skin is warm and dry.      Findings: No erythema or rash.   Neurological:      Mental Status: She is alert and oriented to person, place, and time.      Cranial Nerves: No cranial nerve deficit.      Coordination: Coordination normal.      Deep Tendon Reflexes: Reflexes are normal and symmetric.   Psychiatric:         Behavior: Behavior normal.         Thought Content: Thought content normal.         Judgment: Judgment normal.         Pertinent Lab Results:     Lab Results (last 72 hours)     Procedure Component Value Units Date/Time    Basic Metabolic Panel [[id number]]  (Abnormal) Collected: 10/10/21 0730    Specimen: Blood Updated: 10/10/21 0820     Glucose 120 mg/dL      BUN 35 mg/dL      Creatinine 2.07 mg/dL      Sodium 141 mmol/L      Potassium 4.4 mmol/L      Chloride 113 mmol/L      CO2 18.0 mmol/L      Calcium 8.8 mg/dL      eGFR Non African Amer 24 mL/min/1.73      BUN/Creatinine Ratio 16.9     Anion Gap 10.0 mmol/L     Narrative:      GFR Normal >60  Chronic Kidney Disease <60  Kidney Failure <15      POC Glucose Once [[id number]]  (Normal) Collected: 10/10/21 0607    Specimen: Blood Updated: 10/10/21 0625     Glucose 122 mg/dL      Comment: Serial Number: [id number]:  [id number]       POC Glucose Once [[id number]]  (Abnormal) Collected: 10/09/21 2035    Specimen: Blood Updated: 10/09/21 2045     Glucose 153 mg/dL      Comment: Serial Number: [id number]:  [id number]       POC Glucose Once [[id number]]  (Abnormal) Collected: 10/09/21 1129    Specimen: Blood Updated: 10/09/21 1152     Glucose 171 mg/dL      Comment: Serial  Number: [id number]:  [id number]       Basic Metabolic Panel [[id number]]  (Abnormal) Collected: 10/09/21 0544    Specimen: Blood Updated: 10/09/21 0712     Glucose 188 mg/dL      BUN 48 mg/dL      Creatinine 2.58 mg/dL      Sodium 142 mmol/L      Potassium 4.9 mmol/L      Chloride 115 mmol/L      CO2 15.5 mmol/L      Calcium 8.6 mg/dL      eGFR Non African Amer 19 mL/min/1.73      BUN/Creatinine Ratio 18.6     Anion Gap 11.5 mmol/L     Narrative:      GFR Normal >60  Chronic Kidney Disease <60  Kidney Failure <15      POC Glucose Once [[id number]]  (Abnormal) Collected: 10/09/21 0633    Specimen: Blood Updated: 10/09/21 0637     Glucose 144 mg/dL      Comment: Serial Number: [id number]:  [id number]       CBC (No Diff) [[id number]]  (Abnormal) Collected: 10/09/21 0544    Specimen: Blood Updated: 10/09/21 0625     WBC 7.86 10*3/mm3      RBC 3.55 10*6/mm3      Hemoglobin 11.0 g/dL      Hematocrit 33.5 %      MCV 94.4 fL      MCH 31.0 pg      MCHC 32.8 g/dL      RDW 13.0 %      RDW-SD 45.6 fl      MPV 11.5 fL      Platelets 122 10*3/mm3     POC Glucose Once [[id number]]  (Abnormal) Collected: 10/08/21 2112    Specimen: Blood Updated: 10/08/21 2114     Glucose 192 mg/dL      Comment: Serial Number: [id number]:  [id number]       POC Glucose Once [[id number]]  (Abnormal) Collected: 10/08/21 1643    Specimen: Blood Updated: 10/08/21 1653     Glucose 174 mg/dL      Comment: Serial Number: [id number]:  [id number]       Urine Culture - Urine, Urine, Clean Catch [[id number]] Collected: 10/06/21 1813    Specimen: Urine, Clean Catch Updated: 10/08/21 1255     Urine Culture 50,000 CFU/mL Mixed Ezequiel Isolated    Narrative:      Specimen contains mixed organisms of questionable pathogenicity which indicates contamination with commensal ezequiel.  Further identification is unlikely to provide clinically useful information.  Suggest recollection.    POC Glucose Once [[id number]]  (Abnormal) Collected: 10/08/21 1204    Specimen:  Blood Updated: 10/08/21 1208     Glucose 162 mg/dL      Comment: Serial Number: [id number]:  [id number]       Comprehensive Metabolic Panel [[id number]]  (Abnormal) Collected: 10/08/21 0539    Specimen: Blood Updated: 10/08/21 0635     Glucose 135 mg/dL      BUN 59 mg/dL      Creatinine 3.37 mg/dL      Sodium 141 mmol/L      Potassium 5.2 mmol/L      Chloride 115 mmol/L      CO2 15.5 mmol/L      Calcium 8.2 mg/dL      Total Protein 6.2 g/dL      Albumin 3.70 g/dL      ALT (SGPT) 14 U/L      AST (SGOT) 19 U/L      Alkaline Phosphatase 60 U/L      Total Bilirubin 0.2 mg/dL      eGFR Non African Amer 14 mL/min/1.73      Comment: <15 Indicative of kidney failure.        eGFR   Amer --     Comment: <15 Indicative of kidney failure.        Globulin 2.5 gm/dL      A/G Ratio 1.5 g/dL      BUN/Creatinine Ratio 17.5     Anion Gap 10.5 mmol/L     Narrative:      GFR Normal >60  Chronic Kidney Disease <60  Kidney Failure <15      POC Glucose Once [[id number]]  (Normal) Collected: 10/08/21 0624    Specimen: Blood Updated: 10/08/21 0628     Glucose 125 mg/dL      Comment: Serial Number: [id number]:  [id number]       CBC & Differential [[id number]]  (Abnormal) Collected: 10/08/21 0539    Specimen: Blood Updated: 10/08/21 0626    Narrative:      The following orders were created for panel order CBC & Differential.  Procedure                               Abnormality         Status                     ---------                               -----------         ------                     CBC Auto Differential[[id number]]        Abnormal            Final result                 Please view results for these tests on the individual orders.    CBC Auto Differential [[id number]]  (Abnormal) Collected: 10/08/21 0539    Specimen: Blood Updated: 10/08/21 0626     WBC 8.40 10*3/mm3      RBC 3.46 10*6/mm3      Hemoglobin 10.5 g/dL      Hematocrit 31.6 %      MCV 91.3 fL      MCH 30.3 pg      MCHC 33.2 g/dL      RDW 13.2 %      RDW-SD  44.6 fl      MPV 11.3 fL      Platelets 163 10*3/mm3      Neutrophil % 53.4 %      Lymphocyte % 35.1 %      Monocyte % 6.8 %      Eosinophil % 3.9 %      Basophil % 0.6 %      Immature Grans % 0.2 %      Neutrophils, Absolute 4.48 10*3/mm3      Lymphocytes, Absolute 2.95 10*3/mm3      Monocytes, Absolute 0.57 10*3/mm3      Eosinophils, Absolute 0.33 10*3/mm3      Basophils, Absolute 0.05 10*3/mm3      Immature Grans, Absolute 0.02 10*3/mm3      nRBC 0.0 /100 WBC     POC Glucose Once [[id number]]  (Abnormal) Collected: 10/07/21 2048    Specimen: Blood Updated: 10/07/21 2052     Glucose 165 mg/dL      Comment: Serial Number: [id number]:  [id number]       POC Glucose Once [[id number]]  (Abnormal) Collected: 10/07/21 1800    Specimen: Blood Updated: 10/07/21 1803     Glucose 152 mg/dL      Comment: Serial Number: [id number]:  [id number]       POC Glucose Once [[id number]]  (Abnormal) Collected: 10/07/21 1204    Specimen: Blood Updated: 10/07/21 1216     Glucose 168 mg/dL      Comment: Serial Number: [id number]:  [id number]             Pertinent Radiology Results:    Imaging Results (All)     Procedure Component Value Units Date/Time    US Renal Limited [[id number]] Collected: 10/07/21 1544     Updated: 10/07/21 1547    Narrative:      PROCEDURE: US RENAL LIMITED-     HISTORY: acute renal failure; N17.9-Acute kidney failure, unspecified;  E87.5-Hyperkalemia     PROCEDURE: Ultrasound images of the kidneys were obtained.     FINDINGS:.    The kidneys are normal in size and echogenicity with the right kidney  measuring 10.7 cm and the left kidney measuring 11.6 cm. There is no  cystic or solid mass. There is no hydronephrosis.       Impression:      Normal renal ultrasound.     This report was finalized on 10/7/2021 3:44 PM by Jadiel Deng M.D..    XR Chest 1 View [[id number]] Collected: 10/07/21 0751     Updated: 10/07/21 0753    Narrative:      PROCEDURE: XR CHEST 1 VW-     HISTORY: Weak/Dizzy/AMS triage  protocol     COMPARISON: None.     FINDINGS: The heart is normal in size. The mediastinum is unremarkable.  The lungs are clear. There is no pneumothorax.  There are no acute  osseous abnormalities.       Impression:      No acute cardiopulmonary process.     Continued followup is recommended.     This report was finalized on 10/7/2021 7:51 AM by Jadiel Deng M.D..          Condition on Discharge:      Awake alert comfortable.  Good urine output good oral intake.  Creatinine down to 2.0    Code status during the hospital stay:    Code Status and Medical Interventions:   Ordered at: 10/07/21 0620     Code Status:    CPR     Medical Interventions (Level of Support Prior to Arrest):    Full       Discharge Disposition:    Home or Self Care    Discharge Medication:       Discharge Medications      Changes to Medications      Instructions Start Date   lisinopril 40 MG tablet  Commonly known as: PRINIVIL,ZESTRIL  What changed: how much to take   20 mg, Oral, Daily         Continue These Medications      Instructions Start Date   amLODIPine 10 MG tablet  Commonly known as: NORVASC   10 mg, Oral, Daily      aspirin 81 MG EC tablet   81 mg, Oral, Daily      hydroCHLOROthiazide 25 MG tablet  Commonly known as: HYDRODIURIL   25 mg, Oral, Daily      insulin lispro 100 UNIT/ML injection  Commonly known as: humaLOG   10 Units, Subcutaneous, 3 Times Daily Before Meals      levothyroxine 50 MCG tablet  Commonly known as: SYNTHROID, LEVOTHROID   50 mcg, Oral, Daily      metoprolol succinate  MG 24 hr tablet  Commonly known as: TOPROL-XL   100 mg, Oral, Daily      omeprazole 20 MG capsule  Commonly known as: priLOSEC   20 mg, Oral, Daily      Tresiba FlexTouch 100 UNIT/ML solution pen-injector injection  Generic drug: insulin degludec   75 Units, Subcutaneous, Daily         Stop These Medications    spironolactone 25 MG tablet  Commonly known as: ALDACTONE            Discharge Diet: As tolerated with carb  consistent        Activity at Discharge: As tolerated        Follow-up Appointments:     Follow-up Information     Sebastian Norman MD .    Specialty: Internal Medicine  Contact information:  [address] [place] [id number] [phone]                             Test Results Pending at Discharge:    Follow-up with nephrology in 1 week  Discussed plan in detail with patient.  Encouraged to drink enough fluids.  Aldactone has been discontinued  She will need follow-up lab works within a week with her primary care provider  Severo Means MD  10/10/21  11:00 EDT    Time: Discharge 32 min

## 2021-10-11 ENCOUNTER — READMISSION MANAGEMENT (OUTPATIENT)
Dept: CALL CENTER | Facility: HOSPITAL | Age: 66
End: 2021-10-11

## 2021-10-11 NOTE — OUTREACH NOTE
Medical Week 1 Survey      Responses   [facility] [place] patient discharged from? [facility]   Does the patient have one of the following disease processes/diagnoses(primary or secondary)? Other   Week 1 attempt successful? No   Unsuccessful attempts Attempt 1          Cleo Babb RN

## 2021-10-11 NOTE — OUTREACH NOTE
Prep Survey      Responses   [facility] patient discharged from? [facility]   Is LACE score < 7 ? No   Emergency Room discharge w/ pulse ox? No   Eligibility Readm Mgmt   Discharge diagnosis Acute renal failure (HCC)  Acute cystitis with hematuria   Does the patient have one of the following disease processes/diagnoses(primary or secondary)? Other   Does the patient have Home health ordered? No   Is there a DME ordered? No   Prep survey completed? Yes          Catie Garnica RN

## 2021-10-14 ENCOUNTER — READMISSION MANAGEMENT (OUTPATIENT)
Dept: CALL CENTER | Facility: HOSPITAL | Age: 66
End: 2021-10-14

## 2021-10-14 NOTE — OUTREACH NOTE
Medical Week 1 Survey      Responses   [facility] patient discharged from? [facility]   Does the patient have one of the following disease processes/diagnoses(primary or secondary)? Other   Week 1 attempt successful? No   Unsuccessful attempts Attempt 2          Timi Vargas RN

## 2021-10-15 ENCOUNTER — READMISSION MANAGEMENT (OUTPATIENT)
Dept: CALL CENTER | Facility: HOSPITAL | Age: 66
End: 2021-10-15

## 2021-10-15 NOTE — OUTREACH NOTE
Medical Week 1 Survey      Responses   [facility] patient discharged from? [facility]   Does the patient have one of the following disease processes/diagnoses(primary or secondary)? Other   Week 1 attempt successful? No   Unsuccessful attempts Attempt 3          Abril Rm RN

## 2021-10-19 ENCOUNTER — TRANSCRIBE ORDERS (OUTPATIENT)
Dept: ADMINISTRATIVE | Facility: HOSPITAL | Age: 66
End: 2021-10-19

## 2021-10-19 DIAGNOSIS — Z12.31 BREAST CANCER SCREENING BY MAMMOGRAM: Primary | ICD-10-CM

## 2021-10-21 ENCOUNTER — READMISSION MANAGEMENT (OUTPATIENT)
Dept: CALL CENTER | Facility: HOSPITAL | Age: 66
End: 2021-10-21

## 2021-10-21 NOTE — OUTREACH NOTE
Medical Week 2 Survey      Responses   [facility] patient discharged from? [facility]   Does the patient have one of the following disease processes/diagnoses(primary or secondary)? Other   Week 2 attempt successful? No   Unsuccessful attempts Attempt 1          Sonia Cee RN

## 2021-10-22 ENCOUNTER — READMISSION MANAGEMENT (OUTPATIENT)
Dept: CALL CENTER | Facility: HOSPITAL | Age: 66
End: 2021-10-22

## 2021-10-22 NOTE — OUTREACH NOTE
Medical Week 2 Survey      Responses   [facility] patient discharged from? [facility]   Does the patient have one of the following disease processes/diagnoses(primary or secondary)? Other   Week 2 attempt successful? No   Unsuccessful attempts Attempt 2   Discharge diagnosis Acute renal failure (HCC)  Acute cystitis with hematuria          Nahomy Luciano RN

## 2021-10-28 ENCOUNTER — READMISSION MANAGEMENT (OUTPATIENT)
Dept: CALL CENTER | Facility: HOSPITAL | Age: 66
End: 2021-10-28

## 2021-10-28 NOTE — OUTREACH NOTE
Medical Week 3 Survey      Responses   [facility] patient discharged from? [facility]   Does the patient have one of the following disease processes/diagnoses(primary or secondary)? Other   Week 3 attempt successful? No   Unsuccessful attempts Attempt 1          Jaycee Ramos RN

## 2021-10-29 ENCOUNTER — READMISSION MANAGEMENT (OUTPATIENT)
Dept: CALL CENTER | Facility: HOSPITAL | Age: 66
End: 2021-10-29

## 2021-10-29 NOTE — OUTREACH NOTE
Medical Week 3 Survey      Responses   [facility] patient discharged from? [facility]   Does the patient have one of the following disease processes/diagnoses(primary or secondary)? Other   Week 3 attempt successful? No   Unsuccessful attempts Attempt 2   Discharge diagnosis Acute renal failure (HCC)  Acute cystitis with hematuria          Carmenza Fajardo RN

## 2021-11-23 PROCEDURE — U0004 COV-19 TEST NON-CDC HGH THRU: HCPCS | Performed by: NURSE PRACTITIONER

## 2021-12-21 ENCOUNTER — TRANSCRIBE ORDERS (OUTPATIENT)
Dept: ADMINISTRATIVE | Facility: HOSPITAL | Age: 66
End: 2021-12-21

## 2022-02-11 ENCOUNTER — OFFICE VISIT (OUTPATIENT)
Dept: INTERNAL MEDICINE | Facility: CLINIC | Age: 67
End: 2022-02-11

## 2022-02-11 VITALS
BODY MASS INDEX: 29.67 KG/M2 | TEMPERATURE: 97.7 F | DIASTOLIC BLOOD PRESSURE: 88 MMHG | HEIGHT: 64 IN | WEIGHT: 173.8 LBS | HEART RATE: 69 BPM | SYSTOLIC BLOOD PRESSURE: 160 MMHG | OXYGEN SATURATION: 98 %

## 2022-02-11 DIAGNOSIS — Z79.4 TYPE 2 DIABETES MELLITUS WITH HYPERGLYCEMIA, WITH LONG-TERM CURRENT USE OF INSULIN: Primary | ICD-10-CM

## 2022-02-11 DIAGNOSIS — I10 PRIMARY HYPERTENSION: ICD-10-CM

## 2022-02-11 DIAGNOSIS — E11.65 TYPE 2 DIABETES MELLITUS WITH HYPERGLYCEMIA, WITH LONG-TERM CURRENT USE OF INSULIN: Primary | ICD-10-CM

## 2022-02-11 DIAGNOSIS — E13.319 RETINOPATHY DUE TO SECONDARY DIABETES: ICD-10-CM

## 2022-02-11 PROBLEM — N30.01 ACUTE CYSTITIS WITH HEMATURIA: Status: RESOLVED | Noted: 2021-10-09 | Resolved: 2022-02-11

## 2022-02-11 PROCEDURE — 99204 OFFICE O/P NEW MOD 45 MIN: CPT | Performed by: INTERNAL MEDICINE

## 2022-02-11 RX ORDER — LISINOPRIL 20 MG/1
20 TABLET ORAL DAILY
COMMUNITY
Start: 2021-11-24 | End: 2022-02-11 | Stop reason: SDUPTHER

## 2022-02-11 RX ORDER — OMEPRAZOLE 20 MG/1
20 CAPSULE, DELAYED RELEASE ORAL DAILY
Qty: 90 CAPSULE | Refills: 3 | Status: SHIPPED | OUTPATIENT
Start: 2022-02-11 | End: 2023-03-27

## 2022-02-11 RX ORDER — AMLODIPINE BESYLATE 10 MG/1
10 TABLET ORAL DAILY
Qty: 90 TABLET | Refills: 3 | Status: SHIPPED | OUTPATIENT
Start: 2022-02-11 | End: 2022-08-30 | Stop reason: SDUPTHER

## 2022-02-11 RX ORDER — LEVOTHYROXINE SODIUM 0.05 MG/1
50 TABLET ORAL DAILY
Qty: 90 TABLET | Refills: 3 | Status: SHIPPED | OUTPATIENT
Start: 2022-02-11 | End: 2022-05-17 | Stop reason: SDUPTHER

## 2022-02-11 RX ORDER — LISINOPRIL 20 MG/1
20 TABLET ORAL DAILY
Qty: 90 TABLET | Refills: 3 | Status: SHIPPED | OUTPATIENT
Start: 2022-02-11 | End: 2022-03-14 | Stop reason: SDUPTHER

## 2022-02-11 RX ORDER — HYDROCHLOROTHIAZIDE 25 MG/1
25 TABLET ORAL DAILY
Qty: 90 TABLET | Refills: 3 | Status: SHIPPED | OUTPATIENT
Start: 2022-02-11 | End: 2023-03-06

## 2022-02-11 RX ORDER — CARVEDILOL 12.5 MG/1
12.5 TABLET ORAL 2 TIMES DAILY
Qty: 180 TABLET | Refills: 3 | Status: SHIPPED | OUTPATIENT
Start: 2022-02-11 | End: 2022-03-18 | Stop reason: SDUPTHER

## 2022-02-11 RX ORDER — INSULIN DEGLUDEC INJECTION 100 U/ML
75 INJECTION, SOLUTION SUBCUTANEOUS DAILY
Qty: 15 PEN | Refills: 3 | Status: SHIPPED | OUTPATIENT
Start: 2022-02-11 | End: 2022-03-14 | Stop reason: SDUPTHER

## 2022-02-11 RX ORDER — BIOTIN 2500 MCG
2500 CAPSULE ORAL EVERY OTHER DAY
COMMUNITY
End: 2022-06-16

## 2022-02-11 RX ORDER — CARVEDILOL 12.5 MG/1
12.5 TABLET ORAL 2 TIMES DAILY
COMMUNITY
Start: 2022-01-25 | End: 2022-02-11 | Stop reason: SDUPTHER

## 2022-02-11 NOTE — PROGRESS NOTES
"Subjective  Lizeth Bhardwaj is a 66 y.o. female    HPI 66-year-old female with a history of type 2 diabetes for at least 25 years coming in to establish care here recent hospitalization for acute renal failure thought to be secondary to NSAID use, Bactrim dehydration and the use of ACE inhibitors.  Responded well to IV hydration is the second time patient has had acute renal failure in the past few years.  She is reasonably compliant with her diet and nutrition.  She lives by herself  Denies alcohol use Works from home    The following portions of the patient's history were reviewed and updated as appropriate: allergies, current medications, past family history, past medical history, past social history, past surgical history, and problem list.     Review of Systems   Constitutional: Positive for fatigue. Negative for activity change, appetite change and fever.   HENT: Negative for congestion, ear discharge, ear pain and trouble swallowing.    Eyes: Negative for photophobia and visual disturbance.   Respiratory: Negative for cough and shortness of breath.    Cardiovascular: Negative for chest pain and palpitations.   Gastrointestinal: Negative for abdominal distention, abdominal pain, constipation, diarrhea, nausea and vomiting.   Endocrine: Negative.    Genitourinary: Negative for dysuria, hematuria and urgency.   Musculoskeletal: Positive for arthralgias. Negative for back pain, joint swelling and myalgias.   Skin: Negative for color change and rash.   Allergic/Immunologic: Negative.    Neurological: Negative for dizziness, weakness, light-headedness and headaches.   Hematological: Negative for adenopathy. Does not bruise/bleed easily.   Psychiatric/Behavioral: Positive for sleep disturbance. Negative for agitation, confusion and dysphoric mood. The patient is not nervous/anxious.        Visit Vitals  /88   Pulse 69   Temp 97.7 °F (36.5 °C) (Infrared)   Ht 162.6 cm (64\")   Wt 78.8 kg (173 lb 12.8 oz)   SpO2 " 98%   BMI 29.83 kg/m²       Objective  Physical Exam  Constitutional:       General: She is not in acute distress.     Appearance: She is well-developed.   HENT:      Nose: Nose normal.   Eyes:      General: No scleral icterus.     Conjunctiva/sclera: Conjunctivae normal.   Neck:      Thyroid: No thyromegaly.      Trachea: No tracheal deviation.   Cardiovascular:      Rate and Rhythm: Normal rate and regular rhythm.      Heart sounds: No murmur heard.  No friction rub.   Pulmonary:      Effort: No respiratory distress.      Breath sounds: No wheezing or rales.   Abdominal:      General: There is no distension.      Palpations: Abdomen is soft. There is no mass.      Tenderness: There is no abdominal tenderness. There is no guarding.   Musculoskeletal:         General: No deformity. Normal range of motion.   Lymphadenopathy:      Cervical: No cervical adenopathy.   Skin:     General: Skin is warm and dry.      Findings: No erythema or rash.   Neurological:      Mental Status: She is alert and oriented to person, place, and time.      Cranial Nerves: No cranial nerve deficit.      Coordination: Coordination normal.      Deep Tendon Reflexes: Reflexes are normal and symmetric.   Psychiatric:         Behavior: Behavior normal.         Thought Content: Thought content normal.         Judgment: Judgment normal.         Diagnoses and all orders for this visit:    Type 2 diabetes mellitus with hyperglycemia, with long-term current use of insulin ([facility]) with a recent A1c reported by patient at around 6.7.  Has occasional episodes suggestive of hypoglycemia will reduce her basal insulin to 70 units daily continue with insulin with mealtime.  Discussed dietary restrictions.  She is exercising regularly discussed weight loss    Primary hypertension stable with current medications tolerating meds well encouraged adequate hydration.  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  Has visual impairment    Other orders  -     carvedilol (COREG) 12.5 MG tablet; Take 12.5 mg by mouth 2 (Two) Times a Day.  -     lisinopril (PRINIVIL,ZESTRIL) 20 MG tablet; Take 20 mg by mouth Daily.  -     VITAMIN D PO; Take 2,000 Units by mouth Every Other Day.  -     Biotin 2500 MCG capsule; Take 2,500 mcg by mouth Daily.

## 2022-02-28 ENCOUNTER — TELEPHONE (OUTPATIENT)
Dept: INTERNAL MEDICINE | Facility: CLINIC | Age: 67
End: 2022-02-28

## 2022-03-01 NOTE — TELEPHONE ENCOUNTER
Called patient no answer - voice mail full     I spoke with [facility] pharmacy - Lizeth picked up her insulin - (it was to early to  yesterday)

## 2022-03-02 NOTE — TELEPHONE ENCOUNTER
Patient states that insurance is not wanting to pay for the Humalog anymore and is needing something different called in.  She asked if Saba could give her a call when she gets a chance.

## 2022-03-08 NOTE — TELEPHONE ENCOUNTER
Lizeth informed Humalog can be changed to Novolog.     I spoke with the pharmacy - the insurance says it is to early to fill. Next available fill date 3/9.    If the insurance will not cover at that time we can switch brands

## 2022-03-10 ENCOUNTER — HOSPITAL ENCOUNTER (OUTPATIENT)
Dept: MAMMOGRAPHY | Facility: HOSPITAL | Age: 67
Discharge: HOME | End: 2022-03-10
Admitting: INTERNAL MEDICINE

## 2022-03-10 DIAGNOSIS — Z12.31 BREAST CANCER SCREENING BY MAMMOGRAM: ICD-10-CM

## 2022-03-10 PROCEDURE — 77063 BREAST TOMOSYNTHESIS BI: CPT

## 2022-03-10 PROCEDURE — 77067 SCR MAMMO BI INCL CAD: CPT

## 2022-03-14 ENCOUNTER — OFFICE VISIT (OUTPATIENT)
Dept: INTERNAL MEDICINE | Facility: CLINIC | Age: 67
End: 2022-03-14

## 2022-03-14 VITALS
HEIGHT: 64 IN | OXYGEN SATURATION: 97 % | TEMPERATURE: 98 F | DIASTOLIC BLOOD PRESSURE: 70 MMHG | SYSTOLIC BLOOD PRESSURE: 142 MMHG | HEART RATE: 66 BPM | WEIGHT: 169.12 LBS | BODY MASS INDEX: 28.87 KG/M2

## 2022-03-14 DIAGNOSIS — I10 PRIMARY HYPERTENSION: Primary | ICD-10-CM

## 2022-03-14 DIAGNOSIS — Z79.4 TYPE 2 DIABETES MELLITUS WITH HYPERGLYCEMIA, WITH LONG-TERM CURRENT USE OF INSULIN: ICD-10-CM

## 2022-03-14 DIAGNOSIS — E11.65 TYPE 2 DIABETES MELLITUS WITH HYPERGLYCEMIA, WITH LONG-TERM CURRENT USE OF INSULIN: ICD-10-CM

## 2022-03-14 DIAGNOSIS — N18.31 STAGE 3A CHRONIC KIDNEY DISEASE: ICD-10-CM

## 2022-03-14 LAB
EXPIRATION DATE: NORMAL
HBA1C MFR BLD: 6.9 %
Lab: NORMAL

## 2022-03-14 PROCEDURE — G0438 PPPS, INITIAL VISIT: HCPCS | Performed by: INTERNAL MEDICINE

## 2022-03-14 PROCEDURE — 1170F FXNL STATUS ASSESSED: CPT | Performed by: INTERNAL MEDICINE

## 2022-03-14 PROCEDURE — 1159F MED LIST DOCD IN RCRD: CPT | Performed by: INTERNAL MEDICINE

## 2022-03-14 PROCEDURE — 83036 HEMOGLOBIN GLYCOSYLATED A1C: CPT | Performed by: INTERNAL MEDICINE

## 2022-03-14 RX ORDER — INSULIN DEGLUDEC INJECTION 100 U/ML
60 INJECTION, SOLUTION SUBCUTANEOUS DAILY
Qty: 15 PEN | Refills: 3
Start: 2022-03-14 | End: 2022-06-16 | Stop reason: SDUPTHER

## 2022-03-14 RX ORDER — LISINOPRIL 40 MG/1
40 TABLET ORAL DAILY
Qty: 90 TABLET | Refills: 3 | Status: SHIPPED | OUTPATIENT
Start: 2022-03-14

## 2022-03-14 NOTE — PROGRESS NOTES
The ABCs of the Annual Wellness Visit  Initial Medicare Wellness Visit    Chief Complaint   Patient presents with   • Medicare Wellness-subsequent     Follow up on blood pressure      Subjective   History of Present Illness:  Lizeth Bhardwaj is a 66 y.o. female who presents for an Initial Medicare Wellness Visit.    The following portions of the patient's history were reviewed and   updated as appropriate: allergies, current medications, past family history, past medical history, past social history, past surgical history and problem list.     Compared to one year ago, the patient feels her physical   health is better.    Compared to one year ago, the patient feels her mental   health is better.    Recent Hospitalizations:  This patient has had a [facility] admission record on file within the last 365 days.    Current Medical Providers:  Patient Care Team:  Severo Means MD as PCP - General (Internal Medicine)  Tres Estrella MD as Consulting Physician (Nephrology)    Outpatient Medications Prior to Visit   Medication Sig Dispense Refill   • amLODIPine (NORVASC) 10 MG tablet Take 1 tablet by mouth Daily. 90 tablet 3   • aspirin 81 MG EC tablet Take 81 mg by mouth Daily.     • Biotin 2500 MCG capsule Take 2,500 mcg by mouth Every Other Day.     • carvedilol (COREG) 12.5 MG tablet Take 1 tablet by mouth 2 (Two) Times a Day. 180 tablet 3   • hydroCHLOROthiazide (HYDRODIURIL) 25 MG tablet Take 1 tablet by mouth Daily. 90 tablet 3   • insulin degludec (Tresiba FlexTouch) 100 UNIT/ML solution pen-injector injection Inject 75 Units under the skin into the appropriate area as directed Daily. 15 pen 3   • insulin lispro (humaLOG) 100 UNIT/ML injection Inject 10 Units under the skin into the appropriate area as directed 3 (Three) Times a Day Before Meals. 300 mL 5   • levothyroxine (SYNTHROID, LEVOTHROID) 50 MCG tablet Take 1 tablet by mouth Daily. 90 tablet 3   • lisinopril (PRINIVIL,ZESTRIL) 20 MG tablet Take 1  tablet by mouth Daily. 90 tablet 3   • omeprazole (priLOSEC) 20 MG capsule Take 1 capsule by mouth Daily. (Patient taking differently: Take 20 mg by mouth Every Other Day.) 90 capsule 3   • VITAMIN D PO Take 2,000 Units by mouth Every Other Day.       No facility-administered medications prior to visit.       No opioid medication identified on active medication list. I have reviewed chart for other potential  high risk medication/s and harmful drug interactions in the elderly.          Aspirin is on active medication list. Aspirin use is indicated based on review of current medical condition/s. Pros and cons of this therapy have been discussed today. Benefits of this medication outweigh potential harm.  Patient has been encouraged to continue taking this medication.  .      Patient Active Problem List   Diagnosis   • Acute renal failure (HCC)   • Type 2 diabetes mellitus with hyperglycemia (HCC)   • Primary hypertension     Advance Care Planning  Advance Directive is not on file.  ACP discussion was held with the patient during this visit. Patient has an advance directive (not in EMR), copy requested.    Review of Systems   Constitutional: Negative for activity change, appetite change, fatigue and fever.   HENT: Negative for congestion, ear discharge, ear pain and trouble swallowing.    Eyes: Negative for photophobia and visual disturbance.   Respiratory: Negative for cough and shortness of breath.    Cardiovascular: Negative for chest pain and palpitations.   Gastrointestinal: Negative for abdominal distention, constipation, diarrhea, nausea and vomiting.   Genitourinary: Negative for dysuria, hematuria and urgency.   Musculoskeletal: Positive for arthralgias. Negative for back pain, joint swelling and myalgias.   Skin: Negative for color change and rash.   Neurological: Negative for dizziness, weakness, light-headedness and confusion.   Hematological: Negative for adenopathy. Does not bruise/bleed easily.  "  Psychiatric/Behavioral: Positive for sleep disturbance. Negative for agitation and dysphoric mood. The patient is not nervous/anxious.         Objective       Vitals:    03/14/22 1340   BP: 142/70   Pulse: 66   Temp: 98 °F (36.7 °C)   TempSrc: Infrared   SpO2: 97%   Weight: 76.7 kg (169 lb 1.9 oz)   Height: 162.6 cm (64\")   PainSc: 0-No pain     BMI Readings from Last 1 Encounters:   03/14/22 29.03 kg/m²   BMI is above normal parameters. Recommendations include: exercise counseling and nutrition counseling    Does the patient have evidence of cognitive impairment? No    Physical Exam  Constitutional:       General: She is not in acute distress.     Appearance: She is well-developed.   HENT:      Nose: Nose normal.   Eyes:      General: No scleral icterus.     Conjunctiva/sclera: Conjunctivae normal.   Neck:      Thyroid: No thyromegaly.      Trachea: No tracheal deviation.   Cardiovascular:      Rate and Rhythm: Normal rate and regular rhythm.      Heart sounds: No murmur heard.    No friction rub.   Pulmonary:      Effort: No respiratory distress.      Breath sounds: No wheezing or rales.   Abdominal:      General: There is no distension.      Palpations: Abdomen is soft. There is no mass.      Tenderness: There is no abdominal tenderness. There is no guarding.   Musculoskeletal:         General: Deformity present. Normal range of motion.   Lymphadenopathy:      Cervical: No cervical adenopathy.   Skin:     General: Skin is warm and dry.      Findings: No erythema or rash.   Neurological:      Mental Status: She is alert and oriented to person, place, and time.      Cranial Nerves: No cranial nerve deficit.      Coordination: Coordination normal.      Deep Tendon Reflexes: Reflexes are normal and symmetric.   Psychiatric:         Behavior: Behavior normal.         Thought Content: Thought content normal.         Judgment: Judgment normal.               HEALTH RISK ASSESSMENT    Smoking Status:  Social History "     Tobacco Use   Smoking Status Former Smoker   • Packs/day: 0.50   • Years: 3.00   • Pack years: 1.50   • Types: Cigarettes   • Quit date:    • Years since quittin.2   Smokeless Tobacco Never Used     Alcohol Consumption:  Social History     Substance and Sexual Activity   Alcohol Use Not Currently     Fall Risk Screen:    AYAN Fall Risk Assessment was completed, and patient is at LOW risk for falls.Assessment completed on:2022    Depression Screen:   PHQ-2/PHQ-9 Depression Screening 2022   Retired PHQ-9 Total Score 0   Retired Total Score 0       Health Habits and Functional and Cognitive Screening:  Functional & Cognitive Status 2022   Do you have difficulty preparing food and eating? No   Do you have difficulty bathing yourself, getting dressed or grooming yourself? No   Do you have difficulty using the toilet? No   Do you have difficulty moving around from place to place? No   Do you have trouble with steps or getting out of a bed or a chair? No   Current Diet Well Balanced Diet   Dental Exam Up to date   Eye Exam Up to date   Exercise (times per week) 5 times per week   Current Exercises Include Aerobics;Light Weights   Do you need help using the phone?  No   Are you deaf or do you have serious difficulty hearing?  No   Do you need help with transportation? No   Do you need help shopping? No   Do you need help preparing meals?  No   Do you need help with housework?  No   Do you need help with laundry? No   Do you need help taking your medications? No   Do you need help managing money? No   Do you ever drive or ride in a car without wearing a seat belt? No   Have you felt unusual stress, anger or loneliness in the last month? No   Who do you live with? Alone   If you need help, do you have trouble finding someone available to you? No   Have you been bothered in the last four weeks by sexual problems? No   Do you have difficulty concentrating, remembering or making decisions? No        Age-appropriate Screening Schedule:  Refer to the list below for future screening recommendations based on patient's age, sex and/or medical conditions. Orders for these recommended tests are listed in the plan section. The patient has been provided with a written plan.    Health Maintenance   Topic Date Due   • URINE MICROALBUMIN  Never done   • DXA SCAN  Never done   • ZOSTER VACCINE (1 of 2) Never done   • DIABETIC FOOT EXAM  Never done   • PAP SMEAR  Never done   • DIABETIC EYE EXAM  Never done   • HEMOGLOBIN A1C  07/20/2022   • MAMMOGRAM  03/10/2024   • TDAP/TD VACCINES (2 - Td or Tdap) 08/05/2024   • INFLUENZA VACCINE  Completed            Assessment/Plan   CMS Preventative Services Quick Reference  Risk Factors Identified During Encounter  Obesity/Overweight   Polypharmacy  Urinary Incontinence  The above risks/problems have been discussed with the patient.  Follow up actions/plans if indicated are seen below in the Assessment/Plan Section.  Pertinent information has been shared with the patient in the After Visit Summary.    Diagnoses and all orders for this visit:    1. Primary hypertension (Primary) needs better BP control lisinopril has been increased to 40 mg daily continue with carvedilol and amlodipine discussed low-sodium diet.  She is exercising regularly and continues to lose weight    2. Stage 3a chronic kidney disease (HCC) significant improvement in creatinine noted with her last visit to nephrology avoid NSAIDs    3. Type 2 diabetes mellitus with hyperglycemia, with long-term current use of insulin (HCC) more compliant with diet weight loss noted will continue to gradually reduce insulin regimen        Follow Up:  No follow-ups on file.     An After Visit Summary and PPPS were made available to the patient.

## 2022-03-18 ENCOUNTER — TELEPHONE (OUTPATIENT)
Dept: INTERNAL MEDICINE | Facility: CLINIC | Age: 67
End: 2022-03-18

## 2022-03-18 ENCOUNTER — PRIOR AUTHORIZATION (OUTPATIENT)
Dept: INTERNAL MEDICINE | Facility: CLINIC | Age: 67
End: 2022-03-18

## 2022-03-18 RX ORDER — INSULIN ASPART 100 [IU]/ML
10 INJECTION, SOLUTION INTRAVENOUS; SUBCUTANEOUS
Qty: 5 PEN | Refills: 1 | Status: SHIPPED | OUTPATIENT
Start: 2022-03-18 | End: 2022-07-18 | Stop reason: SDUPTHER

## 2022-03-18 RX ORDER — BLOOD-GLUCOSE METER
KIT MISCELLANEOUS
Qty: 1 EACH | Refills: 0 | Status: SHIPPED | OUTPATIENT
Start: 2022-03-18

## 2022-03-18 RX ORDER — CARVEDILOL 25 MG/1
25 TABLET ORAL 2 TIMES DAILY
Qty: 180 TABLET | Refills: 3 | Status: SHIPPED | OUTPATIENT
Start: 2022-03-18

## 2022-03-18 RX ORDER — LANCETS 30 GAUGE
EACH MISCELLANEOUS
Qty: 100 EACH | Refills: 12 | Status: SHIPPED | OUTPATIENT
Start: 2022-03-18 | End: 2022-05-17 | Stop reason: CLARIF

## 2022-03-18 NOTE — TELEPHONE ENCOUNTER
Humalog not covered  - Insurance prefers Novolog with prior auth required.    Cover My Meds - Key: [id number]    Will change to Novolog - script sent - D/C Novolog     Attempted to contact Lizeth to update her. No answer, voice mail full.

## 2022-03-18 NOTE — TELEPHONE ENCOUNTER
Lizeth called to report blood pressure readings 169/80 this morning.    145-170 / 70-80 heart rate averaging 64.    Lizeth does not feel like the increase in Lisinopril is helping

## 2022-03-18 NOTE — TELEPHONE ENCOUNTER
I have increase her carvedilol to 25 mg twice a day.  She can double up on what she is taking now.  I have also called in the new prescription for the higher dose.

## 2022-05-16 RX ORDER — LANCETS 30 GAUGE
EACH MISCELLANEOUS
Qty: 100 EACH | Refills: 12 | Status: CANCELLED | OUTPATIENT
Start: 2022-05-16

## 2022-05-16 RX ORDER — BLOOD-GLUCOSE METER
KIT MISCELLANEOUS
Qty: 1 EACH | Refills: 0 | Status: CANCELLED | OUTPATIENT
Start: 2022-05-16

## 2022-05-16 RX ORDER — LEVOTHYROXINE SODIUM 0.05 MG/1
50 TABLET ORAL DAILY
Qty: 90 TABLET | Refills: 3 | Status: CANCELLED | OUTPATIENT
Start: 2022-05-16

## 2022-05-16 NOTE — TELEPHONE ENCOUNTER
Caller: [facility] - [place], [place] - [address] [facility] - [phone] [facility] [phone] FX    Relationship: Pharmacy    Best call back number: [phone]    Requested Prescriptions:   Requested Prescriptions     Pending Prescriptions Disp Refills   • levothyroxine (SYNTHROID, LEVOTHROID) 50 MCG tablet 90 tablet 3     Sig: Take 1 tablet by mouth Daily.   • glucose monitor monitoring kit 1 each 0     Sig: Test glucose daily for DM. E11.65   • glucose blood test strip 100 each 12     Sig: Test daily for DM E11.65   • Lancets misc 100 each 12     Sig: Test daily for DM E11.65        Pharmacy where request should be sent: [facility] - [place], [place] - [address] [facility] - [phone] [facility] [phone] FX     Additional details provided by patient: PHARMACY STATES THAT PATIENT ALSO NEEDS bd single use alcohol swabs AND true metrix level 1 control solution    Does the patient have less than a 3 day supply:  [x] Yes  [] No    Donnie Smallwood Rep   05/16/22 12:14 EDT

## 2022-05-17 RX ORDER — ISOPROPYL ALCOHOL 0.75 G/1
SWAB TOPICAL
Qty: 100 EACH | Refills: 3 | Status: SHIPPED | OUTPATIENT
Start: 2022-05-17

## 2022-05-17 RX ORDER — GLUCOSAM/CHON-MSM1/C/MANG/BOSW 500-416.6
TABLET ORAL
Qty: 100 EACH | Refills: 3 | Status: SHIPPED | OUTPATIENT
Start: 2022-05-17

## 2022-05-17 RX ORDER — CALCIUM CITRATE/VITAMIN D3 200MG-6.25
TABLET ORAL
Qty: 100 EACH | Refills: 3 | Status: SHIPPED | OUTPATIENT
Start: 2022-05-17 | End: 2022-07-15 | Stop reason: SDUPTHER

## 2022-05-17 RX ORDER — LEVOTHYROXINE SODIUM 0.05 MG/1
50 TABLET ORAL DAILY
Qty: 90 TABLET | Refills: 3 | Status: SHIPPED | OUTPATIENT
Start: 2022-05-17

## 2022-05-17 RX ORDER — BLOOD-GLUCOSE CONTROL, LOW
1 EACH MISCELLANEOUS
Qty: 1 EACH | Refills: 3 | Status: SHIPPED | OUTPATIENT
Start: 2022-05-17

## 2022-05-23 ENCOUNTER — PRIOR AUTHORIZATION (OUTPATIENT)
Dept: INTERNAL MEDICINE | Facility: CLINIC | Age: 67
End: 2022-05-23

## 2022-05-23 NOTE — TELEPHONE ENCOUNTER
Lizeth Bhardwaj Key: [id number] - PA Case ID: [id number] - Rx #: [id number]    Drug  OneTouch Ultra strips

## 2022-06-16 ENCOUNTER — OFFICE VISIT (OUTPATIENT)
Dept: INTERNAL MEDICINE | Facility: CLINIC | Age: 67
End: 2022-06-16

## 2022-06-16 VITALS
OXYGEN SATURATION: 99 % | HEIGHT: 64 IN | TEMPERATURE: 97.8 F | DIASTOLIC BLOOD PRESSURE: 70 MMHG | BODY MASS INDEX: 28.51 KG/M2 | WEIGHT: 167 LBS | HEART RATE: 64 BPM | SYSTOLIC BLOOD PRESSURE: 142 MMHG

## 2022-06-16 DIAGNOSIS — E11.65 TYPE 2 DIABETES MELLITUS WITH HYPERGLYCEMIA, UNSPECIFIED WHETHER LONG TERM INSULIN USE: Primary | ICD-10-CM

## 2022-06-16 DIAGNOSIS — N18.31 STAGE 3A CHRONIC KIDNEY DISEASE: ICD-10-CM

## 2022-06-16 DIAGNOSIS — I10 PRIMARY HYPERTENSION: ICD-10-CM

## 2022-06-16 LAB
EXPIRATION DATE: NORMAL
HBA1C MFR BLD: 7.1 %
Lab: NORMAL

## 2022-06-16 PROCEDURE — 3051F HG A1C>EQUAL 7.0%<8.0%: CPT | Performed by: INTERNAL MEDICINE

## 2022-06-16 PROCEDURE — 99214 OFFICE O/P EST MOD 30 MIN: CPT | Performed by: INTERNAL MEDICINE

## 2022-06-16 PROCEDURE — 83036 HEMOGLOBIN GLYCOSYLATED A1C: CPT | Performed by: INTERNAL MEDICINE

## 2022-06-16 RX ORDER — INSULIN DEGLUDEC INJECTION 100 U/ML
INJECTION, SOLUTION SUBCUTANEOUS
Qty: 15 ML | Refills: 0 | OUTPATIENT
Start: 2022-06-16

## 2022-06-16 RX ORDER — INSULIN DEGLUDEC INJECTION 100 U/ML
40 INJECTION, SOLUTION SUBCUTANEOUS DAILY
Qty: 15 PEN | Refills: 3
Start: 2022-06-16 | End: 2022-06-17 | Stop reason: SDUPTHER

## 2022-06-16 NOTE — PROGRESS NOTES
Subjective   Lizeth Bhardwaj is a 66 y.o. female.     History of Present Illness  Pt presents today for f/u appointment.  HX of HTN, DM II, Renal failure. Pt appears well, in no acute distress. No acute complaints today. Denies fever, chills, headache, CP, fatigue. Pt states she recently stopped taking Tresiba x 2 weeks ago. Wants to not be on so much medication. Pt continues to gradually lose weight.     The following portions of the patient's history were reviewed and updated as appropriate: allergies, current medications, past family history, past medical history, past social history, past surgical history and problem list.    Review of Systems   Constitutional: Negative for activity change, appetite change, chills, fatigue and fever.   HENT: Negative for congestion, ear pain, rhinorrhea and sore throat.    Eyes: Negative for photophobia and pain.   Respiratory: Negative for cough, chest tightness, shortness of breath and wheezing.    Cardiovascular: Negative for chest pain, palpitations and leg swelling.   Gastrointestinal: Negative for abdominal distention, abdominal pain, diarrhea, nausea and vomiting.   Endocrine: Negative for polyuria.   Genitourinary: Negative for difficulty urinating, dysuria, flank pain and hematuria.   Musculoskeletal: Negative for arthralgias, joint swelling and myalgias.   Skin: Negative for color change, pallor and rash.   Neurological: Negative for dizziness, weakness and light-headedness.   Psychiatric/Behavioral: Negative for agitation and confusion.       Objective   Physical Exam  Constitutional:       General: She is not in acute distress.     Appearance: Normal appearance. She is not ill-appearing.   HENT:      Head: Normocephalic.      Nose: No congestion.      Mouth/Throat:      Mouth: Mucous membranes are moist.   Cardiovascular:      Rate and Rhythm: Normal rate and regular rhythm.      Pulses: Normal pulses.      Heart sounds: Normal heart sounds. No murmur  heard.  Pulmonary:      Effort: No respiratory distress.      Breath sounds: No stridor. No wheezing.   Abdominal:      Tenderness: There is no abdominal tenderness. There is no guarding or rebound.   Musculoskeletal:         General: No swelling or tenderness.      Cervical back: No tenderness.   Skin:     Capillary Refill: Capillary refill takes less than 2 seconds.      Coloration: Skin is not jaundiced or pale.      Findings: No erythema or rash.   Neurological:      Mental Status: She is alert.   Psychiatric:         Mood and Affect: Mood normal.         Behavior: Behavior normal.         Assessment & Plan   Diagnoses and all orders for this visit:    1. Type 2 diabetes mellitus with hyperglycemia, unspecified whether long term insulin use (HCC) (Primary): POC A1C in office today was 7.1, previously in march was 6.9 Pt needs to still take Tresiba, but will gradually decrease dosage. Dose decreased to 40 units. Continue with Novolog and Aspirin.Pt educated to maintain healthy dietary choices.    2. Hypertension: well contolled with medication and diet. Continue amlodipine, carvedilol, HCTZ, and lisinopril.  -     POC Glycosylated Hemoglobin (Hb A1C)  CKD.  Continue with adequate hydration follow BMP  Other orders  -     insulin degludec (Tresiba FlexTouch) 100 UNIT/ML solution pen-injector injection; Inject 40 Units under the skin into the appropriate area as directed Daily.  Dispense: 15 pen; Refill: 3

## 2022-06-17 ENCOUNTER — TELEPHONE (OUTPATIENT)
Dept: INTERNAL MEDICINE | Facility: CLINIC | Age: 67
End: 2022-06-17

## 2022-06-17 RX ORDER — INSULIN DEGLUDEC INJECTION 100 U/ML
40 INJECTION, SOLUTION SUBCUTANEOUS DAILY
Qty: 15 PEN | Refills: 3 | Status: SHIPPED | OUTPATIENT
Start: 2022-06-17 | End: 2022-11-04 | Stop reason: SDUPTHER

## 2022-06-17 NOTE — TELEPHONE ENCOUNTER
Caller: Lizeth Bhardwaj    Relationship: Self    Best call back number:[phone]    What was the call regarding: PATIENT CALLED TO FOLLOW UP ON STATUS OF TRESIBA PRESCRIPTION.  STATED DOCTOR AZAM WAS CHANGING THE DOSAGE    PATIENT STATED SHE NEEDS REFILLS ON ALL OF HER MEDICATIONS. PATIENT STATED SHE WAS IN TO SEE AZAM 6/16/22      PHARMACY:    [facility] [address] [place] [address] - [address] [place] [address] [phone] [facility] [phone]   [phone]    Do you require a callback: YES

## 2022-06-28 ENCOUNTER — TELEPHONE (OUTPATIENT)
Dept: INTERNAL MEDICINE | Facility: CLINIC | Age: 67
End: 2022-06-28

## 2022-06-28 NOTE — TELEPHONE ENCOUNTER
I spoke with Lizeth - confirmed she does not want a copy of the medical records from her previous provider.

## 2022-07-15 NOTE — TELEPHONE ENCOUNTER
Caller: Lizeth Bhardwaj    Relationship: Self    Best call back number: [phone]    Requested Prescriptions:   Requested Prescriptions     Pending Prescriptions Disp Refills   • glucose blood (True Metrix Blood Glucose Test) test strip 100 each 3     Sig: Test daily for DM E11.65        Pharmacy where request should be sent:  [facility]     Additional details provided by patient:  PATIENT STATES SHE CHECKS HER BLOOD SUGAR 3 TIMES A DAY AND NEEDS MORE TEST STIPS       Does the patient have less than a 3 day supply:  [x] Yes  [] No

## 2022-07-18 ENCOUNTER — TELEPHONE (OUTPATIENT)
Dept: INTERNAL MEDICINE | Facility: CLINIC | Age: 67
End: 2022-07-18

## 2022-07-18 RX ORDER — INSULIN ASPART 100 [IU]/ML
10 INJECTION, SOLUTION INTRAVENOUS; SUBCUTANEOUS
Qty: 5 PEN | Refills: 1 | Status: SHIPPED | OUTPATIENT
Start: 2022-07-18 | End: 2023-01-06

## 2022-07-18 NOTE — TELEPHONE ENCOUNTER
Caller: Benton Lizethrob Rosa    Relationship: Self    Best call back number: [phone]    Requested Prescriptions:   Requested Prescriptions     Pending Prescriptions Disp Refills   • insulin aspart (NovoLOG FlexPen) 100 UNIT/ML solution pen-injector sc pen 5 pen 1     Sig: Inject 10 Units under the skin into the appropriate area as directed 3 (Three) Times a Day With Meals.        Pharmacy where request should be sent: [facility] [address] [place] [address] [phone] [facility] [phone] FX     Additional details provided by patient: INSURANCE STATES WILL NOT COVER- PLEASE SEND A NEW OPTION FOR MEDICATION.    Does the patient have less than a 3 day supply:  [x] Yes  [] No    Donnie Wagoner Rep   07/18/22 10:34 EDT

## 2022-07-27 ENCOUNTER — TELEPHONE (OUTPATIENT)
Dept: INTERNAL MEDICINE | Facility: CLINIC | Age: 67
End: 2022-07-27

## 2022-07-27 NOTE — TELEPHONE ENCOUNTER
Caller: Lizeth Bhardwaj    Relationship: Self    Best call back number: [phone]    What was the call regarding: PATIENT STATES THAT SHE HAD TO CANCEL HER APPOINTMENT TOMORROW DUE TO WORK. PATIENT STATES THAT HER BLOOD PRESSURE IS VERY GOOD AND HER A1-C IS THE SAME    Do you require a callback: YES IF ANY QUESTIONS

## 2022-08-30 RX ORDER — AMLODIPINE BESYLATE 10 MG/1
10 TABLET ORAL DAILY
Qty: 90 TABLET | Refills: 3 | Status: SHIPPED | OUTPATIENT
Start: 2022-08-30 | End: 2023-03-08 | Stop reason: SDUPTHER

## 2022-10-10 RX ORDER — PEN NEEDLE, DIABETIC 31 GX3/16"
NEEDLE, DISPOSABLE MISCELLANEOUS
Qty: 300 EACH | Refills: 3 | Status: SHIPPED | OUTPATIENT
Start: 2022-10-10

## 2022-11-03 ENCOUNTER — OFFICE VISIT (OUTPATIENT)
Dept: INTERNAL MEDICINE | Facility: CLINIC | Age: 67
End: 2022-11-03

## 2022-11-03 VITALS
TEMPERATURE: 97.8 F | OXYGEN SATURATION: 98 % | HEIGHT: 64 IN | HEART RATE: 80 BPM | DIASTOLIC BLOOD PRESSURE: 70 MMHG | BODY MASS INDEX: 29.37 KG/M2 | SYSTOLIC BLOOD PRESSURE: 130 MMHG | WEIGHT: 172 LBS

## 2022-11-03 DIAGNOSIS — R53.83 OTHER FATIGUE: ICD-10-CM

## 2022-11-03 DIAGNOSIS — N18.31 STAGE 3A CHRONIC KIDNEY DISEASE: ICD-10-CM

## 2022-11-03 DIAGNOSIS — E11.65 TYPE 2 DIABETES MELLITUS WITH HYPERGLYCEMIA, WITH LONG-TERM CURRENT USE OF INSULIN: ICD-10-CM

## 2022-11-03 DIAGNOSIS — Z79.4 TYPE 2 DIABETES MELLITUS WITH HYPERGLYCEMIA, WITH LONG-TERM CURRENT USE OF INSULIN: ICD-10-CM

## 2022-11-03 DIAGNOSIS — I10 PRIMARY HYPERTENSION: ICD-10-CM

## 2022-11-03 DIAGNOSIS — Z12.11 SCREEN FOR COLON CANCER: Primary | ICD-10-CM

## 2022-11-03 PROCEDURE — 99214 OFFICE O/P EST MOD 30 MIN: CPT | Performed by: INTERNAL MEDICINE

## 2022-11-03 NOTE — PROGRESS NOTES
"Subjective  Lizeth Bhardwaj is a 67 y.o. female    HPI coming in for follow-up patient with diabetes and hypertension has suspected diabetic nephropathy is coming in for follow-up has complains of occasional fatigue reasonably compliant with diet and exercise    The following portions of the patient's history were reviewed and updated as appropriate: allergies, current medications, past family history, past medical history, past social history, past surgical history, and problem list.     Review of Systems   Constitutional: Positive for fatigue. Negative for activity change, appetite change and fever.   HENT: Negative for congestion, ear discharge, ear pain and trouble swallowing.    Eyes: Negative for photophobia and visual disturbance.   Respiratory: Negative for cough and shortness of breath.    Cardiovascular: Negative for chest pain and palpitations.   Gastrointestinal: Negative for abdominal distention, abdominal pain, constipation, diarrhea, nausea and vomiting.   Endocrine: Negative.    Genitourinary: Negative for dysuria, hematuria and urgency.   Musculoskeletal: Positive for arthralgias. Negative for back pain, joint swelling and myalgias.   Skin: Negative for color change and rash.   Allergic/Immunologic: Negative.    Neurological: Negative for dizziness, weakness, light-headedness and headaches.   Hematological: Negative for adenopathy. Does not bruise/bleed easily.   Psychiatric/Behavioral: Negative for agitation, confusion and dysphoric mood. The patient is not nervous/anxious.        Visit Vitals  /70   Pulse 80   Temp 97.8 °F (36.6 °C) (Infrared)   Ht 162.6 cm (64\")   Wt 78 kg (172 lb)   SpO2 98%   BMI 29.52 kg/m²       Objective  Physical Exam  Constitutional:       General: She is not in acute distress.     Appearance: She is well-developed.   HENT:      Nose: Nose normal.   Eyes:      General: No scleral icterus.     Conjunctiva/sclera: Conjunctivae normal.   Neck:      Thyroid: No " thyromegaly.      Trachea: No tracheal deviation.   Cardiovascular:      Rate and Rhythm: Normal rate and regular rhythm.      Heart sounds: No murmur heard.    No friction rub.   Pulmonary:      Effort: No respiratory distress.      Breath sounds: No wheezing or rales.   Abdominal:      General: There is no distension.      Palpations: Abdomen is soft. There is no mass.      Tenderness: There is no abdominal tenderness. There is no guarding.   Musculoskeletal:         General: Deformity present. Normal range of motion.   Lymphadenopathy:      Cervical: No cervical adenopathy.   Skin:     General: Skin is warm and dry.      Findings: No erythema or rash.   Neurological:      Mental Status: She is alert and oriented to person, place, and time.      Cranial Nerves: No cranial nerve deficit.      Coordination: Coordination normal.      Deep Tendon Reflexes: Reflexes are normal and symmetric.   Psychiatric:         Behavior: Behavior normal.         Thought Content: Thought content normal.         Judgment: Judgment normal.         Diagnoses and all orders for this visit:    Screen for colon cancer  -     Occult Blood X 1, Stool - Stool, Per Rectum; Future    Type 2 diabetes mellitus with hyperglycemia, with long-term current use of insulin (HCC) continue with current insulin regimen check A1c    Primary hypertension stable BP control okay    Stage 3a chronic kidney disease (HCC) continue with aggressive BP and blood sugar control encouraged to drink enough fluids.  Avoid NSAIDs

## 2022-11-04 LAB
HBA1C MFR BLD: 5.4 % (ref 4.8–5.6)
TSH SERPL DL<=0.005 MIU/L-ACNC: 2.81 UIU/ML (ref 0.45–4.5)

## 2022-11-04 RX ORDER — INSULIN DEGLUDEC INJECTION 100 U/ML
30 INJECTION, SOLUTION SUBCUTANEOUS DAILY
Start: 2022-11-04 | End: 2022-11-23

## 2022-11-07 ENCOUNTER — CLINICAL SUPPORT (OUTPATIENT)
Dept: INTERNAL MEDICINE | Facility: CLINIC | Age: 67
End: 2022-11-07
Payer: MEDICARE

## 2022-11-07 DIAGNOSIS — E11.65 TYPE 2 DIABETES MELLITUS WITH HYPERGLYCEMIA, UNSPECIFIED WHETHER LONG TERM INSULIN USE: Primary | ICD-10-CM

## 2022-11-08 LAB — HBA1C MFR BLD: 7.9 % (ref 4.8–5.6)

## 2022-11-18 ENCOUNTER — CLINICAL SUPPORT (OUTPATIENT)
Dept: INTERNAL MEDICINE | Facility: CLINIC | Age: 67
End: 2022-11-18

## 2022-11-18 DIAGNOSIS — Z12.11 SCREEN FOR COLON CANCER: ICD-10-CM

## 2022-11-18 LAB
DEVELOPER EXPIRATION DATE: NORMAL
DEVELOPER LOT NUMBER: NORMAL
EXPIRATION DATE: NORMAL
FECAL OCCULT BLOOD SCREEN, POC: NEGATIVE
Lab: NORMAL
NEGATIVE CONTROL: NEGATIVE
POSITIVE CONTROL: POSITIVE

## 2022-11-18 PROCEDURE — 82270 OCCULT BLOOD FECES: CPT | Performed by: INTERNAL MEDICINE

## 2022-11-23 RX ORDER — INSULIN DEGLUDEC INJECTION 100 U/ML
INJECTION, SOLUTION SUBCUTANEOUS
Qty: 15 ML | Refills: 0 | Status: SHIPPED | OUTPATIENT
Start: 2022-11-23 | End: 2023-01-06

## 2023-01-06 RX ORDER — INSULIN ASPART 100 [IU]/ML
INJECTION, SOLUTION INTRAVENOUS; SUBCUTANEOUS
Qty: 15 ML | Refills: 3 | Status: SHIPPED | OUTPATIENT
Start: 2023-01-06

## 2023-01-06 RX ORDER — INSULIN DEGLUDEC INJECTION 100 U/ML
INJECTION, SOLUTION SUBCUTANEOUS
Qty: 15 ML | Refills: 3 | Status: SHIPPED | OUTPATIENT
Start: 2023-01-06

## 2023-01-06 NOTE — TELEPHONE ENCOUNTER
Caller: Lizeth Bhardwaj    Relationship: Self    Best call back number: [phone]    What was the call regarding: PATIENT STATES THAT SHE IS OUT OF INSULIN. PLEASE REFILL

## 2023-01-25 ENCOUNTER — TELEPHONE (OUTPATIENT)
Dept: INTERNAL MEDICINE | Facility: CLINIC | Age: 68
End: 2023-01-25
Payer: MEDICARE

## 2023-01-26 NOTE — TELEPHONE ENCOUNTER
LM for patient to call insurance to see what preferred medication is and call back with that information.

## 2023-02-02 ENCOUNTER — TRANSCRIBE ORDERS (OUTPATIENT)
Dept: ADMINISTRATIVE | Facility: HOSPITAL | Age: 68
End: 2023-02-02
Payer: MEDICARE

## 2023-02-02 DIAGNOSIS — Z13.9 SCREENING DUE: Primary | ICD-10-CM

## 2023-02-20 ENCOUNTER — TELEPHONE (OUTPATIENT)
Dept: INTERNAL MEDICINE | Facility: CLINIC | Age: 68
End: 2023-02-20
Payer: MEDICARE

## 2023-02-20 DIAGNOSIS — E11.65 TYPE 2 DIABETES MELLITUS WITH HYPERGLYCEMIA, WITH LONG-TERM CURRENT USE OF INSULIN: Primary | ICD-10-CM

## 2023-02-20 DIAGNOSIS — Z79.4 TYPE 2 DIABETES MELLITUS WITH HYPERGLYCEMIA, WITH LONG-TERM CURRENT USE OF INSULIN: Primary | ICD-10-CM

## 2023-02-20 NOTE — TELEPHONE ENCOUNTER
TOM SAYS HER INSURANCE WILL COVER HUMALOG VIALS-NOT PENS, NOT NOVOLOG ANYMORE.     THE INSURANCE SAYS THEY WILL NOT SEND A LIST OF COVERS INSULIN.  INSURANCE PHONE [phone].    SHE HAS 3 PENS ON HAND

## 2023-02-23 RX ORDER — INSULIN LISPRO 100 [IU]/ML
10 INJECTION, SOLUTION INTRAVENOUS; SUBCUTANEOUS 3 TIMES DAILY
Qty: 15 ML | Refills: 5 | Status: SHIPPED | OUTPATIENT
Start: 2023-02-23

## 2023-03-06 ENCOUNTER — OFFICE VISIT (OUTPATIENT)
Dept: INTERNAL MEDICINE | Facility: CLINIC | Age: 68
End: 2023-03-06
Payer: MEDICARE

## 2023-03-06 VITALS
HEIGHT: 64 IN | HEART RATE: 72 BPM | BODY MASS INDEX: 29.37 KG/M2 | OXYGEN SATURATION: 97 % | DIASTOLIC BLOOD PRESSURE: 72 MMHG | TEMPERATURE: 97 F | SYSTOLIC BLOOD PRESSURE: 124 MMHG | WEIGHT: 172 LBS

## 2023-03-06 DIAGNOSIS — Z79.4 TYPE 2 DIABETES MELLITUS WITH HYPERGLYCEMIA, WITH LONG-TERM CURRENT USE OF INSULIN: ICD-10-CM

## 2023-03-06 DIAGNOSIS — I10 PRIMARY HYPERTENSION: ICD-10-CM

## 2023-03-06 DIAGNOSIS — E11.65 TYPE 2 DIABETES MELLITUS WITH HYPERGLYCEMIA, WITH LONG-TERM CURRENT USE OF INSULIN: ICD-10-CM

## 2023-03-06 DIAGNOSIS — E03.9 ACQUIRED HYPOTHYROIDISM: ICD-10-CM

## 2023-03-06 DIAGNOSIS — N18.31 STAGE 3A CHRONIC KIDNEY DISEASE: Primary | ICD-10-CM

## 2023-03-06 PROBLEM — N17.9 ACUTE RENAL FAILURE: Status: RESOLVED | Noted: 2021-10-06 | Resolved: 2023-03-06

## 2023-03-06 PROCEDURE — 99397 PER PM REEVAL EST PAT 65+ YR: CPT | Performed by: INTERNAL MEDICINE

## 2023-03-06 PROCEDURE — 1170F FXNL STATUS ASSESSED: CPT | Performed by: INTERNAL MEDICINE

## 2023-03-06 PROCEDURE — 1159F MED LIST DOCD IN RCRD: CPT | Performed by: INTERNAL MEDICINE

## 2023-03-06 PROCEDURE — G0439 PPPS, SUBSEQ VISIT: HCPCS | Performed by: INTERNAL MEDICINE

## 2023-03-06 RX ORDER — FUROSEMIDE 40 MG/1
1 TABLET ORAL DAILY
COMMUNITY
Start: 2023-02-28

## 2023-03-06 NOTE — PROGRESS NOTES
The ABCs of the Annual Wellness Visit  Subsequent Medicare Wellness Visit    Subjective        Lizeth Bhardwaj is a 67 y.o. female who presents for a Subsequent Medicare Wellness Visit.    The following portions of the patient's history were reviewed and   updated as appropriate: allergies, current medications, past family history, past medical history, past social history, past surgical history and problem list.    Compared to one year ago, the patient feels her physical   health is better.    Compared to one year ago, the patient feels her mental   health is the same.    Recent Hospitalizations:  She was not admitted to the hospital during the last year.       Current Medical Providers:  Patient Care Team:  Severo Means MD as PCP - General (Internal Medicine)  Tres Estrella MD, RICK as Consulting Physician (Nephrology)  Jessica Celestin MD as Consulting Physician (Ophthalmology)    Outpatient Medications Prior to Visit   Medication Sig Dispense Refill   • Alcohol Swabs (B-D SINGLE USE SWABS REGULAR) pads Test daily for DM E11.65 100 each 3   • amLODIPine (NORVASC) 10 MG tablet Take 1 tablet by mouth Daily. 90 tablet 3   • aspirin 81 MG EC tablet Take 1 tablet by mouth Daily.     • Blood Glucose Calibration (True Metrix Level 1) Low solution 1 each by In Vitro route Every 30 (Thirty) Days. 1 each 3   • Blood Glucose Monitoring Suppl (True Metrix Meter) w/Device kit 1 each Daily. 1 kit 0   • carvedilol (COREG) 25 MG tablet Take 1 tablet by mouth 2 (Two) Times a Day. 180 tablet 3   • furosemide (LASIX) 40 MG tablet Take 1 tablet by mouth Daily.     • glucose blood (True Metrix Blood Glucose Test) test strip Test daily for DM E11.65 100 each 3   • glucose monitor monitoring kit Test glucose daily for DM. E11.65 1 each 0   • Insulin Lispro, 1 Unit Dial, (HumaLOG KwikPen) 100 UNIT/ML solution pen-injector Inject 10 Units under the skin into the appropriate area as directed 3 (Three) Times a Day. 15 mL 5   •  Insulin Pen Needle (Droplet Pen Needles) 31G X 5 MM misc Use daily with insulin 300 each 3   • levothyroxine (SYNTHROID, LEVOTHROID) 50 MCG tablet Take 1 tablet by mouth Daily. 90 tablet 3   • lisinopril (PRINIVIL,ZESTRIL) 40 MG tablet Take 1 tablet by mouth Daily. 90 tablet 3   • NovoLOG FlexPen 100 UNIT/ML solution pen-injector sc pen INJECT 10 UNITS SUBCUTANEOUSLY THREE TIMES DAILY WITH MEALS 15 mL 3   • omeprazole (priLOSEC) 20 MG capsule Take 1 capsule by mouth Daily. (Patient taking differently: Take 1 capsule by mouth Every Other Day.) 90 capsule 3   • Tresiba FlexTouch 100 UNIT/ML solution pen-injector injection INJECT 40 UNITS INTO APPROPRIATE AREA AS DIRECTED DAILY 15 mL 3   • TRUEplus Lancets 33G misc Test daily for DM E11.65 100 each 3   • VITAMIN D PO Take 2,000 Units by mouth Every Other Day.     • hydroCHLOROthiazide (HYDRODIURIL) 25 MG tablet Take 1 tablet by mouth Daily. 90 tablet 3     No facility-administered medications prior to visit.       No opioid medication identified on active medication list. I have reviewed chart for other potential  high risk medication/s and harmful drug interactions in the elderly.          Aspirin is on active medication list. Aspirin use is indicated based on review of current medical condition/s. Pros and cons of this therapy have been discussed today. Benefits of this medication outweigh potential harm.  Patient has been encouraged to continue taking this medication.  .      Patient Active Problem List   Diagnosis   • Type 2 diabetes mellitus with hyperglycemia (HCC)   • Primary hypertension   Review of Systems   Constitutional: Positive for fatigue. Negative for activity change, appetite change and fever.   HENT: Negative for congestion, ear discharge, ear pain and trouble swallowing.    Eyes: Negative for photophobia and visual disturbance.   Respiratory: Negative for cough and shortness of breath.    Cardiovascular: Negative for chest pain and palpitations.    Gastrointestinal: Negative for abdominal distention, abdominal pain, constipation, diarrhea, nausea and vomiting.   Endocrine: Negative.    Genitourinary: Negative for dysuria, hematuria and urgency.   Musculoskeletal: Positive for arthralgias. Negative for back pain, joint swelling and myalgias.   Skin: Negative for color change and rash.   Allergic/Immunologic: Negative.    Neurological: Negative for dizziness, weakness, light-headedness and headaches.   Hematological: Negative for adenopathy. Does not bruise/bleed easily.   Psychiatric/Behavioral: Negative for agitation, confusion and dysphoric mood. The patient is not nervous/anxious.        Advance Care Planning  Advance Directive is not on file.  ACP discussion was held with the patient during this visit. Patient does not have an advance directive, information provided.   Physical Exam  Constitutional:       General: She is not in acute distress.     Appearance: She is well-developed.   HENT:      Nose: Nose normal.   Eyes:      General: No scleral icterus.     Conjunctiva/sclera: Conjunctivae normal.   Neck:      Thyroid: No thyromegaly.      Trachea: No tracheal deviation.   Cardiovascular:      Rate and Rhythm: Normal rate and regular rhythm.      Heart sounds: No murmur heard.    No friction rub.   Pulmonary:      Effort: No respiratory distress.      Breath sounds: No wheezing or rales.   Abdominal:      General: There is no distension.      Palpations: Abdomen is soft. There is no mass.      Tenderness: There is no abdominal tenderness. There is no guarding.   Musculoskeletal:         General: Deformity present. Normal range of motion.   Lymphadenopathy:      Cervical: No cervical adenopathy.   Skin:     General: Skin is warm and dry.      Findings: No erythema or rash.   Neurological:      Mental Status: She is alert and oriented to person, place, and time.      Cranial Nerves: No cranial nerve deficit.      Coordination: Coordination normal.       "Deep Tendon Reflexes: Reflexes are normal and symmetric.   Psychiatric:         Behavior: Behavior normal.         Thought Content: Thought content normal.         Judgment: Judgment normal.          Objective    Vitals:    23 1317   BP: 124/72   BP Location: Right arm   Patient Position: Sitting   Cuff Size: Adult   Pulse: 72   Temp: 97 °F (36.1 °C)   SpO2: 97%   Weight: 78 kg (172 lb)   Height: 162.6 cm (64\")   PainSc: 0-No pain     Estimated body mass index is 29.52 kg/m² as calculated from the following:    Height as of this encounter: 162.6 cm (64\").    Weight as of this encounter: 78 kg (172 lb).    BMI is >= 25 and <30. (Overweight) The following options were offered after discussion;: exercise counseling/recommendations and nutrition counseling/recommendations      Does the patient have evidence of cognitive impairment?   No            HEALTH RISK ASSESSMENT    Smoking Status:  Social History     Tobacco Use   Smoking Status Former   • Packs/day: 0.50   • Years: 3.00   • Pack years: 1.50   • Types: Cigarettes   • Quit date:    • Years since quittin.1   Smokeless Tobacco Never     Alcohol Consumption:  Social History     Substance and Sexual Activity   Alcohol Use Not Currently     Fall Risk Screen:    STEADI Fall Risk Assessment was completed, and patient is at LOW risk for falls.Assessment completed on:3/6/2023    Depression Screening:  PHQ-2/PHQ-9 Depression Screening 3/6/2023   Little Interest or Pleasure in Doing Things 0-->not at all   Feeling Down, Depressed or Hopeless 0-->not at all   PHQ-9: Brief Depression Severity Measure Score 0       Health Habits and Functional and Cognitive Screening:  Functional & Cognitive Status 3/6/2023   Do you have difficulty preparing food and eating? No   Do you have difficulty bathing yourself, getting dressed or grooming yourself? No   Do you have difficulty using the toilet? No   Do you have difficulty moving around from place to place? No   Do you " have trouble with steps or getting out of a bed or a chair? No   Current Diet Well Balanced Diet   Dental Exam Up to date   Eye Exam Up to date   Exercise (times per week) 3 times per week   Current Exercises Include Weightlifting   Do you need help using the phone?  No   Are you deaf or do you have serious difficulty hearing?  No   Do you need help with transportation? No   Do you need help shopping? No   Do you need help preparing meals?  No   Do you need help with housework?  No   Do you need help with laundry? No   Do you need help taking your medications? No   Do you need help managing money? No   Do you ever drive or ride in a car without wearing a seat belt? No   Have you felt unusual stress, anger or loneliness in the last month? No   Who do you live with? Alone   If you need help, do you have trouble finding someone available to you? No   Have you been bothered in the last four weeks by sexual problems? -   Do you have difficulty concentrating, remembering or making decisions? No       Age-appropriate Screening Schedule:  Refer to the list below for future screening recommendations based on patient's age, sex and/or medical conditions. Orders for these recommended tests are listed in the plan section. The patient has been provided with a written plan.    Health Maintenance   Topic Date Due   • URINE MICROALBUMIN  Never done   • DXA SCAN  Never done   • ZOSTER VACCINE (2 of 3) 01/15/2016   • HEPATITIS C SCREENING  Never done   • DIABETIC FOOT EXAM  Never done   • PAP SMEAR  Never done   • COLORECTAL CANCER SCREENING  11/19/2022   • Pneumococcal Vaccine 65+ (3 - PPSV23 if available, else PCV20) 11/03/2023 (Originally 6/18/2022)   • COVID-19 Vaccine (3 - Booster for Taryn series) 11/05/2023 (Originally 1/18/2022)   • ANNUAL WELLNESS VISIT  03/14/2023   • HEMOGLOBIN A1C  05/07/2023   • DIABETIC EYE EXAM  11/01/2023   • MAMMOGRAM  03/10/2024   • TDAP/TD VACCINES (2 - Td or Tdap) 08/05/2024   • INFLUENZA  VACCINE  Completed                CMS Preventative Services Quick Reference  Risk Factors Identified During Encounter:    Polypharmacy: Medication List reviewed and Medications are appropriate for patient    The above risks/problems have been discussed with the patient.  Pertinent information has been shared with the patient in the After Visit Summary.    Diagnoses and all orders for this visit:    1. Stage 3a chronic kidney disease (HCC) (Primary) continues to improve avoid NSAIDs encourage adequate hydration follow labs.  She is following up with nephrology also    2. Primary hypertension discussed low-sodium diet increase cardiovascular exercise continue with amlodipine    3. Type 2 diabetes mellitus with hyperglycemia, with long-term current use of insulin (HCC) discussed dietary restrictions on insulin basal and short-acting.  Follow A1c        Follow Up:   Next Medicare Wellness visit to be scheduled in 1 year.      An After Visit Summary and PPPS were made available to the patient.

## 2023-03-07 LAB
ALBUMIN/CREAT UR: 12 MG/G CREAT (ref 0–29)
CREAT UR-MCNC: 63.2 MG/DL
HCV IGG SERPL QL IA: NON REACTIVE
MICROALBUMIN UR-MCNC: 7.5 UG/ML
TSH SERPL DL<=0.005 MIU/L-ACNC: 1.74 UIU/ML (ref 0.27–4.2)

## 2023-03-08 RX ORDER — AMLODIPINE BESYLATE 10 MG/1
10 TABLET ORAL DAILY
Qty: 90 TABLET | Refills: 3 | Status: SHIPPED | OUTPATIENT
Start: 2023-03-08

## 2023-03-08 NOTE — TELEPHONE ENCOUNTER
Rx Refill Note  Requested Prescriptions     Pending Prescriptions Disp Refills   • amLODIPine (NORVASC) 10 MG tablet 90 tablet 3     Sig: Take 1 tablet by mouth Daily.      Last office visit with prescribing clinician: 3/6/2023   Last telemedicine visit with prescribing clinician: Visit date not found   Next office visit with prescribing clinician:      Last Relevant Lab Date 03/06/2023    Still Needs CBC, metabolic panel     Anita Ramos MA  03/08/23, 09:45 EST

## 2023-03-08 NOTE — TELEPHONE ENCOUNTER
Caller: Lizeth Bhardwaj    Relationship: Self    Best call back number:[phone]   Requested Prescriptions:   Requested Prescriptions     Pending Prescriptions Disp Refills   • amLODIPine (NORVASC) 10 MG tablet 90 tablet 3     Sig: Take 1 tablet by mouth Daily.        Pharmacy where request should be sent: [facility] [address] [place] [address] [phone] [facility] [phone] FX       Does the patient have less than a 3 day supply:  [x] Yes  [] No    Would you like a call back once the refill request has been completed: [] Yes [x] No    If the office needs to give you a call back, can they leave a voicemail: [] Yes [x] No    Donnie Coy Rep   03/08/23 09:06 EST

## 2023-03-27 RX ORDER — OMEPRAZOLE 20 MG/1
CAPSULE, DELAYED RELEASE ORAL
Qty: 90 CAPSULE | Refills: 0 | OUTPATIENT
Start: 2023-03-27

## 2023-03-27 RX ORDER — OMEPRAZOLE 20 MG/1
CAPSULE, DELAYED RELEASE ORAL
Qty: 90 CAPSULE | Refills: 0 | Status: SHIPPED | OUTPATIENT
Start: 2023-03-27

## 2023-03-27 NOTE — TELEPHONE ENCOUNTER
Rx Refill Note  Requested Prescriptions     Pending Prescriptions Disp Refills   • omeprazole (priLOSEC) 20 MG capsule [Pharmacy Med Name: Omeprazole 20 MG Oral Capsule Delayed Release] 90 capsule 0     Sig: Take 1 capsule by mouth once daily      Will deny for reason of : DUPLICATE   Signed 3/27/23   {    Linette Santamaria MA  03/27/23, 15:05 EDT

## 2023-04-17 RX ORDER — CARVEDILOL 12.5 MG/1
TABLET ORAL
Qty: 180 TABLET | Refills: 0 | OUTPATIENT
Start: 2023-04-17

## 2023-04-17 RX ORDER — CARVEDILOL 25 MG/1
25 TABLET ORAL 2 TIMES DAILY
Qty: 180 TABLET | Refills: 3 | Status: SHIPPED | OUTPATIENT
Start: 2023-04-17

## 2023-04-17 NOTE — TELEPHONE ENCOUNTER
Rx Refill Note  Requested Prescriptions     Pending Prescriptions Disp Refills   • carvedilol (COREG) 25 MG tablet 180 tablet 3     Sig: Take 1 tablet by mouth 2 (Two) Times a Day.      Last office visit with prescribing clinician: 3/6/2023   Next office visit with prescribing clinician: 9/18/23    {    Linette Santamaria MA  04/17/23, 09:46 EDT

## 2023-04-17 NOTE — TELEPHONE ENCOUNTER
Rx Refill Note  Requested Prescriptions     Pending Prescriptions Disp Refills   • carvedilol (COREG) 12.5 MG tablet [Pharmacy Med Name: Carvedilol 12.5 MG Oral Tablet] 180 tablet 0     Sig: Take 1 tablet by mouth twice daily      Last office visit with prescribing clinician: 3/6/2023   Next office visit with prescribing clinician: Visit date not found    Will deny for: REFILL INAPP  Not on active med list. Patient currently taking 25mg       Linette Santamaria MA  04/17/23, 09:43 EDT

## 2023-04-19 RX ORDER — LISINOPRIL 40 MG/1
TABLET ORAL
Qty: 90 TABLET | Refills: 3 | Status: SHIPPED | OUTPATIENT
Start: 2023-04-19

## 2023-04-19 NOTE — TELEPHONE ENCOUNTER
Rx Refill Note  Requested Prescriptions     Pending Prescriptions Disp Refills   • lisinopril (PRINIVIL,ZESTRIL) 40 MG tablet [Pharmacy Med Name: Lisinopril 40 MG Oral Tablet] 90 tablet 0     Sig: Take 1 tablet by mouth once daily      Last office visit with prescribing clinician: 3/6/2023  Next office visit with prescribing clinician: 9/18/2023    Linette Santamaria MA  04/19/23, 13:31 EDT

## 2023-04-27 RX ORDER — LEVOTHYROXINE SODIUM 0.05 MG/1
TABLET ORAL
Qty: 90 TABLET | Refills: 1 | Status: SHIPPED | OUTPATIENT
Start: 2023-04-27

## 2023-05-17 ENCOUNTER — TELEPHONE (OUTPATIENT)
Dept: INTERNAL MEDICINE | Facility: CLINIC | Age: 68
End: 2023-05-17
Payer: MEDICARE

## 2023-05-17 NOTE — TELEPHONE ENCOUNTER
Caller: Lizeth Bhardwaj    Relationship: Self    Best call back number: [phone]    What medication are you requesting: OZEMPIC    What are your current symptoms: WEIGHT ISSUES , WHICH STARTED WHEN SHE BEGAN TAKING INSULIN       If a prescription is needed, what is your preferred pharmacy and phone number: [facility] [address] [place] [address] [phone] [facility] [phone]      Additional notes:PATIENT EXPRESSES, THAT SHE HAS TRIED EVERYTHING TO LOSE WEIGHT AND WOULD LIKE TO TRY OZEMPIC TO ASSIST WITH WEIGHT LOSS   PATIENT HAS SEEN PEOPLE TAKING THIS MEDICATION AND LOSING WEIGHT

## 2023-05-19 NOTE — TELEPHONE ENCOUNTER
Called patient , left a V/M (MONIE) letting her know Dr. Means said she needs an appointment to discuss medication. To please give our office a call to schedule an appointment . If further questions to please give our office a call.

## 2023-06-01 ENCOUNTER — TELEPHONE (OUTPATIENT)
Dept: INTERNAL MEDICINE | Facility: CLINIC | Age: 68
End: 2023-06-01

## 2023-06-01 NOTE — TELEPHONE ENCOUNTER
Called patient and informed her per Dr Means that he  will see her today if she is able to make it.

## 2023-06-01 NOTE — TELEPHONE ENCOUNTER
PATIENT SAID HER INSURANCE GAVE HER A VOUCHER FOR TRANSPORTATION AND HER RIDE DID NOT SHOW UP TODAY     SHE WOULD LIKE TO GET IN AS SOON AS POSSIBLE       PATIENT CALL BACK [phone]     PATIENT  WOULD LIKE TO BE SEEN LATER TODAY

## 2023-06-08 ENCOUNTER — OFFICE VISIT (OUTPATIENT)
Dept: INTERNAL MEDICINE | Facility: CLINIC | Age: 68
End: 2023-06-08
Payer: MEDICARE

## 2023-06-08 VITALS
BODY MASS INDEX: 30.48 KG/M2 | TEMPERATURE: 97.3 F | SYSTOLIC BLOOD PRESSURE: 134 MMHG | WEIGHT: 172 LBS | HEART RATE: 64 BPM | DIASTOLIC BLOOD PRESSURE: 76 MMHG | HEIGHT: 63 IN | OXYGEN SATURATION: 98 %

## 2023-06-08 DIAGNOSIS — Z79.4 TYPE 2 DIABETES MELLITUS WITH HYPERGLYCEMIA, WITH LONG-TERM CURRENT USE OF INSULIN: ICD-10-CM

## 2023-06-08 DIAGNOSIS — I10 PRIMARY HYPERTENSION: Primary | ICD-10-CM

## 2023-06-08 DIAGNOSIS — E11.65 TYPE 2 DIABETES MELLITUS WITH HYPERGLYCEMIA, WITH LONG-TERM CURRENT USE OF INSULIN: ICD-10-CM

## 2023-06-08 DIAGNOSIS — E03.9 ACQUIRED HYPOTHYROIDISM: ICD-10-CM

## 2023-06-08 DIAGNOSIS — K21.9 GASTROESOPHAGEAL REFLUX DISEASE WITHOUT ESOPHAGITIS: ICD-10-CM

## 2023-06-08 LAB
ALBUMIN SERPL-MCNC: 4.7 G/DL (ref 3.5–5.2)
ALBUMIN/GLOB SERPL: 1.5 G/DL
ALP SERPL-CCNC: 77 U/L (ref 39–117)
ALT SERPL-CCNC: 23 U/L (ref 1–33)
AST SERPL-CCNC: 22 U/L (ref 1–32)
BILIRUB SERPL-MCNC: 0.3 MG/DL (ref 0–1.2)
BUN SERPL-MCNC: 58 MG/DL (ref 8–23)
BUN/CREAT SERPL: 37.2 (ref 7–25)
CALCIUM SERPL-MCNC: 11.4 MG/DL (ref 8.6–10.5)
CHLORIDE SERPL-SCNC: 104 MMOL/L (ref 98–107)
CHOLEST SERPL-MCNC: 312 MG/DL (ref 0–200)
CO2 SERPL-SCNC: 26.3 MMOL/L (ref 22–29)
CREAT SERPL-MCNC: 1.56 MG/DL (ref 0.57–1)
EGFRCR SERPLBLD CKD-EPI 2021: 36.1 ML/MIN/1.73
GLOBULIN SER CALC-MCNC: 3.1 GM/DL
GLUCOSE SERPL-MCNC: 109 MG/DL (ref 65–99)
HBA1C MFR BLD: 10.5 % (ref 4.8–5.6)
HDLC SERPL-MCNC: 32 MG/DL (ref 40–60)
LDLC SERPL CALC-MCNC: 164 MG/DL (ref 0–100)
POTASSIUM SERPL-SCNC: 5 MMOL/L (ref 3.5–5.2)
PROT SERPL-MCNC: 7.8 G/DL (ref 6–8.5)
SODIUM SERPL-SCNC: 139 MMOL/L (ref 136–145)
T4 FREE SERPL-MCNC: 1.06 NG/DL (ref 0.93–1.7)
TRIGL SERPL-MCNC: 582 MG/DL (ref 0–150)
TSH SERPL DL<=0.005 MIU/L-ACNC: 5.11 UIU/ML (ref 0.27–4.2)
VLDLC SERPL CALC-MCNC: 116 MG/DL (ref 5–40)

## 2023-06-08 PROCEDURE — 1160F RVW MEDS BY RX/DR IN RCRD: CPT | Performed by: INTERNAL MEDICINE

## 2023-06-08 PROCEDURE — 99214 OFFICE O/P EST MOD 30 MIN: CPT | Performed by: INTERNAL MEDICINE

## 2023-06-08 PROCEDURE — 3078F DIAST BP <80 MM HG: CPT | Performed by: INTERNAL MEDICINE

## 2023-06-08 PROCEDURE — 1159F MED LIST DOCD IN RCRD: CPT | Performed by: INTERNAL MEDICINE

## 2023-06-08 PROCEDURE — 3075F SYST BP GE 130 - 139MM HG: CPT | Performed by: INTERNAL MEDICINE

## 2023-06-08 RX ORDER — INSULIN LISPRO 100 [IU]/ML
10 INJECTION, SOLUTION INTRAVENOUS; SUBCUTANEOUS 3 TIMES DAILY
Qty: 15 ML | Refills: 5 | Status: SHIPPED | OUTPATIENT
Start: 2023-06-08

## 2023-06-08 NOTE — PROGRESS NOTES
"Subjective  Lizeth Bhardwaj is a 67 y.o. female    HPI coming in for follow-up patient with a history of diabetes and hypertension has reflux esophagitis and hypothyroidism.  Reasonably compliant with diet and exercise denies alcohol or tobacco use    The following portions of the patient's history were reviewed and updated as appropriate: allergies, current medications, past family history, past medical history, past social history, past surgical history, and problem list.     Review of Systems   Constitutional:  Positive for fatigue. Negative for activity change, appetite change and fever.   HENT:  Negative for congestion, ear discharge, ear pain and trouble swallowing.    Eyes:  Negative for photophobia and visual disturbance.   Respiratory:  Negative for cough and shortness of breath.    Cardiovascular:  Negative for chest pain and palpitations.   Gastrointestinal:  Negative for abdominal distention, abdominal pain, constipation, diarrhea, nausea and vomiting.   Endocrine: Negative.    Genitourinary:  Negative for dysuria, hematuria and urgency.   Musculoskeletal:  Positive for arthralgias. Negative for back pain, joint swelling and myalgias.   Skin:  Negative for color change and rash.   Allergic/Immunologic: Negative.    Neurological:  Negative for dizziness, weakness, light-headedness and headaches.   Hematological:  Negative for adenopathy. Does not bruise/bleed easily.   Psychiatric/Behavioral:  Negative for agitation, confusion and dysphoric mood. The patient is not nervous/anxious.      Visit Vitals  /76   Pulse 64   Temp 97.3 °F (36.3 °C)   Ht 160 cm (63\")   Wt 78 kg (172 lb)   SpO2 98%   BMI 30.47 kg/m²       Objective  Physical Exam  Constitutional:       General: She is not in acute distress.     Appearance: She is well-developed.   HENT:      Nose: Nose normal.   Eyes:      General: No scleral icterus.     Conjunctiva/sclera: Conjunctivae normal.   Neck:      Thyroid: No thyromegaly.      " Trachea: No tracheal deviation.   Cardiovascular:      Rate and Rhythm: Normal rate and regular rhythm.      Heart sounds: No murmur heard.    No friction rub.   Pulmonary:      Effort: No respiratory distress.      Breath sounds: No wheezing or rales.   Abdominal:      General: There is no distension.      Palpations: Abdomen is soft. There is no mass.      Tenderness: There is no abdominal tenderness. There is no guarding.   Musculoskeletal:         General: Deformity present. Normal range of motion.   Lymphadenopathy:      Cervical: No cervical adenopathy.   Skin:     General: Skin is warm and dry.      Findings: No erythema or rash.   Neurological:      Mental Status: She is alert and oriented to person, place, and time.      Cranial Nerves: No cranial nerve deficit.      Coordination: Coordination normal.      Deep Tendon Reflexes: Reflexes are normal and symmetric.   Psychiatric:         Behavior: Behavior normal.         Thought Content: Thought content normal.         Judgment: Judgment normal.       Diagnoses and all orders for this visit:    Primary hypertension stable discussed low-sodium diet continue with carvedilol and amlodipine    Type 2 diabetes mellitus with hyperglycemia, with long-term current use of insulin continue with the dietary restrictions check A1c Will titrate dose accordingly.  Trial of Ozempic for better blood sugar control  -     Insulin Lispro, 1 Unit Dial, (HumaLOG KwikPen) 100 UNIT/ML solution pen-injector; Inject 10 Units under the skin into the appropriate area as directed 3 (Three) Times a Day.  -     Comprehensive Metabolic Panel  -     Hemoglobin A1c  -     Lipid Panel    Gastroesophageal reflux disease without esophagitis discussed reflux precautions advised omeprazole on alternate days    Acquired hypothyroidism clinically euthyroid follow TSH  -     TSH Rfx On Abnormal To Free T4    Other orders  -     Semaglutide,0.25 or 0.5MG/DOS, (OZEMPIC) 2 MG/1.5ML solution  pen-injector; Inject 0.25 mg under the skin into the appropriate area as directed 1 (One) Time Per Week.

## 2023-06-09 ENCOUNTER — TELEPHONE (OUTPATIENT)
Dept: INTERNAL MEDICINE | Facility: CLINIC | Age: 68
End: 2023-06-09

## 2023-06-09 NOTE — TELEPHONE ENCOUNTER
Caller: Lizeth Bhardwaj    Relationship: Self    Best call back number: [phone]    What test was performed: LAB    When was the test performed:6/9    Where was the test performed: OFFICE

## 2023-06-19 RX ORDER — BLOOD SUGAR DIAGNOSTIC
STRIP MISCELLANEOUS
Qty: 100 EACH | Refills: 3 | Status: SHIPPED | OUTPATIENT
Start: 2023-06-19

## 2023-06-23 PROBLEM — H25.12 NUCLEAR SCLEROTIC CATARACT OF LEFT EYE: Status: ACTIVE | Noted: 2023-06-23

## 2023-08-14 ENCOUNTER — TELEPHONE (OUTPATIENT)
Dept: INTERNAL MEDICINE | Facility: CLINIC | Age: 68
End: 2023-08-14
Payer: MEDICARE

## 2023-08-14 DIAGNOSIS — Z79.4 TYPE 2 DIABETES MELLITUS WITH HYPERGLYCEMIA, WITH LONG-TERM CURRENT USE OF INSULIN: ICD-10-CM

## 2023-08-14 DIAGNOSIS — E11.65 TYPE 2 DIABETES MELLITUS WITH HYPERGLYCEMIA, WITH LONG-TERM CURRENT USE OF INSULIN: ICD-10-CM

## 2023-08-14 RX ORDER — INSULIN DEGLUDEC INJECTION 100 U/ML
40 INJECTION, SOLUTION SUBCUTANEOUS DAILY
Qty: 15 ML | Refills: 6 | Status: SHIPPED | OUTPATIENT
Start: 2023-08-14

## 2023-08-14 RX ORDER — BLOOD SUGAR DIAGNOSTIC
1 STRIP MISCELLANEOUS DAILY
Qty: 100 EACH | Refills: 3 | Status: SHIPPED | OUTPATIENT
Start: 2023-08-14

## 2023-08-14 RX ORDER — LISINOPRIL 40 MG/1
40 TABLET ORAL DAILY
Qty: 90 TABLET | Refills: 3 | Status: SHIPPED | OUTPATIENT
Start: 2023-08-14

## 2023-08-14 RX ORDER — ONDANSETRON 4 MG/1
4 TABLET, ORALLY DISINTEGRATING ORAL EVERY 8 HOURS PRN
Qty: 30 TABLET | Refills: 2 | Status: SHIPPED | OUTPATIENT
Start: 2023-08-14

## 2023-08-14 RX ORDER — PEN NEEDLE, DIABETIC 31 GX3/16"
NEEDLE, DISPOSABLE MISCELLANEOUS
Qty: 300 EACH | Refills: 3 | Status: SHIPPED | OUTPATIENT
Start: 2023-08-14

## 2023-08-14 RX ORDER — GLUCOSAM/CHON-MSM1/C/MANG/BOSW 500-416.6
TABLET ORAL
Qty: 100 EACH | Refills: 3 | Status: SHIPPED | OUTPATIENT
Start: 2023-08-14

## 2023-08-14 RX ORDER — INSULIN LISPRO 100 [IU]/ML
10 INJECTION, SOLUTION INTRAVENOUS; SUBCUTANEOUS 3 TIMES DAILY
Qty: 15 ML | Refills: 5 | Status: SHIPPED | OUTPATIENT
Start: 2023-08-14

## 2023-08-14 RX ORDER — CARVEDILOL 25 MG/1
25 TABLET ORAL 2 TIMES DAILY
Qty: 180 TABLET | Refills: 3 | Status: SHIPPED | OUTPATIENT
Start: 2023-08-14

## 2023-08-14 RX ORDER — AMLODIPINE BESYLATE 10 MG/1
10 TABLET ORAL DAILY
Qty: 90 TABLET | Refills: 3 | Status: SHIPPED | OUTPATIENT
Start: 2023-08-14

## 2023-08-14 RX ORDER — ATORVASTATIN CALCIUM 20 MG/1
20 TABLET, FILM COATED ORAL DAILY
Qty: 90 TABLET | Refills: 3 | Status: SHIPPED | OUTPATIENT
Start: 2023-08-14

## 2023-08-14 NOTE — TELEPHONE ENCOUNTER
Caller: Lizeth Bhardwaj    Relationship: Self    Best call back number: [phone]     What is the best time to reach you: ANYTIME, OK TO LEAVE VOICEMAIL.    Who are you requesting to speak with (clinical staff, provider,  specific staff member): CLINICAL STAFF    Do you know the name of the person who called: PATIENT    What was the call regarding: PATIENT ADVISES SHE WOULD LIKE A CALL BACK TO DISCUSS HER CHOLESTEROL AND HER PHARMACY CHOICE. PLEASE ADVISE.    Is it okay if the provider responds through MyChart: NO CALL ONLY

## 2023-08-14 NOTE — TELEPHONE ENCOUNTER
Wants all meds sent to [facility]. [address] [facility] [place] [phone].     Also, said someone at [facility] was asking why she isn't on a cholesterol med. Asked if she should be. Stated that she used to take one and cannot remember who took her off of it.

## 2023-08-15 NOTE — TELEPHONE ENCOUNTER
Caller:   [facility]  Relationship:     Best call back number: [phone]     Requested Prescriptions:   Requested Prescriptions     Pending Prescriptions Disp Refills    furosemide (LASIX) 40 MG tablet       Sig: Take 1 tablet by mouth Daily.    levothyroxine (SYNTHROID, LEVOTHROID) 50 MCG tablet 90 tablet 1     Sig: Take 1 tablet by mouth Daily.        Pharmacy where [place] should be sent: [facility] [place] - [address] - [phone] [facility] [phone] FX     Last office visit with prescribing clinician: 6/8/2023   Last telemedicine visit with prescribing clinician: Visit date not found   Next office visit with prescribing clinician: 9/18/2023         Does the patient have less than a 3 day supply:  [] Yes  [x] No    Would you like a call back once the refill request has been completed: [] Yes [x] No    If the office needs to give you a call back, can they leave a voicemail: [] Yes [x] No    Donnie Coy Rep   08/15/23 10:26 EDT

## 2023-08-16 RX ORDER — FUROSEMIDE 40 MG/1
40 TABLET ORAL DAILY
Qty: 90 TABLET | Refills: 3 | Status: SHIPPED | OUTPATIENT
Start: 2023-08-16

## 2023-08-16 RX ORDER — LEVOTHYROXINE SODIUM 0.05 MG/1
50 TABLET ORAL DAILY
Qty: 90 TABLET | Refills: 3 | Status: SHIPPED | OUTPATIENT
Start: 2023-08-16

## 2023-09-07 RX ORDER — BLOOD SUGAR DIAGNOSTIC
1 STRIP MISCELLANEOUS DAILY
Qty: 100 EACH | Refills: 3 | Status: SHIPPED | OUTPATIENT
Start: 2023-09-07

## 2023-09-07 NOTE — TELEPHONE ENCOUNTER
Caller: Lizeth Bhardwaj Moe    Relationship: Self    Best call back number: [phone]     Requested Prescriptions:   Requested Prescriptions     Pending Prescriptions Disp Refills    glucose blood (OneTouch Ultra) test strip 100 each 3     Si each by Other route Daily. Use as instructed        Pharmacy where request should be sent: [facility] [address] [place] [address] [phone] [facility] [phone] FX     Last office visit with prescribing clinician: 2023   Last telemedicine visit with prescribing clinician: Visit date not found   Next office visit with prescribing clinician: 2023     Additional details provided by patient: PATIENT IS OUT.    Does the patient have less than a 3 day supply:  [x] Yes  [] No    Would you like a call back once the refill request has been completed: [] Yes [x] No    If the office needs to give you a call back, can they leave a voicemail: [] Yes [x] No    Donnie Guadalupe Rep   23 10:29 EDT

## 2023-09-18 ENCOUNTER — OFFICE VISIT (OUTPATIENT)
Dept: INTERNAL MEDICINE | Facility: CLINIC | Age: 68
End: 2023-09-18
Payer: MEDICARE

## 2023-09-18 VITALS
TEMPERATURE: 97.8 F | HEART RATE: 78 BPM | BODY MASS INDEX: 30.72 KG/M2 | DIASTOLIC BLOOD PRESSURE: 82 MMHG | SYSTOLIC BLOOD PRESSURE: 144 MMHG | OXYGEN SATURATION: 98 % | HEIGHT: 63 IN | WEIGHT: 173.4 LBS

## 2023-09-18 DIAGNOSIS — N18.31 STAGE 3A CHRONIC KIDNEY DISEASE: ICD-10-CM

## 2023-09-18 DIAGNOSIS — R39.9 UTI SYMPTOMS: Primary | ICD-10-CM

## 2023-09-18 DIAGNOSIS — Z79.4 TYPE 2 DIABETES MELLITUS WITH HYPERGLYCEMIA, WITH LONG-TERM CURRENT USE OF INSULIN: ICD-10-CM

## 2023-09-18 DIAGNOSIS — E11.65 TYPE 2 DIABETES MELLITUS WITH HYPERGLYCEMIA, WITH LONG-TERM CURRENT USE OF INSULIN: ICD-10-CM

## 2023-09-18 DIAGNOSIS — E03.9 ACQUIRED HYPOTHYROIDISM: ICD-10-CM

## 2023-09-18 DIAGNOSIS — I10 PRIMARY HYPERTENSION: ICD-10-CM

## 2023-09-18 LAB
BILIRUB BLD-MCNC: NEGATIVE MG/DL
CLARITY, POC: CLEAR
COLOR UR: ABNORMAL
EXPIRATION DATE: ABNORMAL
GLUCOSE UR STRIP-MCNC: NEGATIVE MG/DL
KETONES UR QL: NEGATIVE
LEUKOCYTE EST, POC: ABNORMAL
Lab: ABNORMAL
NITRITE UR-MCNC: NEGATIVE MG/ML
PH UR: 6 [PH] (ref 5–8)
PROT UR STRIP-MCNC: NEGATIVE MG/DL
RBC # UR STRIP: NEGATIVE /UL
SP GR UR: 1.01 (ref 1–1.03)
UROBILINOGEN UR QL: ABNORMAL

## 2023-09-18 NOTE — PROGRESS NOTES
"Subjective  Lizeth Bhardwaj is a 68 y.o. female    HPI coming in for follow-up patient with hypertension and hypothyroidism history of chronic kidney disease appears to be improving with avoidance of NSAIDs and proper hydration.  She complains of generalized fatigue has mild dysuria she denies fever or chills no flank pain or gross hematuria has stopped doing her daily walking regimen    The following portions of the patient's history were reviewed and updated as appropriate: allergies, current medications, past family history, past medical history, past social history, past surgical history, and problem list.     Review of Systems   Constitutional:  Positive for fatigue. Negative for activity change, appetite change and fever.   HENT:  Negative for congestion, ear discharge, ear pain and trouble swallowing.    Eyes:  Negative for photophobia and visual disturbance.   Respiratory:  Negative for cough and shortness of breath.    Cardiovascular:  Negative for chest pain and palpitations.   Gastrointestinal:  Negative for abdominal distention, abdominal pain, constipation, diarrhea, nausea and vomiting.   Endocrine: Negative.    Genitourinary:  Negative for dysuria, hematuria and urgency.   Musculoskeletal:  Positive for arthralgias. Negative for back pain, joint swelling and myalgias.   Skin:  Negative for color change and rash.   Allergic/Immunologic: Negative.    Neurological:  Negative for dizziness, weakness, light-headedness and headaches.   Hematological:  Negative for adenopathy. Does not bruise/bleed easily.   Psychiatric/Behavioral:  Positive for sleep disturbance. Negative for agitation, confusion and dysphoric mood. The patient is not nervous/anxious.      Visit Vitals  /82   Pulse 78   Temp 97.8 °F (36.6 °C)   Ht 160 cm (62.99\")   Wt 78.7 kg (173 lb 6.4 oz)   SpO2 98%   BMI 30.72 kg/m²       Objective  Physical Exam  Constitutional:       General: She is not in acute distress.     Appearance: She is " well-developed.   HENT:      Nose: Nose normal.   Eyes:      General: No scleral icterus.     Conjunctiva/sclera: Conjunctivae normal.   Neck:      Thyroid: No thyromegaly.      Trachea: No tracheal deviation.   Cardiovascular:      Rate and Rhythm: Normal rate and regular rhythm.      Heart sounds: No murmur heard.    No friction rub.   Pulmonary:      Effort: No respiratory distress.      Breath sounds: No wheezing or rales.   Abdominal:      General: There is no distension.      Palpations: Abdomen is soft. There is no mass.      Tenderness: There is no abdominal tenderness. There is no guarding.   Musculoskeletal:         General: Deformity present. Normal range of motion.   Lymphadenopathy:      Cervical: No cervical adenopathy.   Skin:     General: Skin is warm and dry.      Findings: No erythema or rash.   Neurological:      Mental Status: She is alert and oriented to person, place, and time.      Cranial Nerves: No cranial nerve deficit.      Coordination: Coordination normal.      Deep Tendon Reflexes: Reflexes are normal and symmetric.   Psychiatric:         Behavior: Behavior normal.         Thought Content: Thought content normal.         Judgment: Judgment normal.       Diagnoses and all orders for this visit:    UTI symptoms  -     POCT urinalysis dipstick, automated    Type 2 diabetes mellitus with hyperglycemia, with long-term current use of insulin    Primary hypertension discussed low-sodium diet doing reasonably well on amlodipine 10 mg daily along with carvedilol 25 twice daily encourage a walking regimen follow BP readings at home    Acquired hypothyroidism clinically euthyroid.  Continue levothyroxine repeat labs with complains of fatigue    Stage 3a chronic kidney disease avoid NSAIDs encouraged to drink enough fluids on lisinopril

## 2023-09-19 LAB
ALBUMIN SERPL-MCNC: 4.8 G/DL (ref 3.5–5.2)
ALBUMIN/GLOB SERPL: 1.7 G/DL
ALP SERPL-CCNC: 87 U/L (ref 39–117)
ALT SERPL-CCNC: 20 U/L (ref 1–33)
AST SERPL-CCNC: 22 U/L (ref 1–32)
BASOPHILS # BLD AUTO: 0.07 10*3/MM3 (ref 0–0.2)
BASOPHILS NFR BLD AUTO: 0.9 % (ref 0–1.5)
BILIRUB SERPL-MCNC: 0.3 MG/DL (ref 0–1.2)
BUN SERPL-MCNC: 30 MG/DL (ref 8–23)
BUN/CREAT SERPL: 20.8 (ref 7–25)
CALCIUM SERPL-MCNC: 10.6 MG/DL (ref 8.6–10.5)
CHLORIDE SERPL-SCNC: 104 MMOL/L (ref 98–107)
CO2 SERPL-SCNC: 25.9 MMOL/L (ref 22–29)
CREAT SERPL-MCNC: 1.44 MG/DL (ref 0.57–1)
EGFRCR SERPLBLD CKD-EPI 2021: 39.5 ML/MIN/1.73
EOSINOPHIL # BLD AUTO: 0.28 10*3/MM3 (ref 0–0.4)
EOSINOPHIL NFR BLD AUTO: 3.6 % (ref 0.3–6.2)
ERYTHROCYTE [DISTWIDTH] IN BLOOD BY AUTOMATED COUNT: 13.2 % (ref 12.3–15.4)
GLOBULIN SER CALC-MCNC: 2.8 GM/DL
GLUCOSE SERPL-MCNC: 112 MG/DL (ref 65–99)
HBA1C MFR BLD: 8.9 % (ref 4.8–5.6)
HCT VFR BLD AUTO: 37.2 % (ref 34–46.6)
HGB BLD-MCNC: 12.7 G/DL (ref 12–15.9)
IMM GRANULOCYTES # BLD AUTO: 0.02 10*3/MM3 (ref 0–0.05)
IMM GRANULOCYTES NFR BLD AUTO: 0.3 % (ref 0–0.5)
LYMPHOCYTES # BLD AUTO: 2.24 10*3/MM3 (ref 0.7–3.1)
LYMPHOCYTES NFR BLD AUTO: 29 % (ref 19.6–45.3)
MCH RBC QN AUTO: 29.5 PG (ref 26.6–33)
MCHC RBC AUTO-ENTMCNC: 34.1 G/DL (ref 31.5–35.7)
MCV RBC AUTO: 86.5 FL (ref 79–97)
MONOCYTES # BLD AUTO: 0.58 10*3/MM3 (ref 0.1–0.9)
MONOCYTES NFR BLD AUTO: 7.5 % (ref 5–12)
NEUTROPHILS # BLD AUTO: 4.54 10*3/MM3 (ref 1.7–7)
NEUTROPHILS NFR BLD AUTO: 58.7 % (ref 42.7–76)
NRBC BLD AUTO-RTO: 0 /100 WBC (ref 0–0.2)
PLATELET # BLD AUTO: 241 10*3/MM3 (ref 140–450)
POTASSIUM SERPL-SCNC: 4.3 MMOL/L (ref 3.5–5.2)
PROT SERPL-MCNC: 7.6 G/DL (ref 6–8.5)
RBC # BLD AUTO: 4.3 10*6/MM3 (ref 3.77–5.28)
SODIUM SERPL-SCNC: 141 MMOL/L (ref 136–145)
TSH SERPL DL<=0.005 MIU/L-ACNC: 4.45 UIU/ML (ref 0.27–4.2)
WBC # BLD AUTO: 7.73 10*3/MM3 (ref 3.4–10.8)

## 2023-09-25 LAB
BACTERIA UR CULT: ABNORMAL
BACTERIA UR CULT: ABNORMAL
OTHER ANTIBIOTIC SUSC ISLT: ABNORMAL

## 2023-09-28 DIAGNOSIS — E11.65 TYPE 2 DIABETES MELLITUS WITH HYPERGLYCEMIA, WITH LONG-TERM CURRENT USE OF INSULIN: ICD-10-CM

## 2023-09-28 DIAGNOSIS — Z79.4 TYPE 2 DIABETES MELLITUS WITH HYPERGLYCEMIA, WITH LONG-TERM CURRENT USE OF INSULIN: ICD-10-CM

## 2023-09-28 RX ORDER — INSULIN LISPRO 100 [IU]/ML
10 INJECTION, SOLUTION INTRAVENOUS; SUBCUTANEOUS 3 TIMES DAILY
Qty: 15 ML | Refills: 5 | Status: SHIPPED | OUTPATIENT
Start: 2023-09-28

## 2023-09-28 RX ORDER — AMLODIPINE BESYLATE 10 MG/1
10 TABLET ORAL DAILY
Qty: 90 TABLET | Refills: 3 | Status: SHIPPED | OUTPATIENT
Start: 2023-09-28

## 2023-09-28 RX ORDER — ONDANSETRON 4 MG/1
4 TABLET, ORALLY DISINTEGRATING ORAL EVERY 8 HOURS PRN
Qty: 30 TABLET | Refills: 2 | Status: SHIPPED | OUTPATIENT
Start: 2023-09-28

## 2023-09-28 RX ORDER — INSULIN DEGLUDEC INJECTION 100 U/ML
40 INJECTION, SOLUTION SUBCUTANEOUS DAILY
Qty: 15 ML | Refills: 6 | Status: SHIPPED | OUTPATIENT
Start: 2023-09-28 | End: 2023-09-28

## 2023-09-28 RX ORDER — INSULIN DEGLUDEC 100 U/ML
INJECTION, SOLUTION SUBCUTANEOUS
Qty: 15 ML | Refills: 0 | Status: SHIPPED | OUTPATIENT
Start: 2023-09-28

## 2023-09-28 RX ORDER — LISINOPRIL 40 MG/1
40 TABLET ORAL DAILY
Qty: 90 TABLET | Refills: 3 | Status: SHIPPED | OUTPATIENT
Start: 2023-09-28

## 2023-09-28 RX ORDER — CARVEDILOL 25 MG/1
25 TABLET ORAL 2 TIMES DAILY
Qty: 180 TABLET | Refills: 3 | Status: SHIPPED | OUTPATIENT
Start: 2023-09-28

## 2023-09-28 RX ORDER — FUROSEMIDE 40 MG/1
40 TABLET ORAL DAILY
Qty: 90 TABLET | Refills: 3 | Status: SHIPPED | OUTPATIENT
Start: 2023-09-28

## 2023-09-28 NOTE — TELEPHONE ENCOUNTER
Caller: [facility] [place] - [place], [place] - [address] [facility] - [phone] [facility] [phone] FX    Relationship: Pharmacy    Best call back number: [phone]     Requested Prescriptions:   Requested Prescriptions     Pending Prescriptions Disp Refills    carvedilol (COREG) 25 MG tablet 180 tablet 3     Sig: Take 1 tablet by mouth 2 (Two) Times a Day.    amLODIPine (NORVASC) 10 MG tablet 90 tablet 3     Sig: Take 1 tablet by mouth Daily.    lisinopril (PRINIVIL,ZESTRIL) 40 MG tablet 90 tablet 3     Sig: Take 1 tablet by mouth Daily.    furosemide (LASIX) 40 MG tablet 90 tablet 3     Sig: Take 1 tablet by mouth Daily.    insulin degludec (Tresiba FlexTouch) 100 UNIT/ML solution pen-injector injection 15 mL 6     Sig: Inject 40 Units under the skin into the appropriate area as directed Daily.    ondansetron ODT (ZOFRAN-ODT) 4 MG disintegrating tablet 30 tablet 2     Sig: Place 1 tablet on the tongue Every 8 (Eight) Hours As Needed for Nausea or Vomiting.    Insulin Lispro, 1 Unit Dial, (HumaLOG KwikPen) 100 UNIT/ML solution pen-injector 15 mL 5     Sig: Inject 10 Units under the skin into the appropriate area as directed 3 (Three) Times a Day.        Pharmacy where request should be sent: [facility] ([place] - [place], [place] - [address] [facility] - [phone] [facility] [phone]     Last office visit with prescribing clinician: 9/18/2023   Last telemedicine visit with prescribing clinician: Visit date not found   Next office visit with prescribing clinician: 12/18/2023     Additional details provided by patient:    Does the patient have less than a 3 day supply:  [] Yes  [x] No    Would you like a call back once the refill request has been completed: [] Yes [x] No    If the office needs to give you a call back, can they leave a voicemail: [] Yes [x] No    Donnie Ren Rep   09/28/23 09:25 EDT

## 2023-10-02 DIAGNOSIS — K21.9 GASTROESOPHAGEAL REFLUX DISEASE WITHOUT ESOPHAGITIS: Primary | ICD-10-CM

## 2023-10-02 RX ORDER — OMEPRAZOLE 20 MG/1
20 CAPSULE, DELAYED RELEASE ORAL DAILY
Qty: 90 CAPSULE | Refills: 3 | Status: SHIPPED | OUTPATIENT
Start: 2023-10-02

## 2023-10-02 NOTE — TELEPHONE ENCOUNTER
Caller: [facility] [place] - [place], [place] - [address] [facility] - [phone] [facility] [phone] FX    Relationship: Pharmacy    Best call back number: [phone]    Requested Prescriptions:   Requested Prescriptions     Pending Prescriptions Disp Refills    omeprazole (priLOSEC) 20 MG capsule 90 capsule 0     Sig: Take 1 capsule by mouth Daily.        Pharmacy where request should be sent: [facility] [place] - [place], [place] - [address] [facility] - [phone] [facility] [phone] FX     Last office visit with prescribing clinician: 9/18/2023   Last telemedicine visit with prescribing clinician: Visit date not found   Next office visit with prescribing clinician: 12/18/2023     Additional details provided by patient: PHARMACY CALLED TO REQUEST MEDICATION FOR PATIENT     Does the patient have less than a 3 day supply:  [x] Yes  [] No    Would you like a call back once the refill request has been completed: [x] Yes [] No    If the office needs to give you a call back, can they leave a voicemail: [x] Yes [] No    Donnie Arreola Rep   10/02/23 09:32 EDT

## 2023-10-30 RX ORDER — ONDANSETRON 4 MG/1
TABLET, ORALLY DISINTEGRATING ORAL
Qty: 30 TABLET | Refills: 1 | Status: SHIPPED | OUTPATIENT
Start: 2023-10-30

## 2023-12-04 ENCOUNTER — OFFICE VISIT (OUTPATIENT)
Dept: INTERNAL MEDICINE | Facility: CLINIC | Age: 68
End: 2023-12-04
Payer: MEDICARE

## 2023-12-04 VITALS
SYSTOLIC BLOOD PRESSURE: 131 MMHG | TEMPERATURE: 97.8 F | HEART RATE: 64 BPM | OXYGEN SATURATION: 96 % | HEIGHT: 63 IN | WEIGHT: 173.8 LBS | DIASTOLIC BLOOD PRESSURE: 78 MMHG | BODY MASS INDEX: 30.79 KG/M2

## 2023-12-04 DIAGNOSIS — E11.65 TYPE 2 DIABETES MELLITUS WITH HYPERGLYCEMIA, WITH LONG-TERM CURRENT USE OF INSULIN: Primary | ICD-10-CM

## 2023-12-04 DIAGNOSIS — Z79.4 TYPE 2 DIABETES MELLITUS WITH HYPERGLYCEMIA, WITH LONG-TERM CURRENT USE OF INSULIN: Primary | ICD-10-CM

## 2023-12-04 DIAGNOSIS — M25.50 PAIN IN JOINT, MULTIPLE SITES: ICD-10-CM

## 2023-12-04 DIAGNOSIS — I10 PRIMARY HYPERTENSION: ICD-10-CM

## 2023-12-04 NOTE — PROGRESS NOTES
"Subjective  Lizeth Bhardwaj is a 68 y.o. female    HPI coming in for follow-up patient with a history of diabetes and hypertension complains of multiple joint pains without deformity no change in activity reasonably compliant with her diet.  She feels tired    The following portions of the patient's history were reviewed and updated as appropriate: allergies, current medications, past family history, past medical history, past social history, past surgical history, and problem list.     Review of Systems   Constitutional: Negative.  Positive for fatigue. Negative for activity change, appetite change and fever.   HENT:  Negative for congestion, ear discharge, ear pain and trouble swallowing.    Eyes:  Negative for photophobia and visual disturbance.   Respiratory:  Negative for cough and shortness of breath.    Cardiovascular:  Negative for chest pain and palpitations.   Gastrointestinal:  Negative for abdominal distention, abdominal pain, constipation, diarrhea, nausea and vomiting.   Endocrine: Negative.    Genitourinary:  Negative for dysuria, hematuria and urgency.   Musculoskeletal:  Positive for arthralgias and gait problem. Negative for back pain, joint swelling and myalgias.   Skin:  Negative for color change and rash.   Allergic/Immunologic: Negative.    Neurological:  Negative for dizziness, weakness, light-headedness and headaches.   Hematological:  Negative for adenopathy. Does not bruise/bleed easily.   Psychiatric/Behavioral:  Negative for agitation, confusion and dysphoric mood. The patient is not nervous/anxious.        Visit Vitals  /78   Pulse 64   Temp 97.8 °F (36.6 °C)   Ht 160 cm (62.99\")   Wt 78.8 kg (173 lb 12.8 oz)   SpO2 96%   BMI 30.80 kg/m²       Objective  Physical Exam  Constitutional:       General: She is not in acute distress.     Appearance: She is well-developed.   HENT:      Nose: Nose normal.   Eyes:      General: No scleral icterus.     Conjunctiva/sclera: Conjunctivae " normal.   Neck:      Thyroid: No thyromegaly.      Trachea: No tracheal deviation.   Cardiovascular:      Rate and Rhythm: Normal rate and regular rhythm.      Heart sounds: No murmur heard.     No friction rub.   Pulmonary:      Effort: No respiratory distress.      Breath sounds: No wheezing or rales.   Abdominal:      General: There is no distension.      Palpations: Abdomen is soft. There is no mass.      Tenderness: There is no abdominal tenderness. There is no guarding.   Musculoskeletal:         General: No deformity. Normal range of motion.   Lymphadenopathy:      Cervical: No cervical adenopathy.   Skin:     General: Skin is warm and dry.      Findings: No erythema or rash.   Neurological:      Mental Status: She is alert and oriented to person, place, and time.      Cranial Nerves: No cranial nerve deficit.      Coordination: Coordination normal.      Deep Tendon Reflexes: Reflexes are normal and symmetric.   Psychiatric:         Behavior: Behavior normal.         Thought Content: Thought content normal.         Judgment: Judgment normal.       Diagnoses and all orders for this visit:    Type 2 diabetes mellitus with hyperglycemia, with long-term current use of insulin continue with the current insulin regimen.  Does not tolerate GLP-1 agonists.  Check labs  -     Comprehensive Metabolic Panel  -     Hemoglobin A1c    Primary hypertension stable with current meds and low-salt diet    Pain in joint, multiple sites .  Exam without significant deformity check rheumatoid factor along with C-reactive protein level check other routine labs.  NSAIDs as needed only  -     CBC & Differential  -     C-reactive Protein  -     Rheumatoid Factor

## 2023-12-05 LAB
ALBUMIN SERPL-MCNC: 4.9 G/DL (ref 3.5–5.2)
ALBUMIN/GLOB SERPL: 1.7 G/DL
ALP SERPL-CCNC: 86 U/L (ref 39–117)
ALT SERPL-CCNC: 22 U/L (ref 1–33)
AST SERPL-CCNC: 25 U/L (ref 1–32)
BASOPHILS # BLD AUTO: 0.06 10*3/MM3 (ref 0–0.2)
BASOPHILS NFR BLD AUTO: 0.5 % (ref 0–1.5)
BILIRUB SERPL-MCNC: 0.4 MG/DL (ref 0–1.2)
BUN SERPL-MCNC: 48 MG/DL (ref 8–23)
BUN/CREAT SERPL: 25.5 (ref 7–25)
CALCIUM SERPL-MCNC: 11 MG/DL (ref 8.6–10.5)
CHLORIDE SERPL-SCNC: 103 MMOL/L (ref 98–107)
CK SERPL-CCNC: 56 U/L (ref 20–180)
CO2 SERPL-SCNC: 25.6 MMOL/L (ref 22–29)
CREAT SERPL-MCNC: 1.88 MG/DL (ref 0.57–1)
CRP SERPL-MCNC: <0.3 MG/DL (ref 0–0.5)
EGFRCR SERPLBLD CKD-EPI 2021: 28.6 ML/MIN/1.73
EOSINOPHIL # BLD AUTO: 0.23 10*3/MM3 (ref 0–0.4)
EOSINOPHIL NFR BLD AUTO: 1.9 % (ref 0.3–6.2)
ERYTHROCYTE [DISTWIDTH] IN BLOOD BY AUTOMATED COUNT: 13.1 % (ref 12.3–15.4)
GLOBULIN SER CALC-MCNC: 2.9 GM/DL
GLUCOSE SERPL-MCNC: 175 MG/DL (ref 65–99)
HBA1C MFR BLD: 10.1 % (ref 4.8–5.6)
HCT VFR BLD AUTO: 40.6 % (ref 34–46.6)
HGB BLD-MCNC: 13.6 G/DL (ref 12–15.9)
IMM GRANULOCYTES # BLD AUTO: 0.03 10*3/MM3 (ref 0–0.05)
IMM GRANULOCYTES NFR BLD AUTO: 0.3 % (ref 0–0.5)
LYMPHOCYTES # BLD AUTO: 1.98 10*3/MM3 (ref 0.7–3.1)
LYMPHOCYTES NFR BLD AUTO: 16.6 % (ref 19.6–45.3)
MCH RBC QN AUTO: 30 PG (ref 26.6–33)
MCHC RBC AUTO-ENTMCNC: 33.5 G/DL (ref 31.5–35.7)
MCV RBC AUTO: 89.6 FL (ref 79–97)
MONOCYTES # BLD AUTO: 0.6 10*3/MM3 (ref 0.1–0.9)
MONOCYTES NFR BLD AUTO: 5 % (ref 5–12)
NEUTROPHILS # BLD AUTO: 9.01 10*3/MM3 (ref 1.7–7)
NEUTROPHILS NFR BLD AUTO: 75.7 % (ref 42.7–76)
NRBC BLD AUTO-RTO: 0 /100 WBC (ref 0–0.2)
PLATELET # BLD AUTO: 254 10*3/MM3 (ref 140–450)
POTASSIUM SERPL-SCNC: 5.7 MMOL/L (ref 3.5–5.2)
PROT SERPL-MCNC: 7.8 G/DL (ref 6–8.5)
RBC # BLD AUTO: 4.53 10*6/MM3 (ref 3.77–5.28)
RHEUMATOID FACT SERPL-ACNC: 11.5 IU/ML
SODIUM SERPL-SCNC: 141 MMOL/L (ref 136–145)
WBC # BLD AUTO: 11.91 10*3/MM3 (ref 3.4–10.8)

## 2023-12-18 RX ORDER — PEN NEEDLE, DIABETIC 31 GX3/16"
NEEDLE, DISPOSABLE MISCELLANEOUS
Qty: 300 EACH | Refills: 3 | Status: SHIPPED | OUTPATIENT
Start: 2023-12-18

## 2024-01-18 RX ORDER — BLOOD SUGAR DIAGNOSTIC
1 STRIP MISCELLANEOUS DAILY
Qty: 100 EACH | Refills: 3 | Status: SHIPPED | OUTPATIENT
Start: 2024-01-18

## 2024-01-18 NOTE — TELEPHONE ENCOUNTER
Caller: Bhardwaj Lizethrob Rosa    Relationship: Self    Best call back number: [phone]     Requested Prescriptions:   Requested Prescriptions     Pending Prescriptions Disp Refills    glucose blood (OneTouch Ultra) test strip 100 each 3     Si each by Other route Daily. Use as instructed        Pharmacy where request should be sent: [facility] [address] [place] [address] [phone] [facility] [phone] FX     Last office visit with prescribing clinician: 2023   Last telemedicine visit with prescribing clinician: Visit date not found   Next office visit with prescribing clinician: Visit date not found     Additional details provided by patient: [facility] [place] - [facility][1] [place] - [address] [facility] [phone] [facility] [phone] FX  WILL NOT FILL THIS    Does the patient have less than a 3 day supply:  [] Yes  [x] No    Would you like a call back once the refill request has been completed: [] Yes [x] No    If the office needs to give you a call back, can they leave a voicemail: [] Yes [x] No    Donnie Dc Rep   24 10:15 EST

## 2024-01-25 RX ORDER — INSULIN DEGLUDEC INJECTION 100 U/ML
INJECTION, SOLUTION SUBCUTANEOUS
Qty: 15 ML | Refills: 5 | Status: SHIPPED | OUTPATIENT
Start: 2024-01-25

## 2024-01-26 RX ORDER — ONDANSETRON 4 MG/1
TABLET, ORALLY DISINTEGRATING ORAL
Qty: 30 TABLET | Refills: 1 | Status: SHIPPED | OUTPATIENT
Start: 2024-01-26

## 2024-01-30 DIAGNOSIS — Z79.4 TYPE 2 DIABETES MELLITUS WITH HYPERGLYCEMIA, WITH LONG-TERM CURRENT USE OF INSULIN: ICD-10-CM

## 2024-01-30 DIAGNOSIS — E11.65 TYPE 2 DIABETES MELLITUS WITH HYPERGLYCEMIA, WITH LONG-TERM CURRENT USE OF INSULIN: ICD-10-CM

## 2024-01-30 RX ORDER — INSULIN LISPRO 100 [IU]/ML
INJECTION, SOLUTION INTRAVENOUS; SUBCUTANEOUS
Qty: 15 ML | Refills: 4 | Status: SHIPPED | OUTPATIENT
Start: 2024-01-30

## 2024-03-01 ENCOUNTER — PRIOR AUTHORIZATION (OUTPATIENT)
Dept: INTERNAL MEDICINE | Facility: CLINIC | Age: 69
End: 2024-03-01
Payer: MEDICARE

## 2024-03-01 NOTE — TELEPHONE ENCOUNTER
TOM JIMENEZ Key: [id number] - Rx #: [id number] help? Call us at [phone]  Outcome  [facility] was not able to process the request because the previous Prior Authorization Request was Denied, please contact the plan at [phone] or fax in request to [phone].

## 2024-03-01 NOTE — TELEPHONE ENCOUNTER
Tried to get a PA for Humalog kwikpen on Cover My Meds, but there was a message saying that it had already been denied.

## 2024-03-08 NOTE — TELEPHONE ENCOUNTER
DELETE AFTER REVIEWING: Send the encounter HIGH priority, If patient has less than a 3 day supply. If the patient will run out of medication over the weekend add that information to the additional details line. Send this encounter to the clinical pool.    [facility]: [facility] [place] - [address] [facility] - [phone] [facility] [phone] FX    Relationship: Pharmacy    Best call back number: [phone]    Requested Prescriptions:   Requested Prescriptions     Pending Prescriptions Disp Refills    NovoLOG FlexPen 100 UNIT/ML solution pen-injector sc pen 15 mL 3        Pharmacy where [facility] should be sent: [facility] - [place] - [address] [facility] - [phone] [facility] [phone]     Last office visit with prescribing clinician: 12/4/2023   Last telemedicine visit with prescribing clinician: Visit date not found   Next office visit with prescribing clinician: Visit date not found     Additional details provided by patient: PHARMACY IS REQUESTING PRESCRIPTION TO BE REPLACING HUMALOG INJECTION DUE TO INSURANCE ADVISING NON FORMULARY WITH PATIENT'S PLAN     Does the patient have less than a 3 day supply:  [] Yes  [x] No    Would you like a call back once the refill request has been completed: [x] Yes [] No    If the office needs to give you a call back, can they leave a voicemail: [x] Yes [] No    Donnie Paz Rep   03/08/24 16:44 EST

## 2024-03-09 RX ORDER — INSULIN ASPART 100 [IU]/ML
10 INJECTION, SOLUTION INTRAVENOUS; SUBCUTANEOUS
Qty: 15 ML | Refills: 3 | Status: SHIPPED | OUTPATIENT
Start: 2024-03-09

## 2024-03-09 NOTE — TELEPHONE ENCOUNTER
Rx Refill Note  Requested Prescriptions     Pending Prescriptions Disp Refills    NovoLOG FlexPen 100 UNIT/ML solution pen-injector sc pen 15 mL 3      Last office visit with prescribing clinician: 12/4/2023   Last telemedicine visit with prescribing clinician: Visit date not found   Next office visit with prescribing clinician: Visit date not found     Georgi Moe MA  03/09/24, 14:17 EST

## 2024-03-11 RX ORDER — INSULIN ASPART 100 [IU]/ML
INJECTION, SOLUTION INTRAVENOUS; SUBCUTANEOUS
Qty: 15 ML | Refills: 0 | OUTPATIENT
Start: 2024-03-11

## 2024-03-11 NOTE — TELEPHONE ENCOUNTER
PLEASE READ BELOW    Caller: Lizeth Bhardwaj Moe    Relationship: Self    Best call back number: [phone]    Requested Prescriptions:   Requested Prescriptions     Pending Prescriptions Disp Refills    NovoLOG FlexPen 100 UNIT/ML solution pen-injector sc pen 15 mL 3     Sig: Inject 10 Units under the skin into the appropriate area as directed 3 (Three) Times a Day With Meals.        Pharmacy where request should be sent: [address] [phone] [facility] [phone]      Last office visit with prescribing clinician: 12/4/2023   Last telemedicine visit with prescribing clinician: Visit date not found   Next office visit with prescribing clinician: Visit date not found     Additional details provided by patient: PATIENT HAS NOT HAD INSULIN IN 3 DAYS. SHE NEEDS A PRESCRIPTION SENT HER HER LOCAL PHARMACY AS HER MAIL PHARMACY HAS NOT DELIVERED YET. PLEASE ADVISE     Does the patient have less than a 3 day supply:  [x] Yes  [] No    Would you like a call back once the refill request has been completed: [x] Yes [] No    If the office needs to give you a call back, can they leave a voicemail: [x] Yes [] No    Donnie Arreola Rep   03/11/24 09:55 EDT

## 2024-03-12 RX ORDER — INSULIN ASPART 100 [IU]/ML
10 INJECTION, SOLUTION INTRAVENOUS; SUBCUTANEOUS
Qty: 15 ML | Refills: 3 | OUTPATIENT
Start: 2024-03-12

## 2024-03-12 RX ORDER — INSULIN ASPART 100 [IU]/ML
INJECTION, SOLUTION INTRAVENOUS; SUBCUTANEOUS
Qty: 15 ML | Refills: 0 | Status: SHIPPED | OUTPATIENT
Start: 2024-03-12

## 2024-03-12 NOTE — TELEPHONE ENCOUNTER
Patient states that the pharmacy messed up her prescriptions and did not send her any regular insulin.  She is asking it to be sent in to [facility] due to her being completely out.  Please advise.  Phone number verified.

## 2024-05-29 RX ORDER — ONDANSETRON 4 MG/1
4 TABLET, ORALLY DISINTEGRATING ORAL EVERY 8 HOURS PRN
Qty: 30 TABLET | Refills: 11 | Status: SHIPPED | OUTPATIENT
Start: 2024-05-29

## 2024-05-31 RX ORDER — INSULIN ASPART 100 [IU]/ML
INJECTION, SOLUTION INTRAVENOUS; SUBCUTANEOUS
Qty: 15 ML | Refills: 5 | Status: SHIPPED | OUTPATIENT
Start: 2024-05-31

## 2024-05-31 NOTE — TELEPHONE ENCOUNTER
Rx Refill Note  Requested Prescriptions     Pending Prescriptions Disp Refills    NovoLOG FlexPen 100 UNIT/ML solution pen-injector sc pen [Pharmacy Med Name: NovoLOG FlexPen 100 UNIT/ML Subcutaneous Solution Pen-injector] 15 mL 0     Sig: INJECT 10 UNITS SUBCUTANEOUSLY THREE TIMES DAILY WITH MEALS      Last office visit with prescribing clinician: 12/4/2023   Last telemedicine visit with prescribing clinician: Visit date not found   Next office visit with prescribing clinician: Visit date not found                         Would you like a call back once the refill request has been completed: [] Yes [] No    If the office needs to give you a call back, can they leave a voicemail: [] Yes [] No    Marcella Wiseman MA  05/31/24, 15:23 EDT

## 2024-05-31 NOTE — TELEPHONE ENCOUNTER
Caller: Lizeth Bhardwaj    Relationship: Self    Best call back number: [phone]     Who are you requesting to speak with (clinical staff, provider,  specific staff member): CLINICAL      What was the call regarding: CHECKING THE STATUS ON THE REFILLS

## 2024-05-31 NOTE — TELEPHONE ENCOUNTER
Rx Refill Note  Requested Prescriptions     Pending Prescriptions Disp Refills    NovoLOG FlexPen 100 UNIT/ML solution pen-injector sc pen [Pharmacy Med Name: NovoLOG FlexPen 100 UNIT/ML Subcutaneous Solution Pen-injector] 15 mL 0     Sig: INJECT 10 UNITS SUBCUTANEOUSLY THREE TIMES DAILY WITH MEALS      Last office visit with prescribing clinician: 12/4/2023   Last telemedicine visit with prescribing clinician: Visit date not found   Next office visit with prescribing clinician: Visit date not found                         Would you like a call back once the refill request has been completed: [] Yes [] No    If the office needs to give you a call back, can they leave a voicemail: [] Yes [] No    Marcella Wiseman MA  05/31/24, 14:37 EDT

## 2024-06-04 ENCOUNTER — TELEPHONE (OUTPATIENT)
Dept: INTERNAL MEDICINE | Facility: CLINIC | Age: 69
End: 2024-06-04
Payer: MEDICARE

## 2024-06-04 DIAGNOSIS — E11.65 TYPE 2 DIABETES MELLITUS WITH HYPERGLYCEMIA, WITH LONG-TERM CURRENT USE OF INSULIN: Primary | ICD-10-CM

## 2024-06-04 DIAGNOSIS — Z79.4 TYPE 2 DIABETES MELLITUS WITH HYPERGLYCEMIA, WITH LONG-TERM CURRENT USE OF INSULIN: Primary | ICD-10-CM

## 2024-06-04 NOTE — TELEPHONE ENCOUNTER
Caller: Lizeth Bhardwaj    Relationship: Self    Best call back number: [phone]    Equipment requested: DEXCOM G7 READER     Reason for the request: HER PHONE WON'T WORK WITH HER MONITORING DEVICE    Prescribing Provider: DR. NASCIMENTO    Additional information or concerns: PHARMACY NEEDS PRESCRIPTION FOR THIS.         [facility] [address] [place] [address] [phone] [facility] [phone] FX

## 2024-06-05 RX ORDER — ACYCLOVIR 400 MG/1
1 TABLET ORAL DAILY
Qty: 1 EACH | Refills: 0 | Status: SHIPPED | OUTPATIENT
Start: 2024-06-05

## 2024-06-05 NOTE — TELEPHONE ENCOUNTER
[facility] states they pulled Dex[facility] G[facility] sensor from [facility] but could not the . Rx for the  sent to [facility].     Patient notified.

## 2024-07-23 RX ORDER — ATORVASTATIN CALCIUM 20 MG/1
TABLET, FILM COATED ORAL
Qty: 90 TABLET | Refills: 3 | Status: SHIPPED | OUTPATIENT
Start: 2024-07-23

## 2024-07-23 NOTE — TELEPHONE ENCOUNTER
Rx Refill Note  Requested Prescriptions     Pending Prescriptions Disp Refills    atorvastatin (LIPITOR) 20 MG tablet [Pharmacy Med Name: atorvastatin 20 mg tablet] 90 tablet 3     Sig: TAKE ONE TABLET BY MOUTH DAILY AT 5 PM      Last office visit with prescribing clinician: 12/4/2023   Last telemedicine visit with prescribing clinician: Visit date not found   Next office visit with prescribing clinician: Visit date not found                         Would you like a call back once the refill request has been completed: [] Yes [] No    If the office needs to give you a call back, can they leave a voicemail: [] Yes [] No    Marcella Wiseman MA  07/23/24, 11:12 EDT

## 2024-08-16 ENCOUNTER — OFFICE VISIT (OUTPATIENT)
Dept: INTERNAL MEDICINE | Facility: CLINIC | Age: 69
End: 2024-08-16
Payer: MEDICARE

## 2024-08-16 VITALS
HEIGHT: 64 IN | SYSTOLIC BLOOD PRESSURE: 152 MMHG | OXYGEN SATURATION: 97 % | WEIGHT: 177 LBS | DIASTOLIC BLOOD PRESSURE: 73 MMHG | TEMPERATURE: 97.7 F | HEART RATE: 64 BPM | BODY MASS INDEX: 30.22 KG/M2 | RESPIRATION RATE: 16 BRPM

## 2024-08-16 DIAGNOSIS — I10 PRIMARY HYPERTENSION: ICD-10-CM

## 2024-08-16 DIAGNOSIS — N18.32 STAGE 3B CHRONIC KIDNEY DISEASE: ICD-10-CM

## 2024-08-16 DIAGNOSIS — Z79.4 TYPE 2 DIABETES MELLITUS WITH HYPERGLYCEMIA, WITH LONG-TERM CURRENT USE OF INSULIN: Primary | ICD-10-CM

## 2024-08-16 DIAGNOSIS — E11.65 TYPE 2 DIABETES MELLITUS WITH HYPERGLYCEMIA, WITH LONG-TERM CURRENT USE OF INSULIN: Primary | ICD-10-CM

## 2024-08-16 LAB
EXPIRATION DATE: ABNORMAL
HBA1C MFR BLD: 9 % (ref 4.5–5.7)
Lab: ABNORMAL

## 2024-08-16 RX ORDER — DAPAGLIFLOZIN 10 MG/1
TABLET, FILM COATED ORAL
COMMUNITY

## 2024-08-16 RX ORDER — LEVOTHYROXINE SODIUM 0.05 MG/1
TABLET ORAL
Qty: 90 TABLET | Refills: 3 | Status: SHIPPED | OUTPATIENT
Start: 2024-08-16

## 2024-08-16 RX ORDER — INSULIN LISPRO 100 [IU]/ML
15 INJECTION, SOLUTION INTRAVENOUS; SUBCUTANEOUS
Qty: 45 ML | Refills: 3 | Status: SHIPPED | OUTPATIENT
Start: 2024-08-16

## 2024-08-16 RX ORDER — INSULIN DEGLUDEC 100 U/ML
60 INJECTION, SOLUTION SUBCUTANEOUS
Qty: 60 ML | Refills: 3 | Status: SHIPPED | OUTPATIENT
Start: 2024-08-16

## 2024-08-16 NOTE — PROGRESS NOTES
Subjective   The ABCs of the Annual Wellness Visit  Medicare Wellness Visit      Lizeth Bhardwaj is a 68 y.o. patient who presents for a Medicare Wellness Visit.    The following portions of the patient's history were reviewed and   updated as appropriate: allergies, current medications, past family history, past medical history, past social history, past surgical history, and problem list.    Compared to one year ago, the patient's physical   health is the same.  Compared to one year ago, the patient's mental   health is the same.    Recent Hospitalizations:  She was not admitted to the hospital during the last year.     Current Medical Providers:  Patient Care Team:  Severo Means MD as PCP - General (Internal Medicine)  Tres Estrella MD, RICK as Consulting Physician (Nephrology)  Jessica Celestin MD as Consulting Physician (Ophthalmology)    Outpatient Medications Prior to Visit   Medication Sig Dispense Refill    Alcohol Swabs (B-D SINGLE USE SWABS REGULAR) pads Test daily for DM E11.65 100 each 3    amLODIPine (NORVASC) 10 MG tablet Take 1 tablet by mouth Daily. 90 tablet 3    aspirin 81 MG EC tablet Take 1 tablet by mouth Daily.      atorvastatin (LIPITOR) 20 MG tablet TAKE ONE TABLET BY MOUTH DAILY AT 5 PM 90 tablet 3    BIOTIN PO Take  by mouth.      Blood Glucose Calibration (True Metrix Level 1) Low solution 1 each by In Vitro route Every 30 (Thirty) Days. 1 each 3    Blood Glucose Monitoring Suppl (True Metrix Meter) w/Device kit 1 each Daily. 1 kit 0    carvedilol (COREG) 25 MG tablet Take 1 tablet by mouth 2 (Two) Times a Day. 180 tablet 3    Continuous Glucose  (Dexcom G7 ) device Use 1 each Daily. 1 each 0    Farxiga 10 MG tablet TAKE ONE TABLET BY MOUTH DAILY AT 9 AM IN THE MORNING      furosemide (LASIX) 40 MG tablet Take 1 tablet by mouth Daily. (Patient taking differently: Take 0.5 tablets by mouth Daily.) 90 tablet 3    glucose blood (OneTouch Ultra) test strip 1 each  by Other route Daily. Use as instructed 100 each 3    glucose monitor monitoring kit Test glucose daily for DM. E11.65 1 each 0    Insulin Lispro, 1 Unit Dial, (HUMALOG) 100 UNIT/ML solution pen-injector INJECT 10 UNITS SUBCUTANEOUSLY TO APPROPRIATE AREA THREE TIMES DAILY AS DIRECTED (BULK) 15 mL 4    Insulin Pen Needle (Droplet Pen Needles) 31G X 5 MM misc Use daily with insulin 300 each 3    levothyroxine (SYNTHROID, LEVOTHROID) 50 MCG tablet TAKE ONE TABLET BY MOUTH DAILY AT 8 AM 90 tablet 3    lisinopril (PRINIVIL,ZESTRIL) 40 MG tablet Take 1 tablet by mouth Daily. 90 tablet 3    NovoLOG FlexPen 100 UNIT/ML solution pen-injector sc pen INJECT 10 UNITS SUBCUTANEOUSLY THREE TIMES DAILY WITH MEALS 15 mL 5    omeprazole (priLOSEC) 20 MG capsule Take 1 capsule by mouth Daily. 90 capsule 3    ondansetron ODT (ZOFRAN-ODT) 4 MG disintegrating tablet DISSOLVE ONE TABLET BY MOUTH EVERY 8 HOURS AS NEEDED FOR NAUSEA AND VOMITING 30 tablet 11    Tresiba FlexTouch 100 UNIT/ML solution pen-injector injection INJECT 40 UNITS SUBCUTANEOUSLY INTO THE APPROPRIATE AREA AS DIRECTED EVERY DAY (BULK) 15 mL 5    TRUEplus Lancets 33G misc Test daily for DM E11.65 100 each 3    vitamin D3 125 MCG (5000 UT) capsule capsule Take 2,000 Units by mouth Daily.       No facility-administered medications prior to visit.     No opioid medication identified on active medication list. I have reviewed chart for other potential  high risk medication/s and harmful drug interactions in the elderly.      Aspirin is on active medication list. Aspirin use is indicated based on review of current medical condition/s. Pros and cons of this therapy have been discussed today. Benefits of this medication outweigh potential harm.  Patient has been encouraged to continue taking this medication.  .      Patient Active Problem List   Diagnosis    Type 2 diabetes mellitus with hyperglycemia    Primary hypertension    Nuclear sclerotic cataract of left eye  "    Advance Care Planning Advance Directive is not on file.  ACP discussion was held with the patient during this visit. Patient does not have an advance directive, declines further assistance.            Objective   Vitals:    24 1600   BP: 152/73   Pulse: 64   Resp: 16   Temp: 97.7 °F (36.5 °C)   SpO2: 97%   Weight: 80.3 kg (177 lb)   Height: 161.3 cm (63.5\")   PainSc: 0-No pain       Estimated body mass index is 30.86 kg/m² as calculated from the following:    Height as of this encounter: 161.3 cm (63.5\").    Weight as of this encounter: 80.3 kg (177 lb).    BMI is >= 30 and <35. (Class 1 Obesity). The following options were offered after discussion;: exercise counseling/recommendations and nutrition counseling/recommendations       Does the patient have evidence of cognitive impairment? No  Lab Results   Component Value Date    HGBA1C 9.0 (A) 2024                                                                                                Health  Risk Assessment    Smoking Status:  Social History     Tobacco Use   Smoking Status Former    Current packs/day: 0.00    Average packs/day: 0.5 packs/day for 3.0 years (1.5 ttl pk-yrs)    Types: Cigarettes    Start date:     Quit date:     Years since quittin.6   Smokeless Tobacco Never     Alcohol Consumption:  Social History     Substance and Sexual Activity   Alcohol Use Not Currently       Fall Risk Screen  STEADI Fall Risk Assessment was completed, and patient is at LOW risk for falls.Assessment completed on:2024    Depression Screenin/16/2024     4:06 PM   PHQ-2/PHQ-9 Depression Screening   Little Interest or Pleasure in Doing Things 0-->not at all   Feeling Down, Depressed or Hopeless 0-->not at all   PHQ-9: Brief Depression Severity Measure Score 0     Health Habits and Functional and Cognitive Screenin/16/2024     4:04 PM   Functional & Cognitive Status   Do you have difficulty preparing food and eating? No   Do " you have difficulty bathing yourself, getting dressed or grooming yourself? No   Do you have difficulty using the toilet? No   Do you have difficulty moving around from place to place? No   Do you have trouble with steps or getting out of a bed or a chair? No   Current Diet Well Balanced Diet   Dental Exam Up to date   Eye Exam Up to date   Exercise (times per week) 7 times per week   Current Exercises Include Other   Do you need help using the phone?  No   Are you deaf or do you have serious difficulty hearing?  No   Do you need help to go to places out of walking distance? No   Do you need help shopping? No   Do you need help preparing meals?  No   Do you need help with housework?  No   Do you need help with laundry? No   Do you need help taking your medications? No   Do you need help managing money? No   Do you ever drive or ride in a car without wearing a seat belt? No   Have you felt unusual stress, anger or loneliness in the last month? No   Who do you live with? Alone   If you need help, do you have trouble finding someone available to you? No   Have you been bothered in the last four weeks by sexual problems? No   Do you have difficulty concentrating, remembering or making decisions? No           Age-appropriate Screening Schedule:  Refer to the list below for future screening recommendations based on patient's age, sex and/or medical conditions. Orders for these recommended tests are listed in the plan section. The patient has been provided with a written plan.    Health Maintenance List  Health Maintenance   Topic Date Due    DXA SCAN  Never done    ZOSTER VACCINE (2 of 3) 01/15/2016    DIABETIC FOOT EXAM  Never done    PAP SMEAR  Never done    Pneumococcal Vaccine 65+ (3 of 3 - PPSV23 or PCV20) 06/18/2022    COLORECTAL CANCER SCREENING  11/18/2023    ANNUAL WELLNESS VISIT  03/06/2024    BMI FOLLOWUP  03/06/2024    MAMMOGRAM  03/10/2024    TDAP/TD VACCINES (2 - Td or Tdap) 08/05/2024    INFLUENZA VACCINE   08/01/2024    COVID-19 Vaccine (3 - 2023-24 season) 11/05/2024 (Originally 9/1/2023)    HEMOGLOBIN A1C  02/16/2025    DIABETIC EYE EXAM  06/11/2025    URINE MICROALBUMIN  06/13/2025    HEPATITIS C SCREENING  Completed                                                                                                                                                CMS Preventative Services Quick Reference  Risk Factors Identified During Encounter  Chronic Pain: Home exercise plan outlined.  Polypharmacy: Medication List reviewed and Medications are appropriate for patient  Urinary Incontinence: Kegel exercises discussed    The above risks/problems have been discussed with the patient.  Pertinent information has been shared with the patient in the After Visit Summary.  An After Visit Summary and PPPS were made available to the patient.    Follow Up:   Next Medicare Wellness visit to be scheduled in 1 year.         Additional E&M Note during same encounter follows:  Patient has additional, significant, and separately identifiable condition(s)/problem(s) that require work above and beyond the Medicare Wellness Visit     Chief Complaint  Medicare Wellness-subsequent    Subjective    HPI  Lizeth is also being seen today for additional medical problem/s.  Poor blood sugar control.  Has had a history of mild does not joint pains but those have now resolved is exercising more regularly however her blood sugar readings at home show poor control thank you    Review of Systems   Constitutional:  Positive for fatigue. Negative for activity change, appetite change and fever.   HENT:  Negative for congestion, ear discharge, ear pain and trouble swallowing.    Eyes:  Negative for photophobia and visual disturbance.   Respiratory:  Negative for cough and shortness of breath.    Cardiovascular:  Negative for chest pain and palpitations.   Gastrointestinal:  Negative for abdominal distention, constipation, diarrhea, nausea and vomiting.  "  Genitourinary:  Negative for dysuria, hematuria and urgency.   Musculoskeletal:  Positive for arthralgias. Negative for back pain, joint swelling and myalgias.   Skin:  Negative for color change and rash.   Neurological:  Negative for dizziness, weakness, light-headedness and confusion.   Hematological:  Negative for adenopathy. Does not bruise/bleed easily.   Psychiatric/Behavioral:  Positive for sleep disturbance. Negative for agitation and dysphoric mood. The patient is not nervous/anxious.           Objective   Vital Signs:  /73   Pulse 64   Temp 97.7 °F (36.5 °C)   Resp 16   Ht 161.3 cm (63.5\")   Wt 80.3 kg (177 lb)   SpO2 97%   BMI 30.86 kg/m²   Physical Exam  Constitutional:       General: She is not in acute distress.     Appearance: She is well-developed.   HENT:      Nose: Nose normal.   Eyes:      General: No scleral icterus.     Conjunctiva/sclera: Conjunctivae normal.   Neck:      Thyroid: No thyromegaly.      Trachea: No tracheal deviation.   Cardiovascular:      Rate and Rhythm: Normal rate and regular rhythm.      Heart sounds: No murmur heard.     No friction rub.   Pulmonary:      Effort: No respiratory distress.      Breath sounds: No wheezing or rales.   Abdominal:      General: There is no distension.      Palpations: Abdomen is soft. There is no mass.      Tenderness: There is no abdominal tenderness. There is no guarding.   Musculoskeletal:         General: Deformity present. Normal range of motion.   Lymphadenopathy:      Cervical: No cervical adenopathy.   Skin:     General: Skin is warm and dry.      Findings: No erythema or rash.   Neurological:      Mental Status: She is alert and oriented to person, place, and time.      Cranial Nerves: No cranial nerve deficit.      Coordination: Coordination normal.      Deep Tendon Reflexes: Reflexes are normal and symmetric.   Psychiatric:         Behavior: Behavior normal.         Thought Content: Thought content normal.         " Judgment: Judgment normal.                   Assessment and Plan   Assessment plan #1 type 2 diabetes with hyperglycemia and suboptimal control A1c today at 9.0.  Increase short acting insulin from 10 units to 15 units 3 times daily with meals.  Basal in insulin has been increased from 40 units initially to 50 units and may go up to 60 units nightly if needed follow-up again in 4 to 6 weeks.  Discussed walking regimen and exercise program  Hypertension stable on amlodipine discussed low-sodium diet  CKD 3B with a GFR around 30.  Avoid NSAIDs encouraged to drink enough fluids needs good BP control and blood sugar control      Orders Placed This Encounter   Procedures    POC Glycosylated Hemoglobin (Hb A1C)     Order Specific Question:   Release to patient     Answer:   Routine Release [[id number]]             Follow Up   No follow-ups on file.  Patient was given instructions and counseling regarding her condition or for health maintenance advice. Please see specific information pulled into the AVS if appropriate.

## 2024-08-19 ENCOUNTER — PRIOR AUTHORIZATION (OUTPATIENT)
Dept: INTERNAL MEDICINE | Facility: CLINIC | Age: 69
End: 2024-08-19
Payer: MEDICARE

## 2024-08-21 ENCOUNTER — TELEPHONE (OUTPATIENT)
Dept: INTERNAL MEDICINE | Facility: CLINIC | Age: 69
End: 2024-08-21
Payer: MEDICARE

## 2024-08-21 NOTE — TELEPHONE ENCOUNTER
Caller: Lizeth Bhardwaj    Relationship: Self    Best call back number: [phone]     What medication are you requesting: SAXENDA INJECTION    What are your current symptoms: PATIENT IS HAVING NAUSEA AND VOMITING FROM THE OZEMPIC INJECTION AND WOULD LIKE TO TRY THIS.    If a prescription is needed, what is your preferred pharmacy and phone number: [facility] [address] [place] [address] - [address] [place] [address] [phone] [facility] [phone]

## 2024-08-26 ENCOUNTER — TELEPHONE (OUTPATIENT)
Dept: INTERNAL MEDICINE | Facility: CLINIC | Age: 69
End: 2024-08-26
Payer: MEDICARE

## 2024-08-26 ENCOUNTER — PRIOR AUTHORIZATION (OUTPATIENT)
Dept: INTERNAL MEDICINE | Facility: CLINIC | Age: 69
End: 2024-08-26
Payer: MEDICARE

## 2024-08-26 DIAGNOSIS — K21.9 GASTROESOPHAGEAL REFLUX DISEASE WITHOUT ESOPHAGITIS: ICD-10-CM

## 2024-08-26 NOTE — TELEPHONE ENCOUNTER
Caller: Lizeth Bhardwaj    Relationship: Self    Best call back number: [phone]    What was the call regarding: PATIENT CALLED TO ASK IF THE SAXSENDA WAS APPROVED.

## 2024-08-26 NOTE — TELEPHONE ENCOUNTER
Lizeth Bhardwaj (Rivera: [id number])  Rx #: [id number]  Saxenda 18MG/3ML pen-injectors      Information regarding your request  The medication you have requested is not covered by Medicare Part D Law. If you believe the medication is being used for a medically accepted or compendia supported indication approved by CMS, please contact your patient's plan.

## 2024-08-27 ENCOUNTER — TELEPHONE (OUTPATIENT)
Dept: INTERNAL MEDICINE | Facility: CLINIC | Age: 69
End: 2024-08-27
Payer: MEDICARE

## 2024-08-27 NOTE — TELEPHONE ENCOUNTER
Called and spoke with patient advising saxenda was denied. She will reach out to insurance and see what needs to be done.

## 2024-08-27 NOTE — TELEPHONE ENCOUNTER
Caller: Lizeth Bhardwaj    Relationship: Self    Best call back number: [phone]    Which medication are you concerned about: SAXENDA    Who prescribed you this medication: DR NASCIMENTO    What are your concerns: PATIENT STATES INSURANCE WILL NOT COVER THIS MEDICATION HOWEVER THEY WILL COVER VICTOZA WITH A PRIOR AUTHORIZATION SINCE THIS MEDICATION HAS LESS SIDE EFFECTS. PLEASE ADVISE

## 2024-08-28 RX ORDER — LIRAGLUTIDE 6 MG/ML
0.6 INJECTION SUBCUTANEOUS DAILY
Qty: 3 ML | Refills: 5 | Status: SHIPPED | OUTPATIENT
Start: 2024-08-28

## 2024-08-28 RX ORDER — INSULIN ASPART 100 [IU]/ML
INJECTION, SOLUTION INTRAVENOUS; SUBCUTANEOUS
Qty: 15 ML | Refills: 3 | Status: SHIPPED | OUTPATIENT
Start: 2024-08-28

## 2024-08-30 ENCOUNTER — PRIOR AUTHORIZATION (OUTPATIENT)
Dept: INTERNAL MEDICINE | Facility: CLINIC | Age: 69
End: 2024-08-30
Payer: MEDICARE

## 2024-08-30 NOTE — TELEPHONE ENCOUNTER
Caller: Lizeth Bhardwaj    Relationship to patient: Self    Best call back number: [phone]     Patient is needing: Liraglutide (Victoza) 18 MG/3ML solution pen-injector injection     PATIENT CALLED PHARMACY AND WAS TOLD THIS STILL NEEDED A PA.

## 2024-08-30 NOTE — TELEPHONE ENCOUNTER
Outcome  Approved today by [employer] NCPDP 2017  Your request has been approved  Authorization Expiration Date: 12/31/2024

## 2024-08-30 NOTE — TELEPHONE ENCOUNTER
TOM JIMENEZ (Key: [id number])  PA Case ID #: [id number]  Need Help? Call us at [phone]  Status  sent iconSent to Plan today  Drug  Victoza 18MG/3ML pen-injectors  [employer] Electronic PA Form (2017 NCPDP)

## 2024-09-05 DIAGNOSIS — E11.65 TYPE 2 DIABETES MELLITUS WITH HYPERGLYCEMIA, WITH LONG-TERM CURRENT USE OF INSULIN: ICD-10-CM

## 2024-09-05 DIAGNOSIS — K21.9 GASTROESOPHAGEAL REFLUX DISEASE WITHOUT ESOPHAGITIS: ICD-10-CM

## 2024-09-05 DIAGNOSIS — Z79.4 TYPE 2 DIABETES MELLITUS WITH HYPERGLYCEMIA, WITH LONG-TERM CURRENT USE OF INSULIN: ICD-10-CM

## 2024-09-05 NOTE — TELEPHONE ENCOUNTER
Caller: [facility] - [facility] [place] - [address] - [phone] [facility] [phone]     Relationship: Pharmacy      Requested Prescriptions:   Requested Prescriptions     Pending Prescriptions Disp Refills    vitamin D3 125 MCG (5000 UT) capsule capsule 30 capsule      Sig: Take 1 capsule by mouth Daily.    TRUEplus Lancets 33G misc 100 each 3     Sig: Test daily for DM E11.65    ondansetron ODT (ZOFRAN-ODT) 4 MG disintegrating tablet 30 tablet 11     Sig: Take 1 tablet by mouth Every 8 (Eight) Hours As Needed for Nausea or Vomiting.    omeprazole (priLOSEC) 20 MG capsule 90 capsule 1    NovoLOG FlexPen 100 UNIT/ML solution pen-injector sc pen 15 mL 3    lisinopril (PRINIVIL,ZESTRIL) 40 MG tablet 90 tablet 3     Sig: Take 1 tablet by mouth Daily.    Liraglutide (Victoza) 18 MG/3ML solution pen-injector injection 3 mL 5     Sig: Inject 0.6 mg under the skin into the appropriate area as directed Daily.    levothyroxine (SYNTHROID, LEVOTHROID) 50 MCG tablet 90 tablet 3    Insulin Pen Needle (Droplet Pen Needles) 31G X 5 MM misc 300 each 3     Sig: Use daily with insulin    Insulin Lispro, 1 Unit Dial, (HUMALOG) 100 UNIT/ML solution pen-injector 45 mL 3     Sig: Inject 15 Units under the skin into the appropriate area as directed 3 (Three) Times a Day Before Meals.    insulin degludec (Tresiba FlexTouch) 100 UNIT/ML solution pen-injector injection 60 mL 3     Sig: Inject 60 Units under the skin into the appropriate area as directed every night at bedtime.    glucose monitor monitoring kit 1 each 0     Sig: Test glucose daily for DM. E11.65    glucose blood (OneTouch Ultra) test strip 100 each 3     Si each by Other route Daily. Use as instructed    furosemide (LASIX) 40 MG tablet 90 tablet 3     Sig: Take 1 tablet by mouth Daily.    Farxiga 10 MG tablet 30 tablet     Continuous Glucose  (Dexcom G7 ) device 1 each 0     Sig: Use 1 each Daily.    carvedilol (COREG) 25 MG tablet 180  tablet 3     Sig: Take 1 tablet by mouth 2 (Two) Times a Day.    Blood Glucose Monitoring Suppl (True Metrix Meter) w/Device kit 1 kit 0     Sig: Use 1 each Daily.    Blood Glucose Calibration (True Metrix Level 1) Low solution 1 each 3     Si each by In Vitro route Every 30 (Thirty) Days.    atorvastatin (LIPITOR) 20 MG tablet 90 tablet 3    aspirin 81 MG EC tablet       Sig: Take 1 tablet by mouth Daily.    amLODIPine (NORVASC) 10 MG tablet 90 tablet 3     Sig: Take 1 tablet by mouth Daily.    Alcohol Swabs (B-D SINGLE USE SWABS REGULAR) pads 100 each 3     Sig: Test daily for DM E11.65        Pharmacy where request should be sent: [facility] [place] [facility] [place] [address] - [phone] [facility] [phone] FX     Last office visit with prescribing clinician: 2024   Last telemedicine visit with prescribing clinician: Visit date not found   Next office visit with prescribing clinician: 2024         Does the patient have less than a 3 day supply:  [] Yes  [x] No    Would you like a call back once the refill request has been completed: [] Yes [x] No    If the office needs to give you a call back, can they leave a voicemail: [] Yes [x] No    Donnie Coombs   24 14:13 EDT

## 2024-09-06 RX ORDER — LEVOTHYROXINE SODIUM 50 UG/1
50 TABLET ORAL
Qty: 90 TABLET | Refills: 3 | Status: SHIPPED | OUTPATIENT
Start: 2024-09-06

## 2024-09-06 RX ORDER — AMLODIPINE BESYLATE 10 MG/1
10 TABLET ORAL DAILY
Qty: 90 TABLET | Refills: 3 | Status: SHIPPED | OUTPATIENT
Start: 2024-09-06

## 2024-09-06 RX ORDER — ISOPROPYL ALCOHOL 0.75 G/1
SWAB TOPICAL
Qty: 100 EACH | Refills: 3 | Status: SHIPPED | OUTPATIENT
Start: 2024-09-06

## 2024-09-06 RX ORDER — INSULIN ASPART 100 [IU]/ML
10 INJECTION, SOLUTION INTRAVENOUS; SUBCUTANEOUS
Qty: 15 ML | Refills: 3 | Status: SHIPPED | OUTPATIENT
Start: 2024-09-06

## 2024-09-06 RX ORDER — ACYCLOVIR 400 MG/1
1 TABLET ORAL DAILY
Qty: 1 EACH | Refills: 0 | Status: SHIPPED | OUTPATIENT
Start: 2024-09-06

## 2024-09-06 RX ORDER — CARVEDILOL 25 MG/1
25 TABLET ORAL 2 TIMES DAILY
Qty: 180 TABLET | Refills: 3 | Status: SHIPPED | OUTPATIENT
Start: 2024-09-06

## 2024-09-06 RX ORDER — GLUCOSAM/CHON-MSM1/C/MANG/BOSW 500-416.6
TABLET ORAL
Qty: 100 EACH | Refills: 3 | Status: SHIPPED | OUTPATIENT
Start: 2024-09-06

## 2024-09-06 RX ORDER — PEN NEEDLE, DIABETIC 31 GX3/16"
NEEDLE, DISPOSABLE MISCELLANEOUS
Qty: 300 EACH | Refills: 3 | Status: SHIPPED | OUTPATIENT
Start: 2024-09-06

## 2024-09-06 RX ORDER — FUROSEMIDE 40 MG
20 TABLET ORAL DAILY
Qty: 90 TABLET | Refills: 3 | Status: SHIPPED | OUTPATIENT
Start: 2024-09-06

## 2024-09-06 RX ORDER — ASPIRIN 81 MG/1
81 TABLET ORAL DAILY
Qty: 90 TABLET | Refills: 3 | Status: SHIPPED | OUTPATIENT
Start: 2024-09-06

## 2024-09-06 RX ORDER — BLOOD SUGAR DIAGNOSTIC
1 STRIP MISCELLANEOUS DAILY
Qty: 100 EACH | Refills: 3 | Status: SHIPPED | OUTPATIENT
Start: 2024-09-06

## 2024-09-06 RX ORDER — DAPAGLIFLOZIN 10 MG/1
TABLET, FILM COATED ORAL
Qty: 30 TABLET | OUTPATIENT
Start: 2024-09-06

## 2024-09-06 RX ORDER — BLOOD-GLUCOSE CONTROL, LOW
1 EACH MISCELLANEOUS
Qty: 1 EACH | Refills: 3 | Status: SHIPPED | OUTPATIENT
Start: 2024-09-06

## 2024-09-06 RX ORDER — ONDANSETRON 4 MG/1
4 TABLET, ORALLY DISINTEGRATING ORAL EVERY 8 HOURS PRN
Qty: 30 TABLET | Refills: 11 | Status: SHIPPED | OUTPATIENT
Start: 2024-09-06

## 2024-09-06 RX ORDER — BLOOD-GLUCOSE METER
KIT MISCELLANEOUS
Qty: 1 EACH | Refills: 0 | Status: SHIPPED | OUTPATIENT
Start: 2024-09-06

## 2024-09-06 RX ORDER — INSULIN LISPRO 100 [IU]/ML
15 INJECTION, SOLUTION INTRAVENOUS; SUBCUTANEOUS
Qty: 45 ML | Refills: 3 | Status: SHIPPED | OUTPATIENT
Start: 2024-09-06

## 2024-09-06 RX ORDER — INSULIN DEGLUDEC 100 U/ML
60 INJECTION, SOLUTION SUBCUTANEOUS
Qty: 60 ML | Refills: 3 | Status: SHIPPED | OUTPATIENT
Start: 2024-09-06

## 2024-09-06 RX ORDER — LISINOPRIL 40 MG/1
40 TABLET ORAL DAILY
Qty: 90 TABLET | Refills: 3 | Status: SHIPPED | OUTPATIENT
Start: 2024-09-06

## 2024-09-06 RX ORDER — LIRAGLUTIDE 6 MG/ML
0.6 INJECTION SUBCUTANEOUS DAILY
Qty: 3 ML | Refills: 5 | Status: SHIPPED | OUTPATIENT
Start: 2024-09-06

## 2024-09-06 RX ORDER — ATORVASTATIN CALCIUM 20 MG/1
20 TABLET, FILM COATED ORAL NIGHTLY
Qty: 90 TABLET | Refills: 3 | Status: SHIPPED | OUTPATIENT
Start: 2024-09-06

## 2024-11-05 ENCOUNTER — OFFICE VISIT (OUTPATIENT)
Dept: PULMONOLOGY | Facility: CLINIC | Age: 69
End: 2024-11-05
Payer: MEDICARE

## 2024-11-05 VITALS
RESPIRATION RATE: 18 BRPM | SYSTOLIC BLOOD PRESSURE: 132 MMHG | OXYGEN SATURATION: 98 % | DIASTOLIC BLOOD PRESSURE: 76 MMHG | BODY MASS INDEX: 29.88 KG/M2 | WEIGHT: 175 LBS | HEART RATE: 72 BPM | HEIGHT: 64 IN

## 2024-11-05 DIAGNOSIS — G47.00 INSOMNIA, UNSPECIFIED TYPE: ICD-10-CM

## 2024-11-05 DIAGNOSIS — G47.19 EXCESSIVE DAYTIME SLEEPINESS: Primary | ICD-10-CM

## 2024-11-05 DIAGNOSIS — R06.83 SNORING: ICD-10-CM

## 2024-11-05 PROCEDURE — 99204 OFFICE O/P NEW MOD 45 MIN: CPT | Performed by: INTERNAL MEDICINE

## 2024-11-05 PROCEDURE — 3075F SYST BP GE 130 - 139MM HG: CPT | Performed by: INTERNAL MEDICINE

## 2024-11-05 PROCEDURE — 3078F DIAST BP <80 MM HG: CPT | Performed by: INTERNAL MEDICINE

## 2024-11-05 NOTE — PROGRESS NOTES
CONSULT NOTE    Requested by:   Laura Brenner MD Patel, Ashish M, MD      Chief Complaint   Patient presents with    Sleeping Problem    Consult       Subjective:  Lizeth Bhardwaj is a 69 y.o. female.   Patient came in today for evaluation of possible sleep apnea. Patient says that for the past few years she snores    she has been sleeping in a recliner for a long time, since she snores if she sleeps in the bed.     she does dream.    she feels that she doesn't get restful night sleep and her quality has diminished considerably. she does feel sleepy watching TV and reading a book.      she is not complaining of occasional headaches.     The patient takes Tylenol PM.    she felt that she had sleep apnea, but was never diagnosed with it.     her Epsworth Sleepiness score was 10 /24.     Patient suffers from hypertension & diabetes.      Patient is complaining of symptoms of insomnia.  Patient says that she has noticed issues going to & maintaining sleep.  she goes to bed around 8 PM and leaves the bed in the morning around 4 AM.  The patient says that she feels that she only sleeps for 3-4 hour per night.      The following portions of the patient's history were reviewed and updated as appropriate: allergies, current medications, past family history, past medical history, past social history, and past surgical history.    Review of Systems   HENT:  Negative for sinus pressure, sneezing and sore throat.    Respiratory:  Negative for cough, chest tightness, shortness of breath and wheezing.    Psychiatric/Behavioral:  Positive for sleep disturbance.    All other systems reviewed and are negative.      Past Medical History:   Diagnosis Date    Cataract     Chronic kidney disease (CKD), stage I     Diabetes     type 1    Disease of thyroid gland     Fatty liver     GERD (gastroesophageal reflux disease)     Hiatal hernia     Hypertension        Social History     Tobacco Use    Smoking status: Former      "Current packs/day: 0.00     Average packs/day: 0.5 packs/day for 3.0 years (1.5 ttl pk-yrs)     Types: Cigarettes     Start date:      Quit date:      Years since quittin.8    Smokeless tobacco: Never   Substance Use Topics    Alcohol use: Not Currently         Objective:  Visit Vitals  /76   Pulse 72   Resp 18   Ht 161.3 cm (63.5\") Comment: pt reported   Wt 79.4 kg (175 lb)   SpO2 98%   BMI 30.51 kg/m²       BMI Readings from Last 8 Encounters:   24 30.51 kg/m²   24 30.86 kg/m²   23 30.80 kg/m²   23 30.72 kg/m²   23 30.11 kg/m²   23 30.47 kg/m²   23 29.64 kg/m²   23 29.52 kg/m²       Physical Exam  Vitals reviewed.   Constitutional:       Appearance: She is well-developed.   HENT:      Head: Atraumatic.      Mouth/Throat:      Comments: Oropharynx was crowded.  Neurological:      Mental Status: She is alert and oriented to person, place, and time.           Assessment/Plan:  Diagnoses and all orders for this visit:    1. Excessive daytime sleepiness (Primary)  -     Polysomnography 4 or More Parameters; Future    2. Snoring  -     Polysomnography 4 or More Parameters; Future    3. Insomnia, unspecified type  -     Polysomnography 4 or More Parameters; Future        Return in about 4 months (around 3/5/2025) for SleepONLY/Sri.    DISCUSSION(if any):  Laboratory workup also showed   Lab Results   Component Value Date    HGB 13.6 2023    HGB 12.7 2023    HGB 11.0 (L) 10/09/2021   ,   Lab Results   Component Value Date    HCT 40.6 2023    HCT 37.2 2023    HCT 33.5 (L) 10/09/2021       Lab Results   Component Value Date    EOSABS 0.23 2023    EOSABS 0.28 2023    EOSABS 0.33 10/08/2021    & Laboratory workup also showed   Lab Results   Component Value Date    CO2 25.6 2023    CO2 25.9 2023    CO2 26.3 2023   ,   Lab Results   Component Value Date    HGBA1C 9.0 (A) 2024    HGBA1C 10.10 (H) " 12/04/2023    HGBA1C 8.90 (H) 09/18/2023   ,   Lab Results   Component Value Date    TSH 4.450 (H) 09/18/2023    TSH 5.110 (H) 06/08/2023    TSH 1.740 03/06/2023     ===========================  ===========================    Sleep questionnaire was reviewed with the patient    The pathophysiology of sleep apnea was discussed, with her.     We will encourage her to schedule the sleep study soon.     The patient will definitely need to be considered for an in lab study due to insomnia among other reasons.  It should be noted that a home sleep study is likely to underestimate the true AHI and is unable to assess sleep stages and abnormal sleep behaviors etc. The patient has understood.    The patient is agreeable to try CPAP/BiPAP, if needed.     Sleep hygiene measures were discussed with the patient in great detail.    The patient was told that she will need to follow a strict time to go to bed as well as a fixed time to get out of bed.    Multiple measures were discussed including sleep restriction and she was strongly encouraged to follow other recommendations including avoiding naps, watching TV in the bed etc.    Due to significant ongoing symptoms, we will consider Restoril OR doxepin.     Side effects of prescribed medication were discussed.    Lupillo was reviewed.    Patient was given reading material regarding sleep apnea.        Dictated utilizing Dragon dictation.    This document was electronically signed by Joyce Martines MD on 11/05/24 at 14:44 EST

## 2024-11-11 ENCOUNTER — PATIENT ROUNDING (BHMG ONLY) (OUTPATIENT)
Dept: PULMONOLOGY | Facility: CLINIC | Age: 69
End: 2024-11-11
Payer: MEDICARE

## 2024-11-25 ENCOUNTER — TELEPHONE (OUTPATIENT)
Dept: PULMONOLOGY | Facility: CLINIC | Age: 69
End: 2024-11-25

## 2024-11-25 NOTE — TELEPHONE ENCOUNTER
Hub staff attempted to follow warm transfer process and was unsuccessful     Caller: Lizeth Bhardwaj    Relationship to patient: Self    Best call back number: [phone]     Patient is needing: PT NEEDS TO SCHEDULE SLEEP STUDY

## 2024-12-06 RX ORDER — INSULIN DEGLUDEC 100 U/ML
INJECTION, SOLUTION SUBCUTANEOUS
Qty: 30 ML | Refills: 0 | Status: SHIPPED | OUTPATIENT
Start: 2024-12-06

## 2024-12-13 RX ORDER — BLOOD SUGAR DIAGNOSTIC
STRIP MISCELLANEOUS
Qty: 100 EACH | Refills: 0 | Status: SHIPPED | OUTPATIENT
Start: 2024-12-13

## 2025-01-15 NOTE — TELEPHONE ENCOUNTER
Please schedule follow up with Candy at 830 on 2/12 note that will see in infusion   Spoke with patient this medication was not sent to pharmacy was sent as no print. Changed to normal

## 2025-01-30 ENCOUNTER — TELEPHONE (OUTPATIENT)
Dept: INTERNAL MEDICINE | Facility: CLINIC | Age: 70
End: 2025-01-30
Payer: MEDICARE

## 2025-01-30 RX ORDER — INSULIN ASPART INJECTION 100 [IU]/ML
40 INJECTION, SOLUTION SUBCUTANEOUS DAILY
Qty: 35 ML | Refills: 3 | Status: CANCELLED | OUTPATIENT
Start: 2025-01-30

## 2025-01-30 NOTE — TELEPHONE ENCOUNTER
Caller: Liezth Bhardwaj    Relationship: Self    Best call back number: [phone]    What medication are you requesting:     INSURANCE WILL PAY FOR  FAISP FLEX  UNIT PER ML    If a prescription is needed, what is your preferred pharmacy and phone number:      [facility] [address] [place] [address] [phone] [facility] [phone]      Additional notes:    PATIENT IS COMPLETELY OUT OF MEDICATION

## 2025-01-30 NOTE — TELEPHONE ENCOUNTER
Rx pended to you for approval if appropriate. Not sure how much different the dosing is from the Triseba

## 2025-01-30 NOTE — TELEPHONE ENCOUNTER
Caller: Lizeth Bhardwaj    Relationship: Self    Best call back number: [phone]     Requested Prescriptions:   INSULIN        Pharmacy where request should be sent: [facility] [address] [place] [address] [phone] [facility] [phone] FX     Last office visit with prescribing clinician: 8/16/2024   Last telemedicine visit with prescribing clinician: Visit date not found   Next office visit with prescribing clinician: Visit date not found     Additional details provided by patient: PATIENT CAN NOT REMEMBER THE NAME OF HER INSULIN. PATIENT IS OUT OF INSULIN. THE INSURANCE WILL NOT PAY FOR THE LAST INSULIN THE PROVIDER CALLED IN.  PLEASE CALL AND ADVISE     Does the patient have less than a 3 day supply:  [x] Yes      Would you like a call back once the refill request has been completed: [x] Yes     If the office needs to give you a call back, can they leave a voicemail: [x] Yes     Laquita Winter MA   01/30/25 09:54 EST

## 2025-01-30 NOTE — TELEPHONE ENCOUNTER
Left message for patient to give a call back to discuss medication and what would be covered under her insurance.

## 2025-01-31 DIAGNOSIS — E11.65 TYPE 2 DIABETES MELLITUS WITH HYPERGLYCEMIA, WITH LONG-TERM CURRENT USE OF INSULIN: Primary | ICD-10-CM

## 2025-01-31 DIAGNOSIS — Z79.4 TYPE 2 DIABETES MELLITUS WITH HYPERGLYCEMIA, WITH LONG-TERM CURRENT USE OF INSULIN: Primary | ICD-10-CM

## 2025-01-31 RX ORDER — INSULIN LISPRO 100 U/ML
15 INJECTION, SOLUTION SUBCUTANEOUS
Qty: 12 ML | Refills: 3 | Status: SHIPPED | OUTPATIENT
Start: 2025-01-31

## 2025-01-31 NOTE — TELEPHONE ENCOUNTER
Called and spoke with patient and advised of the alternative being sent in she voiced appreciation and understanding.

## 2025-02-14 ENCOUNTER — HOSPITAL ENCOUNTER (OUTPATIENT)
Dept: SLEEP MEDICINE | Facility: HOSPITAL | Age: 70
End: 2025-02-14
Payer: MEDICARE

## 2025-02-14 DIAGNOSIS — R06.83 SNORING: ICD-10-CM

## 2025-02-14 DIAGNOSIS — G47.19 EXCESSIVE DAYTIME SLEEPINESS: ICD-10-CM

## 2025-02-14 DIAGNOSIS — G47.00 INSOMNIA, UNSPECIFIED TYPE: ICD-10-CM

## 2025-02-14 PROCEDURE — 95810 POLYSOM 6/> YRS 4/> PARAM: CPT

## 2025-02-17 ENCOUNTER — TELEPHONE (OUTPATIENT)
Dept: PULMONOLOGY | Facility: CLINIC | Age: 70
End: 2025-02-17
Payer: MEDICARE

## 2025-03-06 ENCOUNTER — OFFICE VISIT (OUTPATIENT)
Dept: PULMONOLOGY | Facility: CLINIC | Age: 70
End: 2025-03-06
Payer: MEDICARE

## 2025-03-06 VITALS
HEART RATE: 68 BPM | SYSTOLIC BLOOD PRESSURE: 124 MMHG | BODY MASS INDEX: 29.98 KG/M2 | OXYGEN SATURATION: 98 % | RESPIRATION RATE: 18 BRPM | DIASTOLIC BLOOD PRESSURE: 68 MMHG | WEIGHT: 175.6 LBS | HEIGHT: 64 IN

## 2025-03-06 DIAGNOSIS — G47.19 EXCESSIVE DAYTIME SLEEPINESS: ICD-10-CM

## 2025-03-06 DIAGNOSIS — G47.00 INSOMNIA, UNSPECIFIED TYPE: Primary | ICD-10-CM

## 2025-03-06 DIAGNOSIS — Z72.821 POOR SLEEP HYGIENE: ICD-10-CM

## 2025-03-06 RX ORDER — DOXEPIN HYDROCHLORIDE 25 MG/1
25 CAPSULE ORAL NIGHTLY
Qty: 30 CAPSULE | Refills: 3 | Status: SHIPPED | OUTPATIENT
Start: 2025-03-06

## 2025-03-06 NOTE — PROGRESS NOTES
"Chief Complaint   Patient presents with    Sleeping Problem    Follow-up         Subjective   Lizeth Bhardwaj is a 69 y.o. female.     The patient comes in today for follow-up of obstructive sleep apnea.    I reviewed her sleep study and discussed the results with her today.  The sleep study revealed no sleep apnea with an apnea hypopnea index of 4.8 per hour.  Her apnea-hypopnea index was worse in REM sleep at 20 per hour.  Poor sleep efficiency noted.    She told me that someone she knows had suggested drinking a few ounces of beer at night and this would help her sleep.  She states she has tried this and did seem to fall asleep easily and sleep more through the night.    She states she has a large hiatal hernia and cannot lay down to sleep. She has slept in the recliner for years now.     She states she sits in her recliner around 8 pm but falls asleep much later. She does not have a bedtime routine.     She keeps says she used to sleep like a baby, but cannot tell me when she started having sleep issues. She states she has kidney issues and maybe it started then. She f/w Dr. Estrella.    She states she has a fatty liver.     She does not drink caffeine.     She states she was told to try drinking a little beer prior to bedtime.         The following portions of the patient's history were reviewed and updated as appropriate: allergies, current medications, past family history, past medical history, past social history, and past surgical history.      Review of Systems   HENT:  Positive for sore throat. Negative for sinus pressure and sneezing.    Respiratory:  Negative for cough, chest tightness, shortness of breath and wheezing.    Psychiatric/Behavioral:  Positive for sleep disturbance.        Objective   Visit Vitals  /68   Pulse 68   Resp 18   Ht 161.3 cm (63.5\")   Wt 79.7 kg (175 lb 9.6 oz)   SpO2 98%   BMI 30.61 kg/m²         Physical Exam  Vitals reviewed.   Constitutional:       Appearance: She is " well-developed.   HENT:      Head: Atraumatic.      Mouth/Throat:      Mouth: Mucous membranes are moist.   Eyes:      Extraocular Movements: Extraocular movements intact.   Cardiovascular:      Rate and Rhythm: Normal rate and regular rhythm.   Pulmonary:      Effort: Pulmonary effort is normal. No respiratory distress.      Breath sounds: Normal breath sounds. No wheezing.   Abdominal:      Comments: Obese abdomen.    Skin:     General: Skin is warm.   Neurological:      Mental Status: She is alert and oriented to person, place, and time.           Assessment & Plan   Diagnoses and all orders for this visit:    1. Insomnia, unspecified type (Primary)    2. Poor sleep hygiene    3. Excessive daytime sleepiness    Other orders  -     doxepin (SINEquan) 25 MG capsule; Take 1 capsule by mouth Every Night.  Dispense: 30 capsule; Refill: 3           Return in about 5 months (around 8/6/2025) for Recheck, For Dr. Martines, Sleep ONLY.    DISCUSSION (if any):  I have recommended the patient try to develop a bedtime routine.  This may be somewhat difficult because she does sleep in a recliner which is most likely in a living area versus a bedroom.  I have suggested she aim for a bedtime of 930 to 10 PM instead of 8 PM.  I have suggested she aim for a wake time of 6 AM because she likes to wake up early.  I have advised her not to nap during the day.    I have discussed with her that drinking any kind of alcohol even in small amounts is not a good idea especially given her comorbidities of kidney failure and fatty liver per her report.    Also discussed that alcohol may initially help individuals fall asleep but it is not usually a restful rejuvenating sleep.    I have prescribed doxepin.  I have asked her to discuss the medication with nephrology prior to starting it.    I have told her to discuss some of her fatigue with her PCP.  I reviewed the most recent labs that are available to me with the patient.    Labs from  12/12/2024 done with nephrology  o ago    Glucose  70 - 99 mg/dL 205 High    BUN  8 - 27 mg/dL 28 High    Creatinine  0.57 - 1.00 mg/dL 1.59 High    eGFR CKD-EPI CR 2021  >59 mL/min/1.73 35 Low    BUN/Creatinine Ratio  12 - 28 18   Sodium  134 - 144 mmol/L 142   Potassium  3.5 - 5.2 mmol/L 4.5   Chloride  96 - 106 mmol/L 107 High    Bicarbonate (CO2)  20 - 29 mmol/L 21   Calcium  8.7 - 10.3 mg/dL 9.9   Phosphorus  3.0 - 4.3 mg/dL 3.8   Albumin  3.9 - 4.9 g/dL 4.6     Hemoglobin A1C  4.8 - 5.6 % 10 High       Latest Reference Range & Units Most Recent   TSH Baseline 0.270 - 4.200 uIU/mL 4.450 (H)  9/18/23 15:07      Latest Reference Range & Units Most Recent   Alkaline Phosphatase 39 - 117 U/L 86  12/4/23 13:45   Total Protein 6.0 - 8.5 g/dL 7.8  12/4/23 13:45   Albumin 3.5 - 5.2 g/dL 4.9  12/4/23 13:45   Globulin gm/dL 2.5  10/8/21 05:39   A/G Ratio g/dL 1.7  12/4/23 13:45   AST (SGOT) 1 - 32 U/L 25  12/4/23 13:45   ALT (SGPT) 1 - 33 U/L 22  12/4/23 13:45         A total of 22 minutes was spent reviewing all the information available including reviewing previous history from PCP/sleep specialist, as applicable, reviewing available sleep studies/compliance data.  This also included discussion regarding importance of sleep hygiene and possible lifestyle modifications, if applicable/necessary, that may impact sleep.  Time was also spent documenting information in electronic health record and placing orders, as appropriate and applicable.     Dictated utilizing Dragon dictation.    This document was electronically signed by NIDIA Manzanares March 7, 2025  13:12 EST

## 2025-03-19 RX ORDER — INSULIN DEGLUDEC 100 U/ML
INJECTION, SOLUTION SUBCUTANEOUS
Qty: 30 ML | Refills: 0 | Status: SHIPPED | OUTPATIENT
Start: 2025-03-19

## 2025-05-29 ENCOUNTER — TELEPHONE (OUTPATIENT)
Dept: INTERNAL MEDICINE | Facility: CLINIC | Age: 70
End: 2025-05-29
Payer: MEDICARE

## 2025-05-29 NOTE — TELEPHONE ENCOUNTER
Caller: Lizeth Bhardwaj    Relationship: Self    Best call back number: [phone]     What medication are you requesting: ADMELOG 15 UNITS BEFORE EACH MEAL    What are your current symptoms:     How long have you been experiencing symptoms:    Have you had these symptoms before:    [] Yes  [] No    Have you been treated for these symptoms before:   [] Yes  [] No    If a prescription is needed, what is your preferred pharmacy and phone number: [facility] [address] [place] [address] [phone] [facility] [phone]      Additional notes:

## 2025-05-29 NOTE — TELEPHONE ENCOUNTER
Caller: Lizeth Bhardwaj    Relationship: Self    Best call back number: [phone]    Requested Prescriptions:   Requested Prescriptions     Pending Prescriptions Disp Refills    Tresiba FlexTouch 100 UNIT/ML solution pen-injector injection 30 mL 0        Pharmacy where request should be sent: [facility] [address] [place] [address] [phone] [facility] [phone] FX     Last office visit with prescribing clinician: 8/16/2024   Last telemedicine visit with prescribing clinician: Visit date not found   Next office visit with prescribing clinician: Visit date not found     Additional details provided by patient: PATIENT HAS LESS THAN 3 DAYS LEFT    Does the patient have less than a 3 day supply:  [x] Yes  [] No    Would you like a call back once the refill request has been completed: [] Yes [x] No    If the office needs to give you a call back, can they leave a voicemail: [] Yes [x] No    Jena Yepez   05/29/25 12:14 EDT

## 2025-05-30 DIAGNOSIS — Z79.4 TYPE 2 DIABETES MELLITUS WITH HYPERGLYCEMIA, WITH LONG-TERM CURRENT USE OF INSULIN: ICD-10-CM

## 2025-05-30 DIAGNOSIS — E11.65 TYPE 2 DIABETES MELLITUS WITH HYPERGLYCEMIA, WITH LONG-TERM CURRENT USE OF INSULIN: ICD-10-CM

## 2025-05-30 RX ORDER — INSULIN LISPRO 100 U/ML
INJECTION, SOLUTION SUBCUTANEOUS
Qty: 12 ML | Refills: 0 | OUTPATIENT
Start: 2025-05-30

## 2025-05-30 RX ORDER — INSULIN DEGLUDEC 100 U/ML
40 INJECTION, SOLUTION SUBCUTANEOUS DAILY
Qty: 30 ML | Refills: 3 | Status: SHIPPED | OUTPATIENT
Start: 2025-05-30

## 2025-05-31 ENCOUNTER — TELEPHONE (OUTPATIENT)
Dept: INTERNAL MEDICINE | Facility: CLINIC | Age: 70
End: 2025-05-31
Payer: MEDICARE

## 2025-05-31 NOTE — TELEPHONE ENCOUNTER
Spoke with pt. She said she has Tresiba, but is almost out of Admelog. Chart shows Sri Goport d/c'ed the Admelog in March, but pt said she didn't know that. Can we refill the Admelog and get her an appt. soon? She was supposed to be seen at the end of Sept. I didn't see any appts. available for days. Thank you.

## 2025-06-02 NOTE — TELEPHONE ENCOUNTER
Spoke with patient and she is coming in for an appt with Angelica tomorrow so we can get her back on track.

## 2025-06-03 ENCOUNTER — OFFICE VISIT (OUTPATIENT)
Dept: INTERNAL MEDICINE | Facility: CLINIC | Age: 70
End: 2025-06-03
Payer: MEDICARE

## 2025-06-03 VITALS
OXYGEN SATURATION: 97 % | HEIGHT: 64 IN | RESPIRATION RATE: 16 BRPM | BODY MASS INDEX: 29.88 KG/M2 | HEART RATE: 64 BPM | SYSTOLIC BLOOD PRESSURE: 122 MMHG | DIASTOLIC BLOOD PRESSURE: 74 MMHG | WEIGHT: 175 LBS | TEMPERATURE: 97.7 F

## 2025-06-03 DIAGNOSIS — E11.65 TYPE 2 DIABETES MELLITUS WITH HYPERGLYCEMIA, WITH LONG-TERM CURRENT USE OF INSULIN: Primary | ICD-10-CM

## 2025-06-03 DIAGNOSIS — Z12.11 ENCOUNTER FOR SCREENING FOR MALIGNANT NEOPLASM OF COLON: ICD-10-CM

## 2025-06-03 DIAGNOSIS — Z12.31 SCREENING MAMMOGRAM, ENCOUNTER FOR: ICD-10-CM

## 2025-06-03 DIAGNOSIS — E11.65 TYPE 2 DIABETES MELLITUS WITH HYPERGLYCEMIA, WITH LONG-TERM CURRENT USE OF INSULIN: ICD-10-CM

## 2025-06-03 DIAGNOSIS — Z79.4 TYPE 2 DIABETES MELLITUS WITH HYPERGLYCEMIA, WITH LONG-TERM CURRENT USE OF INSULIN: ICD-10-CM

## 2025-06-03 DIAGNOSIS — Z79.4 TYPE 2 DIABETES MELLITUS WITH HYPERGLYCEMIA, WITH LONG-TERM CURRENT USE OF INSULIN: Primary | ICD-10-CM

## 2025-06-03 DIAGNOSIS — E03.9 ACQUIRED HYPOTHYROIDISM: ICD-10-CM

## 2025-06-03 DIAGNOSIS — E78.5 DYSLIPIDEMIA: ICD-10-CM

## 2025-06-03 PROCEDURE — 99214 OFFICE O/P EST MOD 30 MIN: CPT | Performed by: PHYSICIAN ASSISTANT

## 2025-06-03 PROCEDURE — 1126F AMNT PAIN NOTED NONE PRSNT: CPT | Performed by: PHYSICIAN ASSISTANT

## 2025-06-03 PROCEDURE — 1160F RVW MEDS BY RX/DR IN RCRD: CPT | Performed by: PHYSICIAN ASSISTANT

## 2025-06-03 PROCEDURE — 1159F MED LIST DOCD IN RCRD: CPT | Performed by: PHYSICIAN ASSISTANT

## 2025-06-03 PROCEDURE — 3074F SYST BP LT 130 MM HG: CPT | Performed by: PHYSICIAN ASSISTANT

## 2025-06-03 PROCEDURE — 3078F DIAST BP <80 MM HG: CPT | Performed by: PHYSICIAN ASSISTANT

## 2025-06-03 NOTE — PROGRESS NOTES
Office Visit      Patient Name: Lizeth Bhardwaj  : 1955   MRN: [id number]     Chief Complaint:    Chief Complaint   Patient presents with    Diabetes     Follow up        History of Present Illness: Lizeth Bhardwaj is a 69 y.o. female who is here today for follow-up of diabetes.  Last A1c was 10.0 on 2024 when ordered by her nephrologist. Glucose runs 150-250. Tresiba 40 units daily and Humalog 15 units before each meal. She is also taking Farxiga 10 mg daily which she believes her nephrologist prescribes.     She is taking levothyroxine 50 mcg daily for hypothyroidism. She reports bothersome hair loss.         Subjective      I have reviewed and the following portions of the patient's history were updated as appropriate: past family history, past medical history, past social history, past surgical history and problem list.      Current Outpatient Medications:     Alcohol Swabs (B-D SINGLE USE SWABS REGULAR) pads, Test daily for DM E11.65, Disp: 100 each, Rfl: 3    amLODIPine (NORVASC) 10 MG tablet, Take 1 tablet by mouth Daily., Disp: 90 tablet, Rfl: 3    aspirin 81 MG EC tablet, Take 1 tablet by mouth Daily., Disp: 90 tablet, Rfl: 3    atorvastatin (LIPITOR) 20 MG tablet, Take 1 tablet by mouth Every Night., Disp: 90 tablet, Rfl: 3    Blood Glucose Calibration (True Metrix Level 1) Low solution, 1 each by In Vitro route Every 30 (Thirty) Days., Disp: 1 each, Rfl: 3    Blood Glucose Monitoring Suppl (True Metrix Meter) w/Device kit, Use 1 each Daily., Disp: 1 kit, Rfl: 0    carvedilol (COREG) 25 MG tablet, Take 1 tablet by mouth 2 (Two) Times a Day., Disp: 180 tablet, Rfl: 3    Farxiga 10 MG tablet, TAKE ONE TABLET BY MOUTH DAILY AT 9 AM IN THE MORNING, Disp: , Rfl:     furosemide (LASIX) 40 MG tablet, Take 0.5 tablets by mouth Daily. (Patient taking differently: Take 0.5 tablets by mouth Every Other Day.), Disp: 90 tablet, Rfl: 3    glucose monitor monitoring kit, Test glucose daily for DM.  "E11.65, Disp: 1 each, Rfl: 0    Insulin Lispro, 0.5 Unit Dial, (HUMALOG KWIKPEN JUAN) 100 UNIT/ML solution pen-injector, 15 Units 3 times a day. Before meals, Disp: , Rfl:     Insulin Pen Needle (Droplet Pen Needles) 31G X 5 MM misc, Use daily with insulin, Disp: 300 each, Rfl: 3    levothyroxine (SYNTHROID, LEVOTHROID) 50 MCG tablet, Take 1 tablet by mouth Every Morning., Disp: 90 tablet, Rfl: 3    lisinopril (PRINIVIL,ZESTRIL) 40 MG tablet, Take 1 tablet by mouth Daily., Disp: 90 tablet, Rfl: 3    omeprazole (priLOSEC) 20 MG capsule, Take 1 capsule by mouth Daily., Disp: 90 capsule, Rfl: 3    ondansetron ODT (ZOFRAN-ODT) 4 MG disintegrating tablet, Take 1 tablet by mouth Every 8 (Eight) Hours As Needed for Nausea or Vomiting., Disp: 30 tablet, Rfl: 11    OneTouch Ultra test strip, USE 1 STRIP TO CHECK GLUCOSE ONCE DAILY USE  AS  INSTRUCED, Disp: 100 each, Rfl: 0    Tresiba FlexTouch 100 UNIT/ML solution pen-injector injection, Inject 40 Units under the skin into the appropriate area as directed Daily., Disp: 30 mL, Rfl: 3    TRUEplus Lancets 33G misc, Test daily for DM E11.65, Disp: 100 each, Rfl: 3    vitamin D3 125 MCG (5000 UT) capsule capsule, Take 2,000 Units by mouth Daily., Disp: , Rfl:     No Known Allergies    Objective     Vital Signs:   Vitals:    06/03/25 1043   BP: 122/74   Pulse: 64   Resp: 16   Temp: 97.7 °F (36.5 °C)   TempSrc: Temporal   SpO2: 97%   Weight: 79.4 kg (175 lb)   Height: 162.6 cm (64\")     Body mass index is 30.04 kg/m².    Physical Exam  Vitals and nursing note reviewed.   Constitutional:       General: She is not in acute distress.     Appearance: She is well-developed and overweight. She is not ill-appearing, toxic-appearing or diaphoretic.   HENT:      Head: Normocephalic and atraumatic.      Right Ear: External ear normal.      Left Ear: External ear normal.   Eyes:      General: No scleral icterus.     Extraocular Movements: Extraocular movements intact.      " Conjunctiva/sclera: Conjunctivae normal.      Pupils: Pupils are equal, round, and reactive to light.   Cardiovascular:      Rate and Rhythm: Normal rate and regular rhythm.      Heart sounds: Normal heart sounds. No murmur heard.     No friction rub. No gallop.   Pulmonary:      Effort: Pulmonary effort is normal. No respiratory distress.      Breath sounds: Normal breath sounds. No wheezing, rhonchi or rales.   Chest:      Chest wall: No tenderness.   Abdominal:      General: Bowel sounds are normal.      Palpations: Abdomen is soft.      Tenderness: There is no abdominal tenderness.   Musculoskeletal:         General: No tenderness or deformity. Normal range of motion.      Cervical back: Normal range of motion and neck supple.      Right lower leg: No edema.      Left lower leg: No edema.   Skin:     General: Skin is warm and dry.      Capillary Refill: Capillary refill takes less than 2 seconds.      Coloration: Skin is not jaundiced or pale.      Findings: No rash.   Neurological:      Mental Status: She is alert and oriented to person, place, and time.      Cranial Nerves: No cranial nerve deficit.      Sensory: No sensory deficit.      Motor: No abnormal muscle tone.      Coordination: Coordination normal.      Gait: Gait normal.      Deep Tendon Reflexes: Reflexes normal.   Psychiatric:         Mood and Affect: Mood normal.         Behavior: Behavior normal. Behavior is cooperative.         Thought Content: Thought content normal.         Judgment: Judgment normal.         Common labs          6/13/2024    09:31 8/16/2024    16:25 12/12/2024    12:13   Common Labs   Hemoglobin A1C  9.0  10       Uric Acid 8.9      7          Details          This result is from an external source.                 Assessment / Plan      Assessment/Plan:   Diagnoses and all orders for this visit:    1. Type 2 diabetes mellitus with hyperglycemia, with long-term current use of insulin (Primary)  -     Hemoglobin A1c  -     CBC  (No Diff)  -     Comprehensive Metabolic Panel  -     Lipid Panel    2. Acquired hypothyroidism  -     TSH Rfx On Abnormal To Free T4  -     CBC (No Diff)    3. Screening mammogram, encounter for  -     Mammo Screening Digital Tomosynthesis Bilateral With CAD; Future    4. Dyslipidemia  -     Lipid Panel    5. Encounter for screening for malignant neoplasm of colon  -     Cologuard - Stool, Per Rectum; Future       Complete labs today. Will notify of results and any necessary change in treatment plan. Diabetic diet discussed and encouraged.           Follow Up:   Return in about 3 months (around 9/3/2025) for medicare wellness.    Patient was given instructions and counseling regarding her condition or for health maintenance advice. Please see specific information pulled into the AVS if appropriate.     Angelica Jett PA-C  [facility]     Please note that portions of this note may have been completed with a voice recognition program.

## 2025-06-04 ENCOUNTER — TELEPHONE (OUTPATIENT)
Dept: INTERNAL MEDICINE | Facility: CLINIC | Age: 70
End: 2025-06-04
Payer: MEDICARE

## 2025-06-04 DIAGNOSIS — Z79.4 TYPE 2 DIABETES MELLITUS WITH HYPERGLYCEMIA, WITH LONG-TERM CURRENT USE OF INSULIN: Primary | ICD-10-CM

## 2025-06-04 DIAGNOSIS — E11.65 TYPE 2 DIABETES MELLITUS WITH HYPERGLYCEMIA, WITH LONG-TERM CURRENT USE OF INSULIN: Primary | ICD-10-CM

## 2025-06-04 LAB
ALBUMIN SERPL-MCNC: 4.3 G/DL (ref 3.5–5.2)
ALBUMIN/GLOB SERPL: 1.5 G/DL
ALP SERPL-CCNC: 95 U/L (ref 39–117)
ALT SERPL-CCNC: 16 U/L (ref 1–33)
AST SERPL-CCNC: 22 U/L (ref 1–32)
BILIRUB SERPL-MCNC: 0.4 MG/DL (ref 0–1.2)
BUN SERPL-MCNC: 38 MG/DL (ref 8–23)
BUN/CREAT SERPL: 25.3 (ref 7–25)
CALCIUM SERPL-MCNC: 10 MG/DL (ref 8.6–10.5)
CHLORIDE SERPL-SCNC: 107 MMOL/L (ref 98–107)
CHOLEST SERPL-MCNC: 175 MG/DL (ref 0–200)
CO2 SERPL-SCNC: 23.9 MMOL/L (ref 22–29)
CREAT SERPL-MCNC: 1.5 MG/DL (ref 0.57–1)
EGFRCR SERPLBLD CKD-EPI 2021: 37.3 ML/MIN/1.73
ERYTHROCYTE [DISTWIDTH] IN BLOOD BY AUTOMATED COUNT: 12.9 % (ref 12.3–15.4)
GLOBULIN SER CALC-MCNC: 2.9 GM/DL
GLUCOSE SERPL-MCNC: 97 MG/DL (ref 65–99)
HBA1C MFR BLD: 9.8 % (ref 4.8–5.6)
HCT VFR BLD AUTO: 39.3 % (ref 34–46.6)
HDLC SERPL-MCNC: 35 MG/DL (ref 40–60)
HGB BLD-MCNC: 13.3 G/DL (ref 12–15.9)
LDLC SERPL CALC-MCNC: 91 MG/DL (ref 0–100)
MCH RBC QN AUTO: 30.9 PG (ref 26.6–33)
MCHC RBC AUTO-ENTMCNC: 33.8 G/DL (ref 31.5–35.7)
MCV RBC AUTO: 91.2 FL (ref 79–97)
PLATELET # BLD AUTO: 218 10*3/MM3 (ref 140–450)
POTASSIUM SERPL-SCNC: 5.2 MMOL/L (ref 3.5–5.2)
PROT SERPL-MCNC: 7.2 G/DL (ref 6–8.5)
RBC # BLD AUTO: 4.31 10*6/MM3 (ref 3.77–5.28)
SODIUM SERPL-SCNC: 142 MMOL/L (ref 136–145)
TRIGL SERPL-MCNC: 294 MG/DL (ref 0–150)
TSH SERPL DL<=0.005 MIU/L-ACNC: 2.87 UIU/ML (ref 0.27–4.2)
VLDLC SERPL CALC-MCNC: 49 MG/DL (ref 5–40)
WBC # BLD AUTO: 7.72 10*3/MM3 (ref 3.4–10.8)

## 2025-06-04 RX ORDER — INSULIN LISPRO 100 U/ML
INJECTION, SOLUTION SUBCUTANEOUS
Qty: 12 ML | Refills: 0 | OUTPATIENT
Start: 2025-06-04

## 2025-06-04 RX ORDER — INSULIN LISPRO 100 U/ML
15 INJECTION, SOLUTION SUBCUTANEOUS 3 TIMES DAILY
Qty: 12 ML | Refills: 1 | Status: SHIPPED | OUTPATIENT
Start: 2025-06-04

## 2025-06-04 NOTE — TELEPHONE ENCOUNTER
"Patient called stating that she is out of her insulin, but she can't remember the name of it. She said that it is not the Tresiba, but thinks it begins with an \"M\". I told her I would contact [facility] pharmacy and find out what it is.   Patient saw Angelica Jett yesterday and Humalog is still listed on her med list, but it is not covered by her insurance.  I called [facility] and spoke with Karely. She said patient is taking Admelog.Which she started on 1/30/25, per chart notes. [facility] last dispensed a weeks supply-1 pen of admelog on 5/24.  We are sending in a new script.  Notified patient via voicemail.  "

## 2025-06-04 NOTE — TELEPHONE ENCOUNTER
Caller: Lizeth Bhardwaj    Relationship: Self    Best call back number: [phone]    Requested Prescriptions:   Requested Prescriptions     Pending Prescriptions Disp Refills    Insulin Lispro, 0.5 Unit Dial, (HUMALOG KWIKPEN JUAN) 100 UNIT/ML solution pen-injector       Sig: 15 Units 3 times a day. Before meals        Pharmacy where request should be sent: [facility] [address] [place] [address] [phone] [facility] [phone] FX     Last office visit with prescribing clinician: 8/16/2024   Last telemedicine visit with prescribing clinician: Visit date not found       Does the patient have less than a 3 day supply:  [x] Yes  [] No  Donnie Coombs Rep   06/04/25 14:30 EDT

## 2025-06-16 RX ORDER — BLOOD SUGAR DIAGNOSTIC
STRIP MISCELLANEOUS
Qty: 100 EACH | Refills: 6 | Status: SHIPPED | OUTPATIENT
Start: 2025-06-16

## 2025-06-16 RX ORDER — BLOOD SUGAR DIAGNOSTIC
STRIP MISCELLANEOUS
Qty: 100 EACH | Refills: 6 | OUTPATIENT
Start: 2025-06-16

## 2025-06-16 NOTE — TELEPHONE ENCOUNTER
Caller: Lizeth Bhardwaj    Relationship: Self    Best call back number: [phone]     Requested Prescriptions:   Requested Prescriptions     Pending Prescriptions Disp Refills    glucose blood (OneTouch Ultra) test strip 100 each 6     Sig: Use as instructed        Pharmacy where request should be sent: [facility] [address] [place] [address] [phone] [facility] [phone] FX     Last office visit with prescribing clinician: 8/16/2024   Last telemedicine visit with prescribing clinician: Visit date not found   Next office visit with prescribing clinician: 9/4/2025

## 2025-06-25 ENCOUNTER — TELEPHONE (OUTPATIENT)
Dept: INTERNAL MEDICINE | Facility: CLINIC | Age: 70
End: 2025-06-25
Payer: MEDICARE

## 2025-06-25 NOTE — TELEPHONE ENCOUNTER
Caller: Lizeth Bhardwaj    Relationship: Self    Best call back number: [phone]     What is the best time to reach you: ANYTIME    Who are you requesting to speak with (clinical staff, provider,  specific staff member): CLINICAL    What was the call regarding: PATIENT WOULD LIKE A CALL BACK REGARDING HER RESULTS OF THE COLORGARD TEST IF IT IS NOT OK. IF TEST IS OK, NO NEED TO CALL. PATIENT DOES NOT HAVE EMAIL AND HAS NO ACCESS TO [employer].

## 2025-07-21 DIAGNOSIS — E11.65 TYPE 2 DIABETES MELLITUS WITH HYPERGLYCEMIA, WITH LONG-TERM CURRENT USE OF INSULIN: ICD-10-CM

## 2025-07-21 DIAGNOSIS — Z79.4 TYPE 2 DIABETES MELLITUS WITH HYPERGLYCEMIA, WITH LONG-TERM CURRENT USE OF INSULIN: ICD-10-CM

## 2025-07-21 RX ORDER — INSULIN LISPRO 100 U/ML
INJECTION, SOLUTION SUBCUTANEOUS
Qty: 12 ML | Refills: 0 | Status: SHIPPED | OUTPATIENT
Start: 2025-07-21

## 2025-08-11 DIAGNOSIS — Z79.4 TYPE 2 DIABETES MELLITUS WITH HYPERGLYCEMIA, WITH LONG-TERM CURRENT USE OF INSULIN: ICD-10-CM

## 2025-08-11 DIAGNOSIS — E11.65 TYPE 2 DIABETES MELLITUS WITH HYPERGLYCEMIA, WITH LONG-TERM CURRENT USE OF INSULIN: ICD-10-CM

## 2025-08-11 RX ORDER — INSULIN LISPRO 100 U/ML
15 INJECTION, SOLUTION SUBCUTANEOUS 3 TIMES DAILY
Qty: 12 ML | Refills: 0 | Status: SHIPPED | OUTPATIENT
Start: 2025-08-11

## 2025-08-19 DIAGNOSIS — K21.9 GASTROESOPHAGEAL REFLUX DISEASE WITHOUT ESOPHAGITIS: ICD-10-CM

## 2025-08-19 RX ORDER — OMEPRAZOLE 20 MG/1
CAPSULE, DELAYED RELEASE ORAL
Qty: 90 CAPSULE | Refills: 3 | Status: SHIPPED | OUTPATIENT
Start: 2025-08-19

## 2025-08-19 RX ORDER — CARVEDILOL 25 MG/1
TABLET ORAL
Qty: 180 TABLET | Refills: 3 | Status: SHIPPED | OUTPATIENT
Start: 2025-08-19

## 2025-08-19 RX ORDER — AMLODIPINE BESYLATE 10 MG/1
TABLET ORAL
Qty: 90 TABLET | Refills: 3 | Status: SHIPPED | OUTPATIENT
Start: 2025-08-19

## 2025-08-19 RX ORDER — LISINOPRIL 40 MG/1
TABLET ORAL
Qty: 90 TABLET | Refills: 3 | Status: SHIPPED | OUTPATIENT
Start: 2025-08-19

## 2025-08-19 RX ORDER — LEVOTHYROXINE SODIUM 50 UG/1
TABLET ORAL
Qty: 90 TABLET | Refills: 3 | Status: SHIPPED | OUTPATIENT
Start: 2025-08-19

## 2025-08-19 RX ORDER — ATORVASTATIN CALCIUM 20 MG/1
TABLET, FILM COATED ORAL
Qty: 90 TABLET | Refills: 3 | Status: SHIPPED | OUTPATIENT
Start: 2025-08-19

## (undated) DEVICE — 2.0MM SHARPTOME™ CRESCENT KNIFE ANGLED, BEVEL UP: Brand: SHARPOINT

## (undated) DEVICE — 0.8MM CLEARPORT PARA KNIFE: Brand: SHARPOINT

## (undated) DEVICE — GLV SURG SENSICARE W/ALOE PF LF 6.5 STRL

## (undated) DEVICE — HP CONCL INTREPID COAX I/A CRV .3MM

## (undated) DEVICE — GLV SURG SENSICARE W/ALOE PF LF 7 STRL

## (undated) DEVICE — CLEAR CORNEAL KNIFE 2.4MM ANG: Brand: SHARPOINT

## (undated) DEVICE — SOL IRRIG H2O 1000ML STRL

## (undated) DEVICE — KT POSTOP CATARACT PT CARE

## (undated) DEVICE — Device